# Patient Record
Sex: FEMALE | Race: WHITE | NOT HISPANIC OR LATINO | Employment: PART TIME | URBAN - METROPOLITAN AREA
[De-identification: names, ages, dates, MRNs, and addresses within clinical notes are randomized per-mention and may not be internally consistent; named-entity substitution may affect disease eponyms.]

---

## 2017-02-18 ENCOUNTER — HOSPITAL ENCOUNTER (EMERGENCY)
Facility: HOSPITAL | Age: 42
Discharge: HOME | End: 2017-02-18
Attending: EMERGENCY MEDICINE
Payer: MEDICAID

## 2017-02-18 VITALS
TEMPERATURE: 98 F | RESPIRATION RATE: 20 BRPM | HEIGHT: 60 IN | OXYGEN SATURATION: 98 % | SYSTOLIC BLOOD PRESSURE: 117 MMHG | WEIGHT: 110 LBS | HEART RATE: 102 BPM | DIASTOLIC BLOOD PRESSURE: 71 MMHG | BODY MASS INDEX: 21.6 KG/M2

## 2017-02-18 DIAGNOSIS — K40.90 UNILATERAL INGUINAL HERNIA WITHOUT OBSTRUCTION OR GANGRENE, RECURRENCE NOT SPECIFIED: ICD-10-CM

## 2017-02-18 DIAGNOSIS — R10.32 LEFT INGUINAL PAIN: ICD-10-CM

## 2017-02-18 DIAGNOSIS — N39.0 URINARY TRACT INFECTION WITHOUT HEMATURIA, SITE UNSPECIFIED: Primary | ICD-10-CM

## 2017-02-18 LAB
ALBUMIN SERPL BCP-MCNC: 3.8 G/DL
ALP SERPL-CCNC: 81 U/L
ALT SERPL W/O P-5'-P-CCNC: 18 U/L
ANION GAP SERPL CALC-SCNC: 13 MMOL/L
AST SERPL-CCNC: 12 U/L
BACTERIA #/AREA URNS HPF: ABNORMAL /HPF
BASOPHILS # BLD AUTO: 0.1 K/UL
BASOPHILS NFR BLD: 1.2 %
BILIRUB SERPL-MCNC: 0.3 MG/DL
BILIRUB UR QL STRIP: NEGATIVE
BUN SERPL-MCNC: 11 MG/DL
CALCIUM SERPL-MCNC: 9.4 MG/DL
CHLORIDE SERPL-SCNC: 101 MMOL/L
CLARITY UR: CLEAR
CO2 SERPL-SCNC: 23 MMOL/L
COLOR UR: YELLOW
CREAT SERPL-MCNC: 0.7 MG/DL
DIFFERENTIAL METHOD: ABNORMAL
EOSINOPHIL # BLD AUTO: 0.2 K/UL
EOSINOPHIL NFR BLD: 1.8 %
ERYTHROCYTE [DISTWIDTH] IN BLOOD BY AUTOMATED COUNT: 13.2 %
EST. GFR  (AFRICAN AMERICAN): >60 ML/MIN/1.73 M^2
EST. GFR  (NON AFRICAN AMERICAN): >60 ML/MIN/1.73 M^2
GLUCOSE SERPL-MCNC: 87 MG/DL
GLUCOSE UR QL STRIP: NEGATIVE
HCT VFR BLD AUTO: 43.6 %
HGB BLD-MCNC: 14.4 G/DL
HGB UR QL STRIP: NEGATIVE
KETONES UR QL STRIP: NEGATIVE
LEUKOCYTE ESTERASE UR QL STRIP: ABNORMAL
LYMPHOCYTES # BLD AUTO: 3.7 K/UL
LYMPHOCYTES NFR BLD: 29.5 %
MCH RBC QN AUTO: 32.3 PG
MCHC RBC AUTO-ENTMCNC: 33.1 %
MCV RBC AUTO: 98 FL
MICROSCOPIC COMMENT: ABNORMAL
MONOCYTES # BLD AUTO: 0.4 K/UL
MONOCYTES NFR BLD: 3 %
NEUTROPHILS # BLD AUTO: 8 K/UL
NEUTROPHILS NFR BLD: 64.5 %
NITRITE UR QL STRIP: NEGATIVE
PH UR STRIP: 6 [PH] (ref 5–8)
PLATELET # BLD AUTO: 356 K/UL
PMV BLD AUTO: 7.6 FL
POTASSIUM SERPL-SCNC: 3.3 MMOL/L
PROT SERPL-MCNC: 7.2 G/DL
PROT UR QL STRIP: NEGATIVE
RBC # BLD AUTO: 4.47 M/UL
RBC #/AREA URNS HPF: 2 /HPF (ref 0–4)
SODIUM SERPL-SCNC: 137 MMOL/L
SP GR UR STRIP: 1.01 (ref 1–1.03)
SQUAMOUS #/AREA URNS HPF: 24 /HPF
URN SPEC COLLECT METH UR: ABNORMAL
UROBILINOGEN UR STRIP-ACNC: NEGATIVE EU/DL
WBC # BLD AUTO: 12.4 K/UL
WBC #/AREA URNS HPF: 32 /HPF (ref 0–5)

## 2017-02-18 PROCEDURE — 80053 COMPREHEN METABOLIC PANEL: CPT

## 2017-02-18 PROCEDURE — 63600175 PHARM REV CODE 636 W HCPCS: Performed by: EMERGENCY MEDICINE

## 2017-02-18 PROCEDURE — 99284 EMERGENCY DEPT VISIT MOD MDM: CPT | Mod: 25

## 2017-02-18 PROCEDURE — 85025 COMPLETE CBC W/AUTO DIFF WBC: CPT

## 2017-02-18 PROCEDURE — 96374 THER/PROPH/DIAG INJ IV PUSH: CPT

## 2017-02-18 PROCEDURE — 25000003 PHARM REV CODE 250: Performed by: EMERGENCY MEDICINE

## 2017-02-18 PROCEDURE — 36415 COLL VENOUS BLD VENIPUNCTURE: CPT

## 2017-02-18 PROCEDURE — 96375 TX/PRO/DX INJ NEW DRUG ADDON: CPT

## 2017-02-18 PROCEDURE — 81000 URINALYSIS NONAUTO W/SCOPE: CPT

## 2017-02-18 RX ORDER — HALOPERIDOL 5 MG/ML
5 INJECTION INTRAMUSCULAR
Status: COMPLETED | OUTPATIENT
Start: 2017-02-18 | End: 2017-02-18

## 2017-02-18 RX ORDER — PROMETHAZINE HYDROCHLORIDE 25 MG/1
25 SUPPOSITORY RECTAL EVERY 6 HOURS PRN
Qty: 10 SUPPOSITORY | Refills: 0 | Status: SHIPPED | OUTPATIENT
Start: 2017-02-18 | End: 2017-08-10 | Stop reason: SDUPTHER

## 2017-02-18 RX ORDER — NITROFURANTOIN 25; 75 MG/1; MG/1
100 CAPSULE ORAL 2 TIMES DAILY
Qty: 10 CAPSULE | Refills: 0 | Status: SHIPPED | OUTPATIENT
Start: 2017-02-18 | End: 2017-02-23

## 2017-02-18 RX ORDER — NITROFURANTOIN 25; 75 MG/1; MG/1
100 CAPSULE ORAL
Status: COMPLETED | OUTPATIENT
Start: 2017-02-18 | End: 2017-02-18

## 2017-02-18 RX ORDER — OXYBUTYNIN CHLORIDE 5 MG/1
5 TABLET, EXTENDED RELEASE ORAL DAILY
Qty: 5 TABLET | Refills: 0 | Status: SHIPPED | OUTPATIENT
Start: 2017-02-18 | End: 2017-02-27

## 2017-02-18 RX ORDER — HYDROMORPHONE HYDROCHLORIDE 1 MG/ML
1 INJECTION, SOLUTION INTRAMUSCULAR; INTRAVENOUS; SUBCUTANEOUS
Status: COMPLETED | OUTPATIENT
Start: 2017-02-18 | End: 2017-02-18

## 2017-02-18 RX ORDER — DICLOFENAC SODIUM 50 MG/1
50 TABLET, DELAYED RELEASE ORAL 3 TIMES DAILY
Qty: 15 TABLET | Refills: 0 | Status: SHIPPED | OUTPATIENT
Start: 2017-02-18 | End: 2017-02-27

## 2017-02-18 RX ORDER — OXYBUTYNIN CHLORIDE 5 MG/1
5 TABLET, EXTENDED RELEASE ORAL
Status: COMPLETED | OUTPATIENT
Start: 2017-02-18 | End: 2017-02-18

## 2017-02-18 RX ADMIN — HALOPERIDOL LACTATE 5 MG: 5 INJECTION, SOLUTION INTRAMUSCULAR at 07:02

## 2017-02-18 RX ADMIN — NITROFURANTOIN (MONOHYDRATE/MACROCRYSTALS) 100 MG: 75; 25 CAPSULE ORAL at 09:02

## 2017-02-18 RX ADMIN — OXYBUTYNIN CHLORIDE 5 MG: 5 TABLET, FILM COATED, EXTENDED RELEASE ORAL at 09:02

## 2017-02-18 RX ADMIN — HYDROMORPHONE HYDROCHLORIDE 1 MG: 1 INJECTION, SOLUTION INTRAMUSCULAR; INTRAVENOUS; SUBCUTANEOUS at 07:02

## 2017-02-18 NOTE — ED AVS SNAPSHOT
[facility] [address]  [place] [address]               Natividad Acosta   2017  6:44 PM   ED    Description:  Female : 1975   Department:  [facility]           Your Care was Coordinated By:     Provider Role From To    Hira Molina III, MD Attending Provider [phone] --      Reason for Visit     Hip Pain           Diagnoses this Visit        Comments    Urinary tract infection without hematuria, site unspecified    -  Primary     Left inguinal pain         Unilateral inguinal hernia without obstruction or gangrene, recurrence not specified           ED Disposition     ED Disposition Condition Comment    Discharge             To Do List           Follow-up Information     Follow up with Galen Arboleda MD In 3 days.    Specialties:  Family Medicine, Internal Medicine    Contact information:    [address]  [place] [address] [phone]          Follow up with Blake Medley MD In 3 days.    Specialties:  General Surgery, Surgery    Contact information:    [address]  [facility] [id number] [phone]         These Medications        Disp Refills Start End    diclofenac (VOLTAREN) 50 MG EC tablet 15 tablet 0 2017    Take 1 tablet (50 mg total) by mouth 3 (three) times daily. - Oral    nitrofurantoin, macrocrystal-monohydrate, (MACROBID) 100 MG capsule 10 capsule 0 2017    Take 1 capsule (100 mg total) by mouth 2 (two) times daily. - Oral    promethazine (PHENERGAN) 25 MG suppository 10 suppository 0 2017     Place 1 suppository (25 mg total) rectally every 6 (six) hours as needed for Nausea. - Rectal      [facility]     [facility] -  Assistance  Registered nurses in the [facility] provide clinical advisement, health education, appointment booking, and other advisory services.  Call for this free service at [phone].              Medications           Message regarding Medications     Verify the changes and/or additions to your medication regime listed below are the same as discussed with your clinician today.  If any of these changes or additions are incorrect, please notify your healthcare provider.        START taking these NEW medications        Refills    diclofenac (VOLTAREN) 50 MG EC tablet 0    Sig: Take 1 tablet (50 mg total) by mouth 3 (three) times daily.    Class: Print    Route: Oral    nitrofurantoin, macrocrystal-monohydrate, (MACROBID) 100 MG capsule 0    Sig: Take 1 capsule (100 mg total) by mouth 2 (two) times daily.    Class: Print    Route: Oral    promethazine (PHENERGAN) 25 MG suppository 0    Sig: Place 1 suppository (25 mg total) rectally every 6 (six) hours as needed for Nausea.    Class: Print    Route: Rectal      These medications were administered today        Dose Freq    hydromorphone (PF) injection 1 mg 1 mg ED 1 Time    Sig: Inject 1 mL (1 mg total) into the vein ED 1 Time.    Class: Normal    Route: Intravenous    omnipaque 350 iohexol 350 mg iodine/mL      Notes to Pharmacy: Created by cabinet override    haloperidol lactate injection 5 mg 5 mg ED 1 Time    Sig: Inject 1 mL (5 mg total) into the vein ED 1 Time.    Class: Normal    Route: Intravenous    nitrofurantoin (macrocrystal-monohydrate) 100 MG capsule 100 mg 100 mg ED 1 Time    Sig: Take 1 capsule (100 mg total) by mouth ED 1 Time.    Class: Normal    Route: Oral      STOP taking these medications     duloxetine (CYMBALTA) 60 MG capsule Take 60 mg by mouth once daily.           Verify that the below list of medications is an accurate representation of the medications you are currently taking.  If none reported, the list may be blank. If incorrect, please contact your healthcare provider. Carry this list with you in case of emergency.           Current Medications     diclofenac (VOLTAREN) 50 MG EC tablet Take 1 tablet (50 mg total) by mouth 3  (three) times daily.    nitrofurantoin (macrocrystal-monohydrate) 100 MG capsule 100 mg Take 1 capsule (100 mg total) by mouth ED 1 Time.    nitrofurantoin, macrocrystal-monohydrate, (MACROBID) 100 MG capsule Take 1 capsule (100 mg total) by mouth 2 (two) times daily.    omnipaque 350 iohexol 350 mg iodine/mL     promethazine (PHENERGAN) 25 MG suppository Place 1 suppository (25 mg total) rectally every 6 (six) hours as needed for Nausea.           Clinical Reference Information           Your Vitals Were     BP Pulse Temp Resp Height Weight    166/85 (BP Location: Right arm, Patient Position: Sitting) 114 98.9 °F (37.2 °C) (Oral) 22 5' (1.524 m) 49.9 kg (110 lb)    SpO2 BMI             100% 21.48 kg/m2         Allergies as of 2/18/2017        Reactions    Stadol [Butorphanol Tartrate] Anaphylaxis    Demerol [Meperidine]     vomiting    Levaquin [Levofloxacin] Hives    Morphine     vomiting    Reglan [Metoclopramide] Hives    Zofran [Ondansetron Hcl (Pf)]     Iodine Rash      Immunizations Administered on Date of Encounter - 2/18/2017     None      ED Micro, Lab, POCT     Start Ordered       Status Ordering Provider    02/18/17 1951 02/18/17 1950  Urinalysis  STAT      Final result     02/18/17 1950 02/18/17 1950  Urinalysis Microscopic  Once      Final result     02/18/17 1911 02/18/17 1911  CBC auto differential  STAT      Final result     02/18/17 1911 02/18/17 1911  Comprehensive metabolic panel  STAT      Final result       ED Imaging Orders     Start Ordered       Status Ordering Provider    02/18/17 2008 02/18/17 1911  CT Abdomen Pelvis  Without Contrast  1 time imaging     Comments:  Oral contrast    In process     02/18/17 1911 02/18/17 1911    1 time imaging,   Status:  Canceled     Comments:  Oral contrast    Canceled       Discharge References/Attachments     BLADDER INFECTION, FEMALE (ADULT) (ENGLISH)    HERNIA (ADULT) (ENGLISH)      Smoking Cessation     If you would like to quit smoking:   You may be  eligible for free services if you are a [place] resident and started smoking cigarettes before September 1, 1988.  Call the Smoking Cessation [employer] (SCT) toll free at (538) 877-2423 or (496) 178-1460.   Call 6-800-QUIT-NOW if you do not meet the above criteria.             [facility] complies with applicable Federal civil rights laws and does not discriminate on the basis of race, color, national origin, age, disability, or sex.        Language Assistance Services     ATTENTION: Language assistance services are available, free of charge. Please call [phone].      ATENCIÓN: Si habla español, tiene a betancourt disposición servicios gratuitos de asistencia lingüística. Llame al [phone].     CHÚ Ý: N?u b?n nói Ti?ng Vi?t, có các d?ch v? h? tr? ngôn ng? mi?n phí dành cho b?n. G?i s? [phone].

## 2017-02-19 NOTE — ED PROVIDER NOTES
"Encounter Date: 2/18/2017    SCRIBE #1 NOTE: I, Roberto Churchill, am scribing for, and in the presence of, Dr. Molina.       History     Chief Complaint   Patient presents with    Hip Pain     Pt reports "knot" to the left hip that is tender to touch, reports it first started 3 days ago and has an appointment with PCP next week.     Review of patient's allergies indicates:   Allergen Reactions    Stadol [butorphanol tartrate] Anaphylaxis    Demerol [meperidine]      vomiting    Levaquin [levofloxacin] Hives    Morphine      vomiting      Reglan [metoclopramide] Hives    Zofran [ondansetron hcl (pf)]     Iodine Rash     HPI Comments: 02/18/2017  7:05 PM     Chief Complaint: Left Hip Pain      The patient is a 41 y.o. female with a PMHx of kidney stone and renal disorder who is presenting with an acute onset of gradually worsening left hip pain with a "knot" to left hip that started 3 days ago. Pt reported that her pain "feels like it is in the bone." Her pain is exacerbated with standing. She stated that lying down provides some relief. Pt reported helping to lift a boat prior to onset of symptoms. Associated symptom of fever. Pt called her PCP yesterday and scheduled an appointment in 3 days. Pt has a past surgical history that includes Hysterectomy; Appendectomy; Cholecystectomy; Tonsillectomy; kidney stent; and Ureter surgery.      The history is provided by the patient.     Past Medical History   Diagnosis Date    Kidney stone     Renal disorder kidney stones     No past medical history pertinent negatives.  Past Surgical History   Procedure Laterality Date    Hysterectomy      Appendectomy      Cholecystectomy      Tonsillectomy      Kidney stent      Ureter surgery       History reviewed. No pertinent family history.  Social History   Substance Use Topics    Smoking status: Current Every Day Smoker     Packs/day: 1.00    Smokeless tobacco: None    Alcohol use No     Review of Systems " "  Constitutional: Positive for fever.   HENT: Negative for sore throat.    Eyes: Negative for visual disturbance.   Respiratory: Negative for shortness of breath.    Cardiovascular: Negative for chest pain.   Gastrointestinal: Negative for nausea.   Genitourinary: Negative for dysuria.   Musculoskeletal: Positive for arthralgias (Left hip pain with a "knot" to left hip.).   Skin: Negative for rash.   Neurological: Negative for weakness.   Hematological: Does not bruise/bleed easily.   Psychiatric/Behavioral: Negative for confusion.       Physical Exam   Initial Vitals   BP Pulse Resp Temp SpO2   02/18/17 1844 02/18/17 1844 02/18/17 1844 02/18/17 1844 02/18/17 1844   166/85 114 22 98.9 °F (37.2 °C) 100 %     Physical Exam    Nursing note and vitals reviewed.  Constitutional: She appears well-developed and well-nourished.   HENT:   Head: Normocephalic and atraumatic.   Eyes: Conjunctivae and EOM are normal. Pupils are equal, round, and reactive to light.   Neck: Neck supple.   Cardiovascular: Normal rate, regular rhythm, normal heart sounds and intact distal pulses. Exam reveals no gallop and no friction rub.    No murmur heard.  Pulmonary/Chest: Breath sounds normal. She has no wheezes. She has no rhonchi. She has no rales.   Abdominal: A hernia is present. Hernia confirmed positive in the left inguinal area (Reducible left inguinal hernia.).   Abdomen otherwise soft and non-tender.   Musculoskeletal:   Left hip non-tender.   Neurological: She is alert and oriented to person, place, and time.   Skin: No rash noted. No erythema.   Psychiatric: She has a normal mood and affect.         ED Course   Procedures  Labs Reviewed - No data to display          Medical Decision Making:   ED Management:  Natividad Acosta is a 41 y.o. female who presents with  left inguinal pain.  Physical exam reveals left inguinal swelling which increases with Valsalva consistent with inguinal hernia albeit unlikely in a female patient.  CT of " the abdomen and pelvis is obtained due to the excessive pain out of proportion to physical findings and her history of kidney stones.  She does have mild hydronephrosis with no evidence of stones.  She is found have significant pyuria and bacteriuria although specimen is contaminated raising concerns about the validity of the study.  She has a history of frequent opiate usage complicating the diagnostic picture.  She is given dig pan for bladder spasm, placed on nitrofurantoin for urinary tract infection and given Voltaren for pain.  She is referred to general surgery for evaluation of possible inguinal hernia.            Scribe Attestation:   Scribe #1: I performed the above scribed service and the documentation accurately describes the services I performed. I attest to the accuracy of the note.    Attending Attestation:           Physician Attestation for Scribe:  Physician Attestation Statement for Scribe #1: I, Dr. Molina, reviewed documentation, as scribed by Roberto Churchill in my presence, and it is both accurate and complete.                 ED Course     Clinical Impression:   The primary encounter diagnosis was Urinary tract infection without hematuria, site unspecified. Diagnoses of Left inguinal pain and Unilateral inguinal hernia without obstruction or gangrene, recurrence not specified were also pertinent to this visit.          Hira Molina III, MD  02/18/17 [id number]

## 2017-02-21 ENCOUNTER — TELEPHONE (OUTPATIENT)
Dept: SURGERY | Facility: CLINIC | Age: 42
End: 2017-02-21

## 2017-02-21 NOTE — TELEPHONE ENCOUNTER
----- Message from Suzanne Looney sent at 2/21/2017  3:32 PM CST -----  Contact: homar Coles surgeon,   [facility] follow up   Call back    Medicaid pt

## 2017-02-21 NOTE — TELEPHONE ENCOUNTER
Patient was seen in the ER and diagnosed with an inguinal hernia.  Appointment booked with Dr Medley on Monday, Feb 27 at 320 PM.

## 2017-02-27 ENCOUNTER — OFFICE VISIT (OUTPATIENT)
Dept: SURGERY | Facility: CLINIC | Age: 42
End: 2017-02-27
Payer: MEDICAID

## 2017-02-27 VITALS
WEIGHT: 116.19 LBS | DIASTOLIC BLOOD PRESSURE: 74 MMHG | SYSTOLIC BLOOD PRESSURE: 136 MMHG | BODY MASS INDEX: 22.81 KG/M2 | HEART RATE: 100 BPM | HEIGHT: 60 IN

## 2017-02-27 DIAGNOSIS — K43.2 INCISIONAL HERNIA, WITHOUT OBSTRUCTION OR GANGRENE: Primary | ICD-10-CM

## 2017-02-27 PROCEDURE — 99204 OFFICE O/P NEW MOD 45 MIN: CPT | Mod: S$PBB,,, | Performed by: SURGERY

## 2017-02-27 PROCEDURE — 99213 OFFICE O/P EST LOW 20 MIN: CPT | Mod: PBBFAC,PO | Performed by: SURGERY

## 2017-02-27 PROCEDURE — 99999 PR PBB SHADOW E&M-EST. PATIENT-LVL III: CPT | Mod: PBBFAC,,, | Performed by: SURGERY

## 2017-02-27 RX ORDER — SODIUM CHLORIDE 9 MG/ML
INJECTION, SOLUTION INTRAVENOUS CONTINUOUS
Status: CANCELLED | OUTPATIENT
Start: 2017-03-07

## 2017-02-27 RX ORDER — OXYCODONE AND ACETAMINOPHEN 10; 325 MG/1; MG/1
TABLET ORAL
Refills: 0 | COMMUNITY
Start: 2016-12-29 | End: 2017-03-06

## 2017-02-27 RX ORDER — TAMSULOSIN HYDROCHLORIDE 0.4 MG/1
CAPSULE ORAL
Refills: 0 | Status: ON HOLD | COMMUNITY
Start: 2016-12-29 | End: 2017-03-07

## 2017-02-27 RX ORDER — HYDROCODONE BITARTRATE AND ACETAMINOPHEN 10; 325 MG/1; MG/1
1 TABLET ORAL EVERY 4 HOURS PRN
Qty: 20 TABLET | Refills: 0 | Status: SHIPPED | OUTPATIENT
Start: 2017-02-27 | End: 2017-03-09

## 2017-02-27 RX ORDER — MULTIVITAMIN
1 TABLET ORAL DAILY
COMMUNITY
End: 2018-08-29 | Stop reason: CLARIF

## 2017-02-27 RX ORDER — CIPROFLOXACIN 500 MG/1
TABLET ORAL
Refills: 0 | COMMUNITY
Start: 2016-12-29 | End: 2017-02-27 | Stop reason: ALTCHOICE

## 2017-02-27 NOTE — LETTER
February 27, 2017      Hira Molina III, MD  [address] [place] [address] [address] [place] [address]           [facility] General Surgery  [address]  [place] [address]  Phone: [phone]          Patient: Natividad Acosta   MR Number: [id number]   YOB: 1975   Date of Visit: 2/27/2017       Dear Dr. Hira Molina III:    Thank you for referring Natividad Acosta to me for evaluation. Attached you will find relevant portions of my assessment and plan of care.    If you have questions, please do not hesitate to call me. I look forward to following Natividad Acosta along with you.    Sincerely,    Blake Medley MD    Enclosure  CC:  No Recipients    If you would like to receive this communication electronically, please contact [email or web address] or [phone] to request more information on [employer] access.    For providers and/or their staff who would like to refer a patient to [facility], please contact us through our one-stop-shop provider referral line, [facility], at [phone].    If you feel you have received this communication in error or would no longer like to receive these types of communications, please e-mail [email or web address]

## 2017-02-27 NOTE — PROGRESS NOTES
"Subjective:       Patient ID: Natividad Acosta is a 41 y.o. female.    Chief Complaint: Consult (hernia left groin, pain in groin, back and down leg)    HPI Comments: Referred by ED for possible left inguinal hernia.     The patient is a 41 y.o. female with a PMHx of kidney stone and renal disorder who is presenting with an acute onset of gradually worsening left hip pain with a "knot" to left groin that started 12 days ago. Pt reported that her pain "feels like it is in the bone." Her pain is exacerbated with standing. She stated that lying down provides some relief. Pt reported helping to lift a boat prior to onset of symptoms. Associated symptom of fever. Pt called her PCP yesterday and scheduled an appointment in 3 days. Pt has a past surgical history that includes Hysterectomy; Appendectomy; Cholecystectomy; Tonsillectomy; kidney stent; and Ureter surgery.    She was seen in the ED where CT scan was performed which did not reveal a hernia, but when she stands up she feels a bulge.        Review of Systems   Constitutional: Negative for appetite change, chills, diaphoresis, fatigue, fever and unexpected weight change.   HENT: Negative for hearing loss, sore throat, trouble swallowing and voice change.    Eyes: Negative for visual disturbance.   Respiratory: Negative for cough, shortness of breath and wheezing.    Cardiovascular: Negative for chest pain, palpitations and leg swelling.   Gastrointestinal: Positive for abdominal pain. Negative for abdominal distention, anal bleeding, blood in stool, constipation, diarrhea, nausea, rectal pain and vomiting.   Genitourinary: Negative for difficulty urinating, dysuria, flank pain, frequency, hematuria, menstrual problem and urgency.   Musculoskeletal: Negative for arthralgias, back pain, joint swelling, myalgias and neck pain.   Skin: Negative for pallor and rash.   Neurological: Negative for dizziness, syncope, weakness and headaches.   Hematological: Negative for " adenopathy. Does not bruise/bleed easily.   Psychiatric/Behavioral: Negative for suicidal ideas. The patient is not nervous/anxious.        Objective:      Physical Exam   Constitutional: She is oriented to person, place, and time. She appears well-developed and well-nourished. No distress.   HENT:   Head: Normocephalic and atraumatic.   Right Ear: Hearing and external ear normal. No decreased hearing is noted.   Left Ear: Hearing and external ear normal. No decreased hearing is noted.   Nose: Nose normal.   Mouth/Throat: Mucous membranes are not dry. Normal dentition.   Eyes: Conjunctivae are normal. Pupils are equal, round, and reactive to light. Right eye exhibits no discharge. Left eye exhibits no discharge. Right conjunctiva is not injected. Right conjunctiva has no hemorrhage. Left conjunctiva is not injected. Left conjunctiva has no hemorrhage. Right eye exhibits normal extraocular motion and no nystagmus. Left eye exhibits normal extraocular motion and no nystagmus. Right pupil is round and reactive. Left pupil is round and reactive. Pupils are equal.   Neck: No JVD present. No tracheal deviation and normal range of motion present. No thyroid mass and no thyromegaly present.   Cardiovascular: Normal rate, regular rhythm, normal heart sounds and intact distal pulses.  Exam reveals no gallop and no friction rub.    No murmur heard.  Pulmonary/Chest: Effort normal and breath sounds normal. No respiratory distress. She has no wheezes. She has no rales.   Abdominal: Soft. Bowel sounds are normal. She exhibits no distension and no mass. There is no hepatosplenomegaly. There is no tenderness. There is no rebound, no guarding and no CVA tenderness. A hernia is present. Hernia confirmed positive in the ventral area and confirmed positive in the left inguinal area (possible).       Musculoskeletal: Normal range of motion. She exhibits no edema or tenderness.   Lymphadenopathy:     She has no cervical adenopathy.      She has no axillary adenopathy.   Neurological: She is alert and oriented to person, place, and time. She has normal strength. No cranial nerve deficit or sensory deficit.   Skin: Skin is warm and dry. No rash noted. She is not diaphoretic. No cyanosis. No pallor. Nails show no clubbing.   Psychiatric: She has a normal mood and affect. Her behavior is normal. Judgment and thought content normal. Her mood appears not anxious. She does not exhibit a depressed mood.       Assessment:       1. Incisional hernia, without obstruction or gangrene    2. Incisional hernia        Plan:       For surgical repair on 3/7/17.  All aspects of procedure including risks and possible complications were discussed in detail with the patient who agrees to proceed with procedure.  All questions were answered and consent was obtained. Preop orders were written.

## 2017-03-06 ENCOUNTER — ANESTHESIA EVENT (OUTPATIENT)
Dept: SURGERY | Facility: HOSPITAL | Age: 42
End: 2017-03-06
Payer: MEDICAID

## 2017-03-06 RX ORDER — PHENYLEPHRINE HCL 10 MG/1
10 TABLET, FILM COATED ORAL EVERY 4 HOURS PRN
COMMUNITY
End: 2017-08-14

## 2017-03-06 NOTE — PROGRESS NOTES
Asuncion at Dr Medley's office notified of pt's microscopic urine result.  States she will notify Dr Medley

## 2017-03-07 ENCOUNTER — NURSE TRIAGE (OUTPATIENT)
Dept: ADMINISTRATIVE | Facility: CLINIC | Age: 42
End: 2017-03-07

## 2017-03-07 ENCOUNTER — ANESTHESIA (OUTPATIENT)
Dept: SURGERY | Facility: HOSPITAL | Age: 42
End: 2017-03-07
Payer: MEDICAID

## 2017-03-07 ENCOUNTER — HOSPITAL ENCOUNTER (OUTPATIENT)
Facility: HOSPITAL | Age: 42
Discharge: HOME | End: 2017-03-07
Attending: SURGERY | Admitting: SURGERY
Payer: MEDICAID

## 2017-03-07 VITALS
BODY MASS INDEX: 21.6 KG/M2 | OXYGEN SATURATION: 96 % | WEIGHT: 110 LBS | HEIGHT: 60 IN | DIASTOLIC BLOOD PRESSURE: 70 MMHG | TEMPERATURE: 98 F | HEART RATE: 82 BPM | SYSTOLIC BLOOD PRESSURE: 120 MMHG | RESPIRATION RATE: 18 BRPM

## 2017-03-07 DIAGNOSIS — K43.2 INCISIONAL HERNIA: ICD-10-CM

## 2017-03-07 DIAGNOSIS — K40.90 INGUINAL HERNIA OF LEFT SIDE WITHOUT OBSTRUCTION OR GANGRENE: Primary | ICD-10-CM

## 2017-03-07 DIAGNOSIS — K43.2 INCISIONAL HERNIA, WITHOUT OBSTRUCTION OR GANGRENE: ICD-10-CM

## 2017-03-07 PROCEDURE — 25000003 PHARM REV CODE 250: Performed by: ANESTHESIOLOGY

## 2017-03-07 PROCEDURE — 71000015 HC POSTOP RECOV 1ST HR: Performed by: SURGERY

## 2017-03-07 PROCEDURE — D9220A PRA ANESTHESIA: Mod: CRNA,,, | Performed by: NURSE ANESTHETIST, CERTIFIED REGISTERED

## 2017-03-07 PROCEDURE — 25000003 PHARM REV CODE 250: Performed by: NURSE ANESTHETIST, CERTIFIED REGISTERED

## 2017-03-07 PROCEDURE — 99900103 DSU ONLY-NO CHARGE-INITIAL HR (STAT): Performed by: SURGERY

## 2017-03-07 PROCEDURE — 25000003 PHARM REV CODE 250: Performed by: SURGERY

## 2017-03-07 PROCEDURE — 71000039 HC RECOVERY, EACH ADD'L HOUR: Performed by: SURGERY

## 2017-03-07 PROCEDURE — 99900104 DSU ONLY-NO CHARGE-EA ADD'L HR (STAT): Performed by: SURGERY

## 2017-03-07 PROCEDURE — 36000706: Performed by: SURGERY

## 2017-03-07 PROCEDURE — 63600175 PHARM REV CODE 636 W HCPCS: Performed by: ANESTHESIOLOGY

## 2017-03-07 PROCEDURE — 63600175 PHARM REV CODE 636 W HCPCS: Performed by: NURSE ANESTHETIST, CERTIFIED REGISTERED

## 2017-03-07 PROCEDURE — 88342 IMHCHEM/IMCYTCHM 1ST ANTB: CPT | Mod: 26,,, | Performed by: PATHOLOGY

## 2017-03-07 PROCEDURE — 63600175 PHARM REV CODE 636 W HCPCS: Performed by: SURGERY

## 2017-03-07 PROCEDURE — D9220A PRA ANESTHESIA: Mod: ANES,,, | Performed by: ANESTHESIOLOGY

## 2017-03-07 PROCEDURE — 88305 TISSUE EXAM BY PATHOLOGIST: CPT | Performed by: PATHOLOGY

## 2017-03-07 PROCEDURE — 71000033 HC RECOVERY, INTIAL HOUR: Performed by: SURGERY

## 2017-03-07 PROCEDURE — 49505 PRP I/HERN INIT REDUC >5 YR: CPT | Mod: LT,,, | Performed by: SURGERY

## 2017-03-07 PROCEDURE — 37000009 HC ANESTHESIA EA ADD 15 MINS: Performed by: SURGERY

## 2017-03-07 PROCEDURE — 37000008 HC ANESTHESIA 1ST 15 MINUTES: Performed by: SURGERY

## 2017-03-07 PROCEDURE — 36000707: Performed by: SURGERY

## 2017-03-07 RX ORDER — SODIUM CHLORIDE, SODIUM LACTATE, POTASSIUM CHLORIDE, CALCIUM CHLORIDE 600; 310; 30; 20 MG/100ML; MG/100ML; MG/100ML; MG/100ML
INJECTION, SOLUTION INTRAVENOUS CONTINUOUS
Status: DISCONTINUED | OUTPATIENT
Start: 2017-03-07 | End: 2017-03-07 | Stop reason: HOSPADM

## 2017-03-07 RX ORDER — LIDOCAINE HYDROCHLORIDE 10 MG/ML
1 INJECTION, SOLUTION EPIDURAL; INFILTRATION; INTRACAUDAL; PERINEURAL ONCE
Status: DISCONTINUED | OUTPATIENT
Start: 2017-03-07 | End: 2017-03-07 | Stop reason: HOSPADM

## 2017-03-07 RX ORDER — SODIUM CHLORIDE 9 MG/ML
INJECTION, SOLUTION INTRAVENOUS CONTINUOUS
Status: DISCONTINUED | OUTPATIENT
Start: 2017-03-07 | End: 2017-03-07

## 2017-03-07 RX ORDER — MIDAZOLAM HYDROCHLORIDE 1 MG/ML
INJECTION, SOLUTION INTRAMUSCULAR; INTRAVENOUS
Status: DISCONTINUED | OUTPATIENT
Start: 2017-03-07 | End: 2017-03-07

## 2017-03-07 RX ORDER — CEFAZOLIN SODIUM 2 G/50ML
2 SOLUTION INTRAVENOUS
Status: COMPLETED | OUTPATIENT
Start: 2017-03-07 | End: 2017-03-07

## 2017-03-07 RX ORDER — FENTANYL CITRATE 50 UG/ML
INJECTION, SOLUTION INTRAMUSCULAR; INTRAVENOUS
Status: DISCONTINUED | OUTPATIENT
Start: 2017-03-07 | End: 2017-03-07

## 2017-03-07 RX ORDER — HYDROCODONE BITARTRATE AND ACETAMINOPHEN 10; 325 MG/1; MG/1
1 TABLET ORAL EVERY 4 HOURS PRN
Qty: 30 TABLET | Refills: 0 | Status: SHIPPED | OUTPATIENT
Start: 2017-03-07 | End: 2017-03-09 | Stop reason: SDUPTHER

## 2017-03-07 RX ORDER — LIDOCAINE HCL/PF 100 MG/5ML
SYRINGE (ML) INTRAVENOUS
Status: DISCONTINUED | OUTPATIENT
Start: 2017-03-07 | End: 2017-03-07

## 2017-03-07 RX ORDER — ONDANSETRON 2 MG/ML
4 INJECTION INTRAMUSCULAR; INTRAVENOUS ONCE
Status: DISCONTINUED | OUTPATIENT
Start: 2017-03-07 | End: 2017-03-07

## 2017-03-07 RX ORDER — GLYCOPYRROLATE 0.2 MG/ML
INJECTION INTRAMUSCULAR; INTRAVENOUS
Status: DISCONTINUED | OUTPATIENT
Start: 2017-03-07 | End: 2017-03-07

## 2017-03-07 RX ORDER — KETOROLAC TROMETHAMINE 30 MG/ML
15 INJECTION, SOLUTION INTRAMUSCULAR; INTRAVENOUS ONCE
Status: COMPLETED | OUTPATIENT
Start: 2017-03-07 | End: 2017-03-07

## 2017-03-07 RX ORDER — OXYCODONE HYDROCHLORIDE 5 MG/1
5 TABLET ORAL ONCE AS NEEDED
Status: COMPLETED | OUTPATIENT
Start: 2017-03-07 | End: 2017-03-07

## 2017-03-07 RX ORDER — ROCURONIUM BROMIDE 10 MG/ML
INJECTION, SOLUTION INTRAVENOUS
Status: DISCONTINUED | OUTPATIENT
Start: 2017-03-07 | End: 2017-03-07

## 2017-03-07 RX ORDER — PROMETHAZINE HYDROCHLORIDE 25 MG/ML
INJECTION, SOLUTION INTRAMUSCULAR; INTRAVENOUS
Status: DISCONTINUED | OUTPATIENT
Start: 2017-03-07 | End: 2017-03-07

## 2017-03-07 RX ORDER — DEXAMETHASONE SODIUM PHOSPHATE 4 MG/ML
INJECTION, SOLUTION INTRA-ARTICULAR; INTRALESIONAL; INTRAMUSCULAR; INTRAVENOUS; SOFT TISSUE
Status: DISCONTINUED | OUTPATIENT
Start: 2017-03-07 | End: 2017-03-07

## 2017-03-07 RX ORDER — SODIUM CHLORIDE, SODIUM LACTATE, POTASSIUM CHLORIDE, CALCIUM CHLORIDE 600; 310; 30; 20 MG/100ML; MG/100ML; MG/100ML; MG/100ML
INJECTION, SOLUTION INTRAVENOUS CONTINUOUS
Status: DISCONTINUED | OUTPATIENT
Start: 2017-03-07 | End: 2017-03-07

## 2017-03-07 RX ORDER — NEOSTIGMINE METHYLSULFATE 1 MG/ML
INJECTION, SOLUTION INTRAVENOUS
Status: DISCONTINUED | OUTPATIENT
Start: 2017-03-07 | End: 2017-03-07

## 2017-03-07 RX ORDER — OXYCODONE HYDROCHLORIDE 5 MG/1
5 TABLET ORAL EVERY 6 HOURS PRN
Status: DISCONTINUED | OUTPATIENT
Start: 2017-03-07 | End: 2017-03-07 | Stop reason: HOSPADM

## 2017-03-07 RX ORDER — MIDAZOLAM HYDROCHLORIDE 1 MG/ML
1 INJECTION, SOLUTION INTRAMUSCULAR; INTRAVENOUS EVERY 10 MIN PRN
Status: COMPLETED | OUTPATIENT
Start: 2017-03-07 | End: 2017-03-07

## 2017-03-07 RX ORDER — LORAZEPAM 2 MG/ML
0.5 INJECTION INTRAMUSCULAR ONCE
Status: COMPLETED | OUTPATIENT
Start: 2017-03-07 | End: 2017-03-07

## 2017-03-07 RX ORDER — PROPOFOL 10 MG/ML
VIAL (ML) INTRAVENOUS
Status: DISCONTINUED | OUTPATIENT
Start: 2017-03-07 | End: 2017-03-07

## 2017-03-07 RX ORDER — FENTANYL CITRATE 50 UG/ML
25 INJECTION, SOLUTION INTRAMUSCULAR; INTRAVENOUS EVERY 5 MIN PRN
Status: COMPLETED | OUTPATIENT
Start: 2017-03-07 | End: 2017-03-07

## 2017-03-07 RX ORDER — BUPIVACAINE HYDROCHLORIDE AND EPINEPHRINE 2.5; 5 MG/ML; UG/ML
INJECTION, SOLUTION EPIDURAL; INFILTRATION; INTRACAUDAL; PERINEURAL
Status: DISCONTINUED | OUTPATIENT
Start: 2017-03-07 | End: 2017-03-07 | Stop reason: HOSPADM

## 2017-03-07 RX ORDER — ACETAMINOPHEN 10 MG/ML
1000 INJECTION, SOLUTION INTRAVENOUS EVERY 8 HOURS
Status: DISCONTINUED | OUTPATIENT
Start: 2017-03-07 | End: 2017-03-07 | Stop reason: HOSPADM

## 2017-03-07 RX ORDER — SODIUM CHLORIDE 9 MG/ML
INJECTION, SOLUTION INTRAVENOUS CONTINUOUS
Status: CANCELLED | OUTPATIENT
Start: 2017-03-07

## 2017-03-07 RX ORDER — LORAZEPAM 2 MG/ML
0.5 INJECTION INTRAMUSCULAR EVERY 4 HOURS PRN
Status: DISCONTINUED | OUTPATIENT
Start: 2017-03-07 | End: 2017-03-07

## 2017-03-07 RX ORDER — HYDROMORPHONE HYDROCHLORIDE 2 MG/ML
1 INJECTION, SOLUTION INTRAMUSCULAR; INTRAVENOUS; SUBCUTANEOUS EVERY 4 HOURS PRN
Status: CANCELLED | OUTPATIENT
Start: 2017-03-07

## 2017-03-07 RX ORDER — HYDROMORPHONE HYDROCHLORIDE 2 MG/ML
0.2 INJECTION, SOLUTION INTRAMUSCULAR; INTRAVENOUS; SUBCUTANEOUS EVERY 5 MIN PRN
Status: DISCONTINUED | OUTPATIENT
Start: 2017-03-07 | End: 2017-03-07 | Stop reason: HOSPADM

## 2017-03-07 RX ADMIN — OXYCODONE HYDROCHLORIDE 5 MG: 5 TABLET ORAL at 12:03

## 2017-03-07 RX ADMIN — PROMETHAZINE HYDROCHLORIDE 6.25 MG: 25 INJECTION INTRAMUSCULAR; INTRAVENOUS at 12:03

## 2017-03-07 RX ADMIN — ROCURONIUM BROMIDE 30 MG: 10 INJECTION, SOLUTION INTRAVENOUS at 12:03

## 2017-03-07 RX ADMIN — KETOROLAC TROMETHAMINE 15 MG: 30 INJECTION, SOLUTION INTRAMUSCULAR at 01:03

## 2017-03-07 RX ADMIN — FENTANYL CITRATE 25 MCG: 50 INJECTION INTRAMUSCULAR; INTRAVENOUS at 01:03

## 2017-03-07 RX ADMIN — MIDAZOLAM HYDROCHLORIDE 1 MG: 1 INJECTION, SOLUTION INTRAMUSCULAR; INTRAVENOUS at 11:03

## 2017-03-07 RX ADMIN — PROPOFOL 150 MG: 10 INJECTION, EMULSION INTRAVENOUS at 12:03

## 2017-03-07 RX ADMIN — GLYCOPYRROLATE 0.4 MCG: 0.2 INJECTION, SOLUTION INTRAMUSCULAR; INTRAVENOUS at 12:03

## 2017-03-07 RX ADMIN — HYDROMORPHONE HYDROCHLORIDE 0.2 MG: 2 INJECTION INTRAMUSCULAR; INTRAVENOUS; SUBCUTANEOUS at 02:03

## 2017-03-07 RX ADMIN — DEXAMETHASONE SODIUM PHOSPHATE 4 MG: 4 INJECTION, SOLUTION INTRAMUSCULAR; INTRAVENOUS at 12:03

## 2017-03-07 RX ADMIN — ACETAMINOPHEN 1000 MG: 10 INJECTION, SOLUTION INTRAVENOUS at 01:03

## 2017-03-07 RX ADMIN — MIDAZOLAM 2 MG: 1 INJECTION INTRAMUSCULAR; INTRAVENOUS at 11:03

## 2017-03-07 RX ADMIN — OXYCODONE HYDROCHLORIDE 5 MG: 5 TABLET ORAL at 01:03

## 2017-03-07 RX ADMIN — SODIUM CHLORIDE, SODIUM LACTATE, POTASSIUM CHLORIDE, AND CALCIUM CHLORIDE: .6; .31; .03; .02 INJECTION, SOLUTION INTRAVENOUS at 12:03

## 2017-03-07 RX ADMIN — CEFAZOLIN SODIUM 2 G: 2 SOLUTION INTRAVENOUS at 12:03

## 2017-03-07 RX ADMIN — LORAZEPAM 0.5 MG: 2 INJECTION, SOLUTION INTRAMUSCULAR; INTRAVENOUS at 01:03

## 2017-03-07 RX ADMIN — SODIUM CHLORIDE, SODIUM LACTATE, POTASSIUM CHLORIDE, AND CALCIUM CHLORIDE: .6; .31; .03; .02 INJECTION, SOLUTION INTRAVENOUS at 10:03

## 2017-03-07 RX ADMIN — MIDAZOLAM HYDROCHLORIDE 1 MG: 1 INJECTION, SOLUTION INTRAMUSCULAR; INTRAVENOUS at 10:03

## 2017-03-07 RX ADMIN — FENTANYL CITRATE 100 MCG: 50 INJECTION INTRAMUSCULAR; INTRAVENOUS at 12:03

## 2017-03-07 RX ADMIN — FENTANYL CITRATE 25 MCG: 50 INJECTION INTRAMUSCULAR; INTRAVENOUS at 12:03

## 2017-03-07 RX ADMIN — LIDOCAINE HYDROCHLORIDE 75 MG: 20 INJECTION, SOLUTION INTRAVENOUS at 12:03

## 2017-03-07 RX ADMIN — NEOSTIGMINE METHYLSULFATE 2 MG: 1 INJECTION INTRAVENOUS at 12:03

## 2017-03-07 NOTE — DISCHARGE SUMMARY
Discharge Summary  General Surgery      Admit Date: 3/7/2017    Discharge Date :3/7/2017    Attending Physician: Blake Medley     Discharge Physician: Blake Medley    Discharged Condition: good    Discharge Diagnosis: Inguinal hernia, without obstruction or gangrene [K43.2]    Treatments/Procedures: Procedure(s) (LRB):  REPAIR-HERNIA-inguinal (Left)    Hospital Course: Uneventful; Discharged home from [facility]    Significant Diagnostic Studies: none    Disposition: Home or Self Care    Diet: Regular    Follow up: Office 10-14 days    Activity: No heavy lifting till seen in office.    Patient Instructions:   Current Discharge Medication List      START taking these medications    Details   !! hydrocodone-acetaminophen 10-325mg (NORCO)  mg Tab Take 1 tablet by mouth every 4 (four) hours as needed for Pain.  Qty: 30 tablet, Refills: 0       !! - Potential duplicate medications found. Please discuss with provider.      CONTINUE these medications which have NOT CHANGED    Details   !! hydrocodone-acetaminophen 10-325mg (NORCO)  mg Tab Take 1 tablet by mouth every 4 (four) hours as needed.  Qty: 20 tablet, Refills: 0      multivitamin (ONE DAILY MULTIVITAMIN) per tablet Take 1 tablet by mouth once daily.      phenylephrine (SUDAFED PE) 10 MG Tab Take 10 mg by mouth every 4 (four) hours as needed.      promethazine (PHENERGAN) 25 MG suppository Place 1 suppository (25 mg total) rectally every 6 (six) hours as needed for Nausea.  Qty: 10 suppository, Refills: 0       !! - Potential duplicate medications found. Please discuss with provider.      STOP taking these medications       tamsulosin (FLOMAX) 0.4 mg Cp24 Comments:   Reason for Stopping:                 Discharge Procedure Orders  Diet general     Activity as tolerated     Shower on day dressing removed (No bath)     Lifting restrictions     Call MD for:  temperature >100.4     Call MD for:  persistent nausea and vomiting     Call MD for:  severe  uncontrolled pain     Call MD for:  difficulty breathing, headache or visual disturbances     Call MD for:  redness, tenderness, or signs of infection (pain, swelling, redness, odor or green/yellow discharge around incision site)     Call MD for:  hives     Call MD for:  persistent dizziness or light-headedness     Call MD for:  extreme fatigue

## 2017-03-07 NOTE — ANESTHESIA PREPROCEDURE EVALUATION
03/07/2017  Natividad Acosta is a 41 y.o., female.    [facility] Anesthesia Evaluation    I have reviewed the Patient Summary Reports.    I have reviewed the Nursing Notes.      Review of Systems  Anesthesia Hx:  No problems with previous Anesthesia        Physical Exam  General:  Well nourished    Airway/Jaw/Neck:  Airway Findings: Mouth Opening: Normal Mallampati: I                 Anesthesia Plan  Type of Anesthesia, risks & benefits discussed:  Anesthesia Type:  general  Patient's Preference:   Intra-op Monitoring Plan:   Intra-op Monitoring Plan Comments:   Post Op Pain Control Plan:   Post Op Pain Control Plan Comments:   Induction:   IV  Beta Blocker:  Patient is not currently on a Beta-Blocker (No further documentation required).       Informed Consent: Patient understands risks and agrees with Anesthesia plan.  Questions answered. Anesthesia consent signed with patient.  ASA Score: 2     Day of Surgery Review of History & Physical:    H&P update referred to the surgeon.         Ready For Surgery From Anesthesia Perspective.

## 2017-03-07 NOTE — TRANSFER OF CARE
Anesthesia Transfer of Care Note    Patient: Natividad Acosta    Procedure(s) Performed: Procedure(s) (LRB):  REPAIR-HERNIA-inguinal (Left)    Patient location: PACU    Anesthesia Type: general    Transport from OR: Transported from OR on 2-3 L/min O2 by NC with adequate spontaneous ventilation    Post pain: adequate analgesia    Post assessment: no apparent anesthetic complications    Post vital signs: stable    Level of consciousness: awake    Nausea/Vomiting: no nausea/vomiting    Complications: none          Last vitals:   Visit Vitals    /63    Pulse 93    Temp 36.8 °C (98.2 °F) (Oral)    Resp 20    Ht 5' (1.524 m)    Wt 49.9 kg (110 lb)    SpO2 100%    Breastfeeding No    BMI 21.48 kg/m2

## 2017-03-07 NOTE — IP AVS SNAPSHOT
[address] [place] [address]  Phone: [phone]           Patient Discharge Instructions     Our goal is to set you up for success. This packet includes information on your condition, medications, and your home care. It will help you to care for yourself so you don't get sicker and need to go back to the hospital.     Please ask your nurse if you have any questions.        There are many details to remember when preparing to leave the hospital. Here is what you will need to do:    1. Take your medicine. If you are prescribed medications, review your Medication List in the following pages. You may have new medications to  at the pharmacy and others that you'll need to stop taking. Review the instructions for how and when to take your medications. Talk with your doctor or nurses if you are unsure of what to do.     2. Go to your follow-up appointments. Specific follow-up information is listed in the following pages. Your may be contacted by a transition nurse or clinical provider about future appointments. Be sure we have all of the phone numbers to reach you, if needed. Please contact your provider's office if you are unable to make an appointment.     3. Watch for warning signs. Your doctor or nurse will give you detailed warning signs to watch for and when to call for assistance. These instructions may also include educational information about your condition. If you experience any of warning signs to your health, call your doctor.               [facility]  Unless otherwise directed by your provider, please contact [facility], our nurse care line that is available for 24/7 assistance.     [phone] (toll-free)    Registered nurses in the [facility] provide clinical advisement, health education, appointment booking, and other advisory services.                    ** Verify the list of medication(s) below is accurate and up to date.  Carry this with you in case of emergency. If your medications have changed, please notify your healthcare provider.             Medication List      CHANGE how you take these medications        Additional Info                      * hydrocodone-acetaminophen 10-325mg  mg Tab   Commonly known as:  NORCO   Quantity:  20 tablet   Refills:  0   Dose:  1 tablet   What changed:  Another medication with the same name was added. Make sure you understand how and when to take each.    Instructions:  Take 1 tablet by mouth every 4 (four) hours as needed.     Begin Date    AM    Noon    PM    Bedtime       * hydrocodone-acetaminophen 10-325mg  mg Tab   Commonly known as:  NORCO   Quantity:  30 tablet   Refills:  0   Dose:  1 tablet   What changed:  You were already taking a medication with the same name, and this prescription was added. Make sure you understand how and when to take each.    Instructions:  Take 1 tablet by mouth every 4 (four) hours as needed for Pain.     Begin Date    AM    Noon    PM    Bedtime       * Notice:  This list has 2 medication(s) that are the same as other medications prescribed for you. Read the directions carefully, and ask your doctor or other care provider to review them with you.      CONTINUE taking these medications        Additional Info                      ONE DAILY MULTIVITAMIN per tablet   Refills:  0   Dose:  1 tablet   Generic drug:  multivitamin    Instructions:  Take 1 tablet by mouth once daily.     Begin Date    AM    Noon    PM    Bedtime       phenylephrine 10 MG Tab   Commonly known as:  SUDAFED PE   Refills:  0   Dose:  10 mg    Instructions:  Take 10 mg by mouth every 4 (four) hours as needed.     Begin Date    AM    Noon    PM    Bedtime       promethazine 25 MG suppository   Commonly known as:  PHENERGAN   Quantity:  10 suppository   Refills:  0   Dose:  25 mg    Instructions:  Place 1 suppository (25 mg total) rectally every 6 (six) hours as needed for Nausea.      Begin Date    AM    Noon    PM    Bedtime         ASK your doctor about these medications        Additional Info                      tamsulosin 0.4 mg Cp24   Commonly known as:  FLOMAX   Refills:  0    Instructions:  TK 1 C PO QD     Begin Date    AM    Noon    PM    Bedtime            Where to Get Your Medications      These medications were sent to [id number] - [place] - [address] AT [facility] [address]  [address], [place] [address]     Phone:  [phone]     hydrocodone-acetaminophen 10-325mg  mg Tab                  Please bring to all follow up appointments:    1. A copy of your discharge instructions.  2. All medicines you are currently taking in their original bottles.  3. Identification and insurance card.    Please arrive 15 minutes ahead of scheduled appointment time.    Please call 24 hours in advance if you must reschedule your appointment and/or time.        Your Scheduled Appointments     Mar 20, 2017  1:20 PM CDT   [facility]    [address]  [facility] [address]   [phone]              Follow-up Information     Follow up with Blake Medley MD. Schedule an appointment as soon as possible for a visit in 2 weeks.    Specialties:  General Surgery, Surgery    Why:  For wound re-check    Contact information:    [address] [facility]  [address]  [facility] [address]  [phone]          Discharge Instructions     Future Orders    Activity as tolerated     Call MD for:  difficulty breathing, headache or visual disturbances     Call MD for:  extreme fatigue     Call MD for:  hives     Call MD for:  persistent dizziness or light-headedness     Call MD for:  persistent nausea and vomiting     Call MD for:  redness, tenderness, or signs of infection (pain, swelling, redness, odor or green/yellow discharge around incision site)     Call MD for:  severe uncontrolled pain  "    Call MD for:  temperature >100.4     Diet general     Questions:    Total calories:      Fat restriction, if any:      Protein restriction, if any:      Na restriction, if any:      Fluid restriction:      Additional restrictions:      Lifting restrictions     Shower on day dressing removed (No bath)         Discharge Instructions       Discharge Instructions: After Your Surgery/Procedure  Youve just had surgery. During surgery you were given medicine called anesthesia to keep you relaxed and free of pain. After surgery you may have some pain or nausea. This is common. Here are some tips for feeling better and getting well after surgery.     Stay on schedule with your medication.   Going home  Your doctor or nurse will show you how to take care of yourself when you go home. He or she will also answer your questions. Have an adult family member or friend drive you home.      For your safety we recommend these precaution for the first 24 hours after your procedure:  · Do not drive or use heavy equipment.  · Do not make important decisions or sign legal papers.  · Do not drink alcohol.  · Have someone stay with you, if needed. He or she can watch for problems and help keep you safe.  · Your concentration, balance, coordination, and judgement may be impaired for many hours after anesthesia.  Use caution when ambulating or standing up.     · You may feel weak and "washed out" after anesthesia and surgery.      Subtle residual effects of general anesthesia or sedation with regional / local anesthesia can last more than 24 hours.  Rest for the remainder of the day or longer if your Doctor/Surgeon has advised you to do so.  Although you may feel normal within the first 24 hours, your reflexes and mental ability may be impaired without you realizing it.  You may feel dizzy, lightheaded or sleepy for 24 hours or longer.      Be sure to go to all follow-up visits with your doctor. And rest after your surgery for as long " as your doctor tells you to.  Coping with pain  If you have pain after surgery, pain medicine will help you feel better. Take it as told, before pain becomes severe. Also, ask your doctor or pharmacist about other ways to control pain. This might be with heat, ice, or relaxation. And follow any other instructions your surgeon or nurse gives you.  Tips for taking pain medicine  To get the best relief possible, remember these points:  · Pain medicines can upset your stomach. Taking them with a little food may help.  · Most pain relievers taken by mouth need at least 20 to 30 minutes to start to work.  · Taking medicine on a schedule can help you remember to take it. Try to time your medicine so that you can take it before starting an activity. This might be before you get dressed, go for a walk, or sit down for dinner.  · Constipation is a common side effect of pain medicines. Call your doctor before taking any medicines such as laxatives or stool softeners to help ease constipation. Also ask if you should skip any foods. Drinking lots of fluids and eating foods such as fruits and vegetables that are high in fiber can also help. Remember, do not take laxatives unless your surgeon has prescribed them.  · Drinking alcohol and taking pain medicine can cause dizziness and slow your breathing. It can even be deadly. Do not drink alcohol while taking pain medicine.  · Pain medicine can make you react more slowly to things. Do not drive or run machinery while taking pain medicine.  Your health care provider may tell you to take acetaminophen to help ease your pain. Ask him or her how much you are supposed to take each day. Acetaminophen or other pain relievers may interact with your prescription medicines or other over-the-counter (OTC) drugs. Some prescription medicines have acetaminophen and other ingredients. Using both prescription and OTC acetaminophen for pain can cause you to overdose. Read the labels on your OTC  medicines with care. This will help you to clearly know the list of ingredients, how much to take, and any warnings. It may also help you not take too much acetaminophen. If you have questions or do not understand the information, ask your pharmacist or health care provider to explain it to you before you take the OTC medicine.  Managing nausea  Some people have an upset stomach after surgery. This is often because of anesthesia, pain, or pain medicine, or the stress of surgery. These tips will help you handle nausea and eat healthy foods as you get better. If you were on a special food plan before surgery, ask your doctor if you should follow it while you get better. These tips may help:  · Do not push yourself to eat. Your body will tell you when to eat and how much.  · Start off with clear liquids and soup. They are easier to digest.  · Next try semi-solid foods, such as mashed potatoes, applesauce, and gelatin, as you feel ready.  · Slowly move to solid foods. Dont eat fatty, rich, or spicy foods at first.  · Do not force yourself to have 3 large meals a day. Instead eat smaller amounts more often.  · Take pain medicines with a small amount of solid food, such as crackers or toast, to avoid nausea.     Call your surgeon if  · You still have pain an hour after taking medicine. The medicine may not be strong enough.  · You feel too sleepy, dizzy, or groggy. The medicine may be too strong.  · You have side effects like nausea, vomiting, or skin changes, such as rash, itching, or hives.       If you have obstructive sleep apnea  You were given anesthesia medicine during surgery to keep you comfortable and free of pain. After surgery, you may have more apnea spells because of this medicine and other medicines you were given. The spells may last longer than usual.   At home:  · Keep using the continuous positive airway pressure (CPAP) device when you sleep. Unless your health care provider tells you not to, use it  when you sleep, day or night. CPAP is a common device used to treat obstructive sleep apnea.  · Talk with your provider before taking any pain medicine, muscle relaxants, or sedatives. Your provider will tell you about the possible dangers of taking these medicines.  © 5999-3578 [employer]. [address] [place] [address]. All rights reserved. This information is not intended as a substitute for professional medical care. Always follow your healthcare professional's instructions.    Discharge Instructions for Open Hernia Repair  You had a procedure called open hernia repair. Also called a rupture, a hernia is a tear or weakness in the wall of the belly. This weakness may be present at birth. Or it can be caused by the wear and tear of daily living. Hernias may get worse with time or with physical stress. But surgery can help repair the weakness and eliminate symptoms.  Activity after surgery  Recommendations include the following:  · After surgery, take it easy for the rest of the day. If you had general anesthesia, dont use machinery or power tools, drink alcohol, or make any major decisions for at least the first 24 hours.  · Dont drive while you are still taking opioid pain medicine, and dont drive until you are able to step firmly on the brake pedal without hesitation.  · Ask others to help with chores and errands while you recover.  · Dont lift anything heavier than 10 pounds until your healthcare provider says its OK.  · Dont mow the lawn, use a vacuum , or do other strenuous activities until your healthcare provider says its OK.  · Walk as often as you feel able.  · Continue the coughing and deep breathing exercises that you learned in the hospital.  · Ask your healthcare provider when you can expect to return to work.  · Avoid constipation:  ¨ Eat fruits, vegetables, and whole grains.  ¨ Drink 6 to 8 glasses of water a day, unless otherwise instructed.  ¨ Use a laxative  or a mild stool softener as instructed by your healthcare provider.  Bandage and incision care  Tips include the following:  · Do not get the bandage or wound wet for 48 hours.  · If strips of tape were used to close your incision, dont pull them off. Let them fall off on their own.  · Remove any gauze bandage in 48 hours.  · Wash your incision with mild soap and water. Pat it dry. Dont use oils, powders, or lotions on your incision. Do not soak your incision or take tub baths until cleared by your healthcare provider.  Follow-up care  Keep follow-up appointments during your recovery. These allow your healthcare provider to check your progress and make sure youre healing well. You may also need to have your stitches, staples, or bandage removed. During office visits, tell your healthcare provider if you have any new symptoms. And be sure to ask any questions you have.     When to call your healthcare provider  Call your healthcare provider immediately if you have any of the following:  · A large amount of swelling or bruising (some testicular swelling and bruising is common)  · Bleeding  · Increasing pain  · Increased redness or drainage of the incision  · Fever of 100.4°F (38.0°C) or higher, or as directed by your healthcare provider  · Trouble urinating  · Nausea or vomiting   Date Last Reviewed: 7/1/2016  © 4134-0281 [employer]. [address] [place] [address]. All rights reserved. This information is not intended as a substitute for professional medical care. Always follow your healthcare professional's instructions.            Primary Diagnosis     Your primary diagnosis was:  Inguinal Hernia Of Left Side Without Obstruction Or Gangrene      Admission Information     Date & Time Provider Department CSN    3/7/2017  9:56 AM Blake Medley MD [facility] [id number]      Care Providers     Provider Role Specialty Primary office phone    Blake Medley MD Attending  Provider General Surgery [phone]    Blake Medley MD Surgeon  General Surgery [phone]      Your Vitals Were     BP Pulse Temp Resp Height Weight    108/64 93 97.6 °F (36.4 °C) (Oral) 20 5' (1.524 m) 49.9 kg (110 lb)    SpO2 BMI             100% 21.48 kg/m2         Recent Lab Values     No lab values to display.      Pending Labs     Order Current Status    Specimen to Pathology - Surgery In process      Allergies as of 3/7/2017        Reactions    Stadol [Butorphanol Tartrate] Anaphylaxis    Demerol [Meperidine]     vomiting    Levaquin [Levofloxacin] Hives    Morphine     vomiting    Reglan [Metoclopramide] Hives    Toradol [Ketorolac] Other (See Comments)    Told not to take due to kidneys    Zofran [Ondansetron Hcl (Pf)]     Iodine Rash      Advance Directives     An advance directive is a document which, in the event you are no longer able to make decisions for yourself, tells your healthcare team what kind of treatment you do or do not want to receive, or who you would like to make those decisions for you.  If you do not currently have an advance directive, [facility] encourages you to create one.  For more information call:  [phone]), [phone] ([phone]),  or log on to [email or web address].        Smoking Cessation     If you would like to quit smoking:   You may be eligible for free services if you are a [place] resident and started smoking cigarettes before September 1, 1988.  Call the Smoking Cessation Trust (SCT) toll free at (905) 064-5216 or (118) 179-0920.   Call 1-800-QUIT-NOW if you do not meet the above criteria.            Language Assistance Services     ATTENTION: Language assistance services are available, free of charge. Please call [phone].      ATENCIÓN: Si habla español, tiene a betancourt disposición servicios gratuitos de asistencia lingüística. Llame al [phone].     CHÚ Ý: N?u b?n nói Ti?ng Vi?t, có các d?ch v? h? tr? ngôn ng? mi?n jamir clay  page holder?n. G?i s? [phone].         [facility] complies with applicable Federal civil rights laws and does not discriminate on the basis of race, color, national origin, age, disability, or sex.

## 2017-03-07 NOTE — ANESTHESIA POSTPROCEDURE EVALUATION
Anesthesia Post Evaluation    Patient: Natividad Acosta    Procedure(s) Performed: Procedure(s) (LRB):  REPAIR-HERNIA-inguinal (Left)    Final Anesthesia Type: general  Patient location during evaluation: PACU  Patient participation: Yes- Able to Participate  Level of consciousness: awake and alert  Post-procedure vital signs: reviewed and stable  Pain management: adequate  Airway patency: patent  PONV status at discharge: No PONV  Anesthetic complications: no      Cardiovascular status: blood pressure returned to baseline and hemodynamically stable  Respiratory status: unassisted  Hydration status: euvolemic  Follow-up not needed.        Visit Vitals    /63    Pulse 93    Temp 36.8 °C (98.3 °F) (Oral)    Resp 20    Ht 5' (1.524 m)    Wt 49.9 kg (110 lb)    SpO2 100%    Breastfeeding No    BMI 21.48 kg/m2       Pain/Magdalena Score: Pain Assessment Performed: Yes (3/7/2017 12:55 PM)  Presence of Pain: complains of pain/discomfort (3/7/2017 10:22 AM)  Pain Rating Prior to Med Admin: 9 (3/7/2017  1:00 PM)

## 2017-03-07 NOTE — OP NOTE
DATE OF PROCEDURE:  03/07/2017.    PREOPERATIVE DIAGNOSIS:  Incisional hernia.    POSTOPERATIVE DIAGNOSIS:  Left indirect inguinal hernia.    OPERATION:  Repair of left indirect inguinal hernia and excision left inguinal   lymph node.    SURGEON:  Blake Medley M.D.    ANESTHESIA:  General.    PROCEDURE AND FINDINGS:  With the patient in the supine position under   satisfactory general endotracheal anesthesia, the abdomen was prepped and draped   in the usual manner for surgery.  The skin and subcutaneous tissue was   infiltrated with local anesthetic solution in the previous Pfannenstiel skin   incision.  Skin incision was then performed overlying a small palpable deep   subcutaneous mass.  Dissection was carried down to the external oblique.    Hemostasis was maintained with electrocautery.  There was noted to be a black   stained inguinal lymph node which was less than 1 cm in size.  It was, however,   excised and sent for pathologic examination.  Hemostasis was maintained with   electrocautery.  There was noted to be a bulge in the external oblique.  This was infiltrated   with local anesthetic solution and the external oblique was opened.  There was   noted to be a small hernia coming through the internal ring.  It did not extend   down the canal.  The round ligament was identified, clamped, divided and   ligated.  The ilioinguinal nerve was dissected free and kept out of the field of   dissection.  The hernia was then repaired primarily with running 2-0 Novafil   suture.  Because of marked adhesions from the previous Pfannenstiel incision it   was elected not to do extensive dissection in order to place the mesh   reinforcement as the hernia was only approximately 1 cm in diameter.  The   ilioinguinal nerve was placed back into the canal.  The external oblique was   closed with running 2-0 Vicryl, Estrelal's with 3-0 Vicryl and the skin was closed   with 4-0 Monocryl subcuticular stitch.  The wound was washed and  dried.    Steri-Strips and sterile dressings were applied.  The patient tolerated the   procedure well and was sent to Recovery in stable condition.    ESTIMATED BLOOD LOSS:  Minimal.    COMPLICATIONS:  None.    DRAINS:  None.    SPECIMEN:  Consisting of inguinal lymph node was sent to Pathology.      APRYL  dd: 03/07/2017 12:48:41 (CST)  td: 03/07/2017 17:40:49 (CST)  Doc ID   #[id number]  Job ID #[id number]    CC:

## 2017-03-07 NOTE — HISTORY AND PHYSICAL ADDENDUM
Pt experiencing severe anxiety over procedure.  MD ordered versed to help pt relax.  After  1 mg, pt did not feel any relief. Gave second dose per order. Pt is slightly less anxious.  Family at bedside.

## 2017-03-07 NOTE — DISCHARGE INSTRUCTIONS
"Discharge Instructions: After Your Surgery/Procedure  Youve just had surgery. During surgery you were given medicine called anesthesia to keep you relaxed and free of pain. After surgery you may have some pain or nausea. This is common. Here are some tips for feeling better and getting well after surgery.     Stay on schedule with your medication.   Going home  Your doctor or nurse will show you how to take care of yourself when you go home. He or she will also answer your questions. Have an adult family member or friend drive you home.      For your safety we recommend these precaution for the first 24 hours after your procedure:  · Do not drive or use heavy equipment.  · Do not make important decisions or sign legal papers.  · Do not drink alcohol.  · Have someone stay with you, if needed. He or she can watch for problems and help keep you safe.  · Your concentration, balance, coordination, and judgement may be impaired for many hours after anesthesia.  Use caution when ambulating or standing up.     · You may feel weak and "washed out" after anesthesia and surgery.      Subtle residual effects of general anesthesia or sedation with regional / local anesthesia can last more than 24 hours.  Rest for the remainder of the day or longer if your Doctor/Surgeon has advised you to do so.  Although you may feel normal within the first 24 hours, your reflexes and mental ability may be impaired without you realizing it.  You may feel dizzy, lightheaded or sleepy for 24 hours or longer.      Be sure to go to all follow-up visits with your doctor. And rest after your surgery for as long as your doctor tells you to.  Coping with pain  If you have pain after surgery, pain medicine will help you feel better. Take it as told, before pain becomes severe. Also, ask your doctor or pharmacist about other ways to control pain. This might be with heat, ice, or relaxation. And follow any other instructions your surgeon or nurse gives " you.  Tips for taking pain medicine  To get the best relief possible, remember these points:  · Pain medicines can upset your stomach. Taking them with a little food may help.  · Most pain relievers taken by mouth need at least 20 to 30 minutes to start to work.  · Taking medicine on a schedule can help you remember to take it. Try to time your medicine so that you can take it before starting an activity. This might be before you get dressed, go for a walk, or sit down for dinner.  · Constipation is a common side effect of pain medicines. Call your doctor before taking any medicines such as laxatives or stool softeners to help ease constipation. Also ask if you should skip any foods. Drinking lots of fluids and eating foods such as fruits and vegetables that are high in fiber can also help. Remember, do not take laxatives unless your surgeon has prescribed them.  · Drinking alcohol and taking pain medicine can cause dizziness and slow your breathing. It can even be deadly. Do not drink alcohol while taking pain medicine.  · Pain medicine can make you react more slowly to things. Do not drive or run machinery while taking pain medicine.  Your health care provider may tell you to take acetaminophen to help ease your pain. Ask him or her how much you are supposed to take each day. Acetaminophen or other pain relievers may interact with your prescription medicines or other over-the-counter (OTC) drugs. Some prescription medicines have acetaminophen and other ingredients. Using both prescription and OTC acetaminophen for pain can cause you to overdose. Read the labels on your OTC medicines with care. This will help you to clearly know the list of ingredients, how much to take, and any warnings. It may also help you not take too much acetaminophen. If you have questions or do not understand the information, ask your pharmacist or health care provider to explain it to you before you take the OTC medicine.  Managing  nausea  Some people have an upset stomach after surgery. This is often because of anesthesia, pain, or pain medicine, or the stress of surgery. These tips will help you handle nausea and eat healthy foods as you get better. If you were on a special food plan before surgery, ask your doctor if you should follow it while you get better. These tips may help:  · Do not push yourself to eat. Your body will tell you when to eat and how much.  · Start off with clear liquids and soup. They are easier to digest.  · Next try semi-solid foods, such as mashed potatoes, applesauce, and gelatin, as you feel ready.  · Slowly move to solid foods. Dont eat fatty, rich, or spicy foods at first.  · Do not force yourself to have 3 large meals a day. Instead eat smaller amounts more often.  · Take pain medicines with a small amount of solid food, such as crackers or toast, to avoid nausea.     Call your surgeon if  · You still have pain an hour after taking medicine. The medicine may not be strong enough.  · You feel too sleepy, dizzy, or groggy. The medicine may be too strong.  · You have side effects like nausea, vomiting, or skin changes, such as rash, itching, or hives.       If you have obstructive sleep apnea  You were given anesthesia medicine during surgery to keep you comfortable and free of pain. After surgery, you may have more apnea spells because of this medicine and other medicines you were given. The spells may last longer than usual.   At home:  · Keep using the continuous positive airway pressure (CPAP) device when you sleep. Unless your health care provider tells you not to, use it when you sleep, day or night. CPAP is a common device used to treat obstructive sleep apnea.  · Talk with your provider before taking any pain medicine, muscle relaxants, or sedatives. Your provider will tell you about the possible dangers of taking these medicines.  © 1013-7163 [employer]. [address] [place] [address]  [place] [address]. All rights reserved. This information is not intended as a substitute for professional medical care. Always follow your healthcare professional's instructions.    Discharge Instructions for Open Hernia Repair  You had a procedure called open hernia repair. Also called a rupture, a hernia is a tear or weakness in the wall of the belly. This weakness may be present at birth. Or it can be caused by the wear and tear of daily living. Hernias may get worse with time or with physical stress. But surgery can help repair the weakness and eliminate symptoms.  Activity after surgery  Recommendations include the following:  · After surgery, take it easy for the rest of the day. If you had general anesthesia, dont use machinery or power tools, drink alcohol, or make any major decisions for at least the first 24 hours.  · Dont drive while you are still taking opioid pain medicine, and dont drive until you are able to step firmly on the brake pedal without hesitation.  · Ask others to help with chores and errands while you recover.  · Dont lift anything heavier than 10 pounds until your healthcare provider says its OK.  · Dont mow the lawn, use a vacuum , or do other strenuous activities until your healthcare provider says its OK.  · Walk as often as you feel able.  · Continue the coughing and deep breathing exercises that you learned in the hospital.  · Ask your healthcare provider when you can expect to return to work.  · Avoid constipation:  ¨ Eat fruits, vegetables, and whole grains.  ¨ Drink 6 to 8 glasses of water a day, unless otherwise instructed.  ¨ Use a laxative or a mild stool softener as instructed by your healthcare provider.  Bandage and incision care  Tips include the following:  · Do not get the bandage or wound wet for 48 hours.  · If strips of tape were used to close your incision, dont pull them off. Let them fall off on their own.  · Remove any gauze bandage in 48 hours.  · Wash your  incision with mild soap and water. Pat it dry. Dont use oils, powders, or lotions on your incision. Do not soak your incision or take tub baths until cleared by your healthcare provider.  Follow-up care  Keep follow-up appointments during your recovery. These allow your healthcare provider to check your progress and make sure youre healing well. You may also need to have your stitches, staples, or bandage removed. During office visits, tell your healthcare provider if you have any new symptoms. And be sure to ask any questions you have.     When to call your healthcare provider  Call your healthcare provider immediately if you have any of the following:  · A large amount of swelling or bruising (some testicular swelling and bruising is common)  · Bleeding  · Increasing pain  · Increased redness or drainage of the incision  · Fever of 100.4°F (38.0°C) or higher, or as directed by your healthcare provider  · Trouble urinating  · Nausea or vomiting   Date Last Reviewed: 7/1/2016  © 9819-1068 [employer]. [address] [place] [address], [place] [address]. All rights reserved. This information is not intended as a substitute for professional medical care. Always follow your healthcare professional's instructions.

## 2017-03-07 NOTE — BRIEF OP NOTE
[facility]  Brief Operative Note    SUMMARY     Surgery Date: 3/7/2017     Surgeon(s) and Role:     * Blake Medley MD - Primary    Assisting Surgeon: None    Pre-op Diagnosis:  Incisional hernia, without obstruction or gangrene [K43.2]    Post-op Diagnosis:  Post-Op Diagnosis Codes:     *Inguinal hernia, without obstruction or gangrene [K43.2]    Procedure(s) (LRB):  REPAIR-HERNIA-inguinal (Left), Excision left inguinal lymph node    Anesthesia: General    Description of the findings of the procedure: Small indirect inguinal hernia, Black inguinal lymph node.    Estimated Blood Loss: * 10 cc*         Specimens:   Specimen (12h ago through future)    Start     Ordered    03/07/17 1217  Specimen to Pathology - Surgery  Once     Comments:  Pre-op Diagnosis: Incisional hernia, without obstruction or gangrene [K43.2]    Post-op Diagnosis: same     Procedure(s):  REPAIR-HERNIA-INCISIONAL     Number of specimens: 1    Name of specimens: Left Inguinal Node    03/07/17 1217

## 2017-03-08 DIAGNOSIS — G89.18 POST-OP PAIN: Primary | ICD-10-CM

## 2017-03-08 NOTE — TELEPHONE ENCOUNTER
----- Message from Hodan Masters sent at 3/8/2017 11:40 AM CST -----  Contact: self [phone]  Patient is requesting a call back from the nurse stated she have hernia surgery yesterday, and having to double up on pain meds, in a lot of pain.  Requesting a call.    Please call the patient upon request at phone number [phone].

## 2017-03-08 NOTE — TELEPHONE ENCOUNTER
"    Reason for Disposition   Caller has URGENT question and triager unable to answer question     Numbness of her left thigh, mid thigh down to and all around the knee.  On call surgeon for Dr Medley called at patient's request.  Message to Dr Medley. Please contact caller directly with any additional care advice.    Answer Assessment - Initial Assessment Questions  1. SYMPTOM: "What's the main symptom you're concerned about?" (e.g., pain, fever, vomiting)      Numbness of my left thigh, middle of the top of my thigh, to my knee.    2. ONSET: "When did ________  start?"      I just noticed it an hour ago.    3. SURGERY: "What surgery was performed?"      Left inguinal hernia repair.    4. DATE of SURGERY: "When was surgery performed?"       This morning.    5. ANESTHESIA: " What type of anesthesia did you have?" (e.g., general, spinal, epidural, local)      General     6. PAIN: "Is there any pain?" If so, ask: "How bad is it?"  (Scale 1-10; or mild, moderate, severe)      Pain is controlled with medication.    7. FEVER: "Do you have a fever?" If so, ask: "What is your temperature, how was it measured, and when did it start?"      No fever.    8. VOMITING: "Is there any vomiting?" If yes, ask: "How many times?"      No vomiting.    9. BLEEDING: "Is there any bleeding?" If so, ask: "How much?" and "Where?"      No bleeding.    10. OTHER SYMPTOMS: "Do you have any other symptoms?" (e.g., drainage from wound, painful urination, constipation)        Numbness of the top of my left thigh.    Protocols used: ST POST-OP SYMPTOMS AND [id number]      "

## 2017-03-09 ENCOUNTER — TELEPHONE (OUTPATIENT)
Dept: SURGERY | Facility: CLINIC | Age: 42
End: 2017-03-09

## 2017-03-09 RX ORDER — HYDROCODONE BITARTRATE AND ACETAMINOPHEN 10; 325 MG/1; MG/1
1 TABLET ORAL EVERY 4 HOURS PRN
Qty: 30 TABLET | Refills: 0 | Status: SHIPPED | OUTPATIENT
Start: 2017-03-09 | End: 2017-03-13 | Stop reason: ALTCHOICE

## 2017-03-09 NOTE — TELEPHONE ENCOUNTER
Patient calling in with complaints of temp of 99 degrees.  She states she does have a head cold and is coughing.  Advised that a low grade temp is normal a few days after surgery and it could also be from her cold.  Advised to call if temp is over 100.4.

## 2017-03-09 NOTE — TELEPHONE ENCOUNTER
----- Message from Lucia Espinosa sent at 3/9/2017 10:12 AM CST -----  Contact: pt [phone]  Patient called and asked for a call back her incision has started bleeding from left side also she is needs some pain medication to be called in.

## 2017-03-09 NOTE — TELEPHONE ENCOUNTER
Patient is taking 2 Norco every 4 hours around the clock, she has a cough and congestion and had some bleeding from the incision site.  Advised to apply an ice pack 2-3 times a day, 20 minutes at a time and splint site with a pillow when coughing.  I will send a refill request to Dr Medely.  She states she has enough pills to last thru tonight  Bandage has been changed and the bleeding has stopped.  Will continue to monitor.

## 2017-03-13 ENCOUNTER — OFFICE VISIT (OUTPATIENT)
Dept: SURGERY | Facility: CLINIC | Age: 42
End: 2017-03-13
Payer: MEDICAID

## 2017-03-13 ENCOUNTER — TELEPHONE (OUTPATIENT)
Dept: SURGERY | Facility: CLINIC | Age: 42
End: 2017-03-13

## 2017-03-13 VITALS — TEMPERATURE: 99 F

## 2017-03-13 DIAGNOSIS — Z98.890 POST-OPERATIVE STATE: Primary | ICD-10-CM

## 2017-03-13 PROCEDURE — 99212 OFFICE O/P EST SF 10 MIN: CPT | Mod: PBBFAC,PO | Performed by: SURGERY

## 2017-03-13 PROCEDURE — 99024 POSTOP FOLLOW-UP VISIT: CPT | Mod: ,,, | Performed by: SURGERY

## 2017-03-13 PROCEDURE — 99999 PR PBB SHADOW E&M-EST. PATIENT-LVL II: CPT | Mod: PBBFAC,,, | Performed by: SURGERY

## 2017-03-13 RX ORDER — OXYCODONE AND ACETAMINOPHEN 10; 325 MG/1; MG/1
1 TABLET ORAL EVERY 4 HOURS PRN
Qty: 30 TABLET | Refills: 0 | Status: SHIPPED | OUTPATIENT
Start: 2017-03-13 | End: 2017-03-17 | Stop reason: SDUPTHER

## 2017-03-13 NOTE — TELEPHONE ENCOUNTER
She says that she has moved her bowels and she has an upset stomach since yesterday and she is just miserable.  She will come in this afternoon between 3 and 4 to be seen.

## 2017-03-13 NOTE — TELEPHONE ENCOUNTER
Patient called in this morning stating that she is still in a lot of pain, is taking 2 pain pills at a time but is hurting so bad she is nauseated and threw up.  She still has a lot of swelling that seems worse than on Friday.  Should she be seen today or do you want to send in a stronger pain medicine?

## 2017-03-13 NOTE — PROGRESS NOTES
S/P LIH repair.  States she is still having severe pain.  Taking hydrocodone with some relief.  Pain below incision and laterally (not in distribution of nerve).  Incision healing nicely - some ecchymoses.  Advised on warm compresses.  Unable to take NSAIDs.  Trial of Percocet.

## 2017-03-17 DIAGNOSIS — G89.18 POST-OP PAIN: Primary | ICD-10-CM

## 2017-03-17 RX ORDER — OXYCODONE AND ACETAMINOPHEN 10; 325 MG/1; MG/1
1 TABLET ORAL EVERY 4 HOURS PRN
Qty: 30 TABLET | Refills: 0 | Status: SHIPPED | OUTPATIENT
Start: 2017-03-17 | End: 2017-05-15

## 2017-03-17 NOTE — TELEPHONE ENCOUNTER
----- Message from Jessica Leos sent at 3/16/2017  2:51 PM CDT -----  Contact: self [phone]  She is calling to let you know that the medication is helping her.  She is going out of town tomorrow and will be doing a lot of walking.  She is requesting that you send in a refill of that medication.  Please let her know.  Thank you!

## 2017-05-15 ENCOUNTER — HOSPITAL ENCOUNTER (EMERGENCY)
Facility: HOSPITAL | Age: 42
Discharge: HOME | End: 2017-05-15
Attending: EMERGENCY MEDICINE
Payer: MEDICAID

## 2017-05-15 VITALS
RESPIRATION RATE: 20 BRPM | TEMPERATURE: 98 F | SYSTOLIC BLOOD PRESSURE: 128 MMHG | DIASTOLIC BLOOD PRESSURE: 72 MMHG | WEIGHT: 115 LBS | BODY MASS INDEX: 22.58 KG/M2 | HEIGHT: 60 IN | OXYGEN SATURATION: 100 % | HEART RATE: 72 BPM

## 2017-05-15 DIAGNOSIS — N12 PYELONEPHRITIS: Primary | ICD-10-CM

## 2017-05-15 DIAGNOSIS — R10.9 RIGHT FLANK PAIN: ICD-10-CM

## 2017-05-15 LAB
ANION GAP SERPL CALC-SCNC: 12 MMOL/L
BACTERIA #/AREA URNS HPF: ABNORMAL /HPF
BASOPHILS # BLD AUTO: 0 K/UL
BASOPHILS NFR BLD: 0.3 %
BILIRUB UR QL STRIP: ABNORMAL
BUN SERPL-MCNC: 8 MG/DL
CALCIUM SERPL-MCNC: 9.7 MG/DL
CHLORIDE SERPL-SCNC: 102 MMOL/L
CLARITY UR: CLEAR
CO2 SERPL-SCNC: 26 MMOL/L
COLOR UR: ABNORMAL
CREAT SERPL-MCNC: 0.9 MG/DL
DIFFERENTIAL METHOD: ABNORMAL
EOSINOPHIL # BLD AUTO: 0.1 K/UL
EOSINOPHIL NFR BLD: 0.8 %
ERYTHROCYTE [DISTWIDTH] IN BLOOD BY AUTOMATED COUNT: 12.8 %
EST. GFR  (AFRICAN AMERICAN): >60 ML/MIN/1.73 M^2
EST. GFR  (NON AFRICAN AMERICAN): >60 ML/MIN/1.73 M^2
GLUCOSE SERPL-MCNC: 91 MG/DL
GLUCOSE UR QL STRIP: ABNORMAL
HCT VFR BLD AUTO: 45.2 %
HGB BLD-MCNC: 15.5 G/DL
HGB UR QL STRIP: ABNORMAL
HYALINE CASTS #/AREA URNS LPF: 1 /LPF
KETONES UR QL STRIP: ABNORMAL
LEUKOCYTE ESTERASE UR QL STRIP: ABNORMAL
LYMPHOCYTES # BLD AUTO: 2.4 K/UL
LYMPHOCYTES NFR BLD: 18.6 %
MCH RBC QN AUTO: 34.1 PG
MCHC RBC AUTO-ENTMCNC: 34.3 %
MCV RBC AUTO: 100 FL
MICROSCOPIC COMMENT: ABNORMAL
MONOCYTES # BLD AUTO: 0.3 K/UL
MONOCYTES NFR BLD: 2.2 %
NEUTROPHILS # BLD AUTO: 10.1 K/UL
NEUTROPHILS NFR BLD: 78.1 %
NITRITE UR QL STRIP: ABNORMAL
PH UR STRIP: ABNORMAL [PH] (ref 5–8)
PLATELET # BLD AUTO: 335 K/UL
PMV BLD AUTO: 7.8 FL
POTASSIUM SERPL-SCNC: 4.2 MMOL/L
PROT UR QL STRIP: ABNORMAL
RBC # BLD AUTO: 4.54 M/UL
RBC #/AREA URNS HPF: 3 /HPF (ref 0–4)
SODIUM SERPL-SCNC: 140 MMOL/L
SP GR UR STRIP: ABNORMAL (ref 1–1.03)
SQUAMOUS #/AREA URNS HPF: 5 /HPF
URN SPEC COLLECT METH UR: ABNORMAL
UROBILINOGEN UR STRIP-ACNC: ABNORMAL EU/DL
WBC # BLD AUTO: 12.9 K/UL
WBC #/AREA URNS HPF: 30 /HPF (ref 0–5)

## 2017-05-15 PROCEDURE — 87186 SC STD MICRODIL/AGAR DIL: CPT

## 2017-05-15 PROCEDURE — 36415 COLL VENOUS BLD VENIPUNCTURE: CPT

## 2017-05-15 PROCEDURE — 81000 URINALYSIS NONAUTO W/SCOPE: CPT

## 2017-05-15 PROCEDURE — 80048 BASIC METABOLIC PNL TOTAL CA: CPT

## 2017-05-15 PROCEDURE — 87077 CULTURE AEROBIC IDENTIFY: CPT

## 2017-05-15 PROCEDURE — 96365 THER/PROPH/DIAG IV INF INIT: CPT

## 2017-05-15 PROCEDURE — 87086 URINE CULTURE/COLONY COUNT: CPT

## 2017-05-15 PROCEDURE — 96375 TX/PRO/DX INJ NEW DRUG ADDON: CPT

## 2017-05-15 PROCEDURE — 85025 COMPLETE CBC W/AUTO DIFF WBC: CPT

## 2017-05-15 PROCEDURE — 25000003 PHARM REV CODE 250: Performed by: EMERGENCY MEDICINE

## 2017-05-15 PROCEDURE — 99284 EMERGENCY DEPT VISIT MOD MDM: CPT | Mod: 25

## 2017-05-15 PROCEDURE — 63600175 PHARM REV CODE 636 W HCPCS: Performed by: EMERGENCY MEDICINE

## 2017-05-15 PROCEDURE — 96376 TX/PRO/DX INJ SAME DRUG ADON: CPT

## 2017-05-15 PROCEDURE — 87088 URINE BACTERIA CULTURE: CPT

## 2017-05-15 RX ORDER — TRAMADOL HYDROCHLORIDE 50 MG/1
50 TABLET ORAL EVERY 6 HOURS PRN
Qty: 20 TABLET | Refills: 0 | Status: SHIPPED | OUTPATIENT
Start: 2017-05-15 | End: 2017-05-25

## 2017-05-15 RX ORDER — SYRING-NEEDL,DISP,INSUL,0.3 ML 29 G X1/2"
296 SYRINGE, EMPTY DISPOSABLE MISCELLANEOUS ONCE
Status: COMPLETED | OUTPATIENT
Start: 2017-05-15 | End: 2017-05-15

## 2017-05-15 RX ORDER — HALOPERIDOL 5 MG/ML
5 INJECTION INTRAMUSCULAR
Status: COMPLETED | OUTPATIENT
Start: 2017-05-15 | End: 2017-05-15

## 2017-05-15 RX ORDER — SULFAMETHOXAZOLE AND TRIMETHOPRIM 800; 160 MG/1; MG/1
1 TABLET ORAL 2 TIMES DAILY
Qty: 14 TABLET | Refills: 0 | Status: SHIPPED | OUTPATIENT
Start: 2017-05-15 | End: 2017-05-22

## 2017-05-15 RX ORDER — HYDROMORPHONE HYDROCHLORIDE 1 MG/ML
0.5 INJECTION, SOLUTION INTRAMUSCULAR; INTRAVENOUS; SUBCUTANEOUS
Status: COMPLETED | OUTPATIENT
Start: 2017-05-15 | End: 2017-05-15

## 2017-05-15 RX ORDER — PROMETHAZINE HYDROCHLORIDE 25 MG/1
25 SUPPOSITORY RECTAL EVERY 6 HOURS PRN
Qty: 10 SUPPOSITORY | Refills: 0 | Status: SHIPPED | OUTPATIENT
Start: 2017-05-15 | End: 2017-08-10 | Stop reason: ALTCHOICE

## 2017-05-15 RX ORDER — CEFTRIAXONE 1 G/50ML
1 INJECTION, SOLUTION INTRAVENOUS
Status: COMPLETED | OUTPATIENT
Start: 2017-05-15 | End: 2017-05-15

## 2017-05-15 RX ORDER — HYDROMORPHONE HYDROCHLORIDE 2 MG/ML
1 INJECTION, SOLUTION INTRAMUSCULAR; INTRAVENOUS; SUBCUTANEOUS
Status: COMPLETED | OUTPATIENT
Start: 2017-05-15 | End: 2017-05-15

## 2017-05-15 RX ADMIN — HYDROMORPHONE HYDROCHLORIDE 1 MG: 2 INJECTION, SOLUTION INTRAMUSCULAR; INTRAVENOUS; SUBCUTANEOUS at 03:05

## 2017-05-15 RX ADMIN — MAGNESIUM CITRATE 296 ML: 1.75 LIQUID ORAL at 04:05

## 2017-05-15 RX ADMIN — HALOPERIDOL LACTATE 5 MG: 5 INJECTION, SOLUTION INTRAMUSCULAR at 03:05

## 2017-05-15 RX ADMIN — CEFTRIAXONE 1 G: 1 INJECTION, SOLUTION INTRAVENOUS at 04:05

## 2017-05-15 RX ADMIN — HYDROMORPHONE HYDROCHLORIDE 0.5 MG: 1 INJECTION, SOLUTION INTRAMUSCULAR; INTRAVENOUS; SUBCUTANEOUS at 04:05

## 2017-05-15 NOTE — ED NOTES
Presents to the ER with c/o right flank pain that started yesterday. Patient reports that pain was mild yesterday, she thought she was getting a UTI and started AZO. Patient arrived to room 5, guarding her right abdomen and crying, patient is unable to keep still in the bed. Patient reports that pain started to radiated to her right abd just prior to arrival. Mucous membranes are pink and moist. Skin is warm, dry and intact. Lungs are clear bilaterally, respirations are regular and unlabored. Denies cough, congestion, rhinorrhea or SOB. BS active x4, no tenderness with palpation, abd is soft and not distended. Denies any appetite or activity change. S1S2, capillary refill is < 2 seconds. Denies dysuria, difficulty urinating, frequency, numbness, tingling or weakness. KANG SALMON.

## 2017-05-15 NOTE — ED NOTES
Upon discharge, patient is AAOx4, no cardiac or respiratory complications. Follow up care and  Medications have been reviewed with patient and has been instructed to return to the ER if needed. Patient verbalized understanding and ambulated to the lobby without difficulty. LUIS ANTONIO KAPADIA.

## 2017-05-15 NOTE — ED PROVIDER NOTES
Encounter Date: 5/15/2017    SCRIBE #1 NOTE: I, Crystal Rubi, am scribing for, and in the presence of, Dr. Molina.       History     Chief Complaint   Patient presents with    Flank Pain     right side. hx of kidney stones.      Review of patient's allergies indicates:   Allergen Reactions    Stadol [butorphanol tartrate] Anaphylaxis    Demerol [meperidine]      vomiting    Levaquin [levofloxacin] Hives    Morphine      vomiting      Reglan [metoclopramide] Hives    Toradol [ketorolac] Other (See Comments)     Told not to take due to kidneys    Zofran [ondansetron hcl (pf)]     Iodine Rash     HPI Comments:   05/15/2017 2:57 PM     Chief Complaint: Flank pain      The patient is a 42 y.o. female who presents to the ED with a sudden onset of severe right flank pain with associated nausea and vomiting. She reports prior similar symptoms to right sided kidney stones. The patient endorsed left back pain yesterday that she thought was the onset of a UTI and started taking AZO pills. She denies fever or any other symptoms at this time. The patient has a PMHx of renal disorder and kidney stones. She has a SHx including right uretal surgery.     The history is provided by the patient.     Past Medical History:   Diagnosis Date    Kidney stone     Renal disorder kidney stones     Past Surgical History:   Procedure Laterality Date    APPENDECTOMY       SECTION      CHOLECYSTECTOMY      HERNIA REPAIR Left 2017    HYSTERECTOMY      kidney stent      SHOULDER ARTHROSCOPY Right     TONSILLECTOMY      URETER SURGERY       History reviewed. No pertinent family history.  Social History   Substance Use Topics    Smoking status: Current Every Day Smoker     Packs/day: 1.00    Smokeless tobacco: None    Alcohol use Yes      Comment: occ     Review of Systems   Constitutional: Negative for chills and fever.   HENT: Negative for nosebleeds.    Eyes: Negative for visual disturbance.   Respiratory:  Negative for cough and shortness of breath.    Cardiovascular: Negative for chest pain and palpitations.   Gastrointestinal: Positive for nausea and vomiting. Negative for abdominal pain and diarrhea.   Genitourinary: Positive for flank pain. Negative for dysuria and hematuria.   Musculoskeletal: Positive for back pain. Negative for neck pain.   Skin: Negative for rash.   Neurological: Negative for seizures, syncope and headaches.     Physical Exam   Initial Vitals   BP Pulse Resp Temp SpO2   -- -- 05/15/17 1443 05/15/17 1443 05/15/17 1443     20 97.8 °F (36.6 °C) 100 %     Physical Exam    Nursing note and vitals reviewed.  Constitutional: She appears well-developed and well-nourished.   HENT:   Head: Normocephalic and atraumatic.   Eyes: Conjunctivae are normal.   Neck: Normal range of motion. Neck supple.   Cardiovascular: Normal rate, regular rhythm and normal heart sounds. Exam reveals no gallop and no friction rub.    No murmur heard.  Pulmonary/Chest: Effort normal and breath sounds normal. No respiratory distress. She has no wheezes. She has no rhonchi.   Abdominal: Soft. She exhibits no distension. There is tenderness. There is CVA tenderness.   Right CVA tenderness. Mild right sided abdominal pain.    Musculoskeletal: Normal range of motion.   Neurological: She is alert and oriented to person, place, and time.   Skin: Skin is warm and dry. No erythema.   Psychiatric: She has a normal mood and affect.       ED Course   Procedures  Labs Reviewed   URINALYSIS - Abnormal; Notable for the following:        Result Value    Color, UA Orange (*)     All other components within normal limits   CBC W/ AUTO DIFFERENTIAL - Abnormal; Notable for the following:     WBC 12.90 (*)      (*)     MCH 34.1 (*)     MPV 7.8 (*)     Gran # 10.1 (*)     Gran% 78.1 (*)     Mono% 2.2 (*)     All other components within normal limits   URINALYSIS MICROSCOPIC - Abnormal; Notable for the following:     WBC, UA 30 (*)      Bacteria, UA Moderate (*)     All other components within normal limits   BASIC METABOLIC PANEL     Imaging Results         X-Ray Abdomen AP 1 View (Final result) Result time:  05/15/17 15:35:19    Final result by Denzel Lambert MD (05/15/17 15:35:19)    Impression:      1.  No acute radiographic findings to explain this patient's symptoms.       Electronically signed by: Denzel Lambert MD  Date:     05/15/17  Time:    15:35     Narrative:    Comparison: 2/18/17 and 11/25/16    Technique: 2, AP abdominal radiographs.    Findings: No dilated bowel loops are noted. No abdominal mass effect noted. There is a subtle increased density projecting over the right hemipelvis just medial to the sacrum which may be postsurgical and was present on prior radiographs. No radiographically apparent abnormal calcification or nephroureterolithiasis. This is thought to be more medial than the expected location of the ureter. Cholecystectomy clips are noted. No focal osseous abnormality is seen. No organomegaly is identified.                  Medical Decision Making:   History:   Old Medical Records: I decided to obtain old medical records.  Old Records Summarized: records from previous admission(s).  Independently Interpreted Test(s):   I have ordered and independently interpreted X-rays - see summary below.       <> Summary of X-Ray Reading(s): KUB independently interpreted by me demonstrates no radiopaque stones.  Clinical Tests:   Lab Tests: Reviewed and Ordered  Radiological Study: Reviewed and Ordered  ED Management:  Natividad Acosta is a 42 y.o. female who presents with  severe abrupt onset of right flank pain.  She is found to have significant pyuria and bacteriuria consistent with pyelonephritis.  KUB fails to demonstrate a radiopaque stone with no hydronephrosis making ureterolithiasis unlikely.  She will be treated with oral Cipro after receiving intravenous ceftriaxone in the emergency department.  Urine is  submitted for culture.  She is given Ultram for pain.            Scribe Attestation:   Scribe #1: I performed the above scribed service and the documentation accurately describes the services I performed. I attest to the accuracy of the note.    Attending Attestation:           Physician Attestation for Scribe:  Physician Attestation Statement for Scribe #1: I, Dr. Molina, reviewed documentation, as scribed by Crystal Rubi in my presence, and it is both accurate and complete.                 ED Course     Clinical Impression:     1. Right flank pain                Hira Molina III, MD  05/15/17 2810

## 2017-05-15 NOTE — ED AVS SNAPSHOT
[facility] [address]  [place] [address]               Natividad Acosta   5/15/2017  2:46 PM   ED    Description:  Female : 1975   Department:  [facility]           Your Care was Coordinated By:     Provider Role From To    Hira Molina III, MD Attending Provider 05/15/17 3323 --      Reason for Visit     Flank Pain           Diagnoses this Visit        Comments    Pyelonephritis    -  Primary     Right flank pain           ED Disposition     None           To Do List           Follow-up Information     Follow up with Sabine Cain MD In 2 days.    Specialty:  Urology    Contact information:    [address] [facility]  [address]  [place] [address]  [phone]         These Medications        Disp Refills Start End    tramadol (ULTRAM) 50 mg tablet 20 tablet 0 5/15/2017 2017    Take 1 tablet (50 mg total) by mouth every 6 (six) hours as needed for Pain. - Oral    sulfamethoxazole-trimethoprim 800-160mg (BACTRIM DS) 800-160 mg Tab 14 tablet 0 5/15/2017 2017    Take 1 tablet by mouth 2 (two) times daily. - Oral    promethazine (PHENERGAN) 25 MG suppository 10 suppository 0 5/15/2017     Place 1 suppository (25 mg total) rectally every 6 (six) hours as needed for Nausea. - Rectal      [facility]     [facility] Care [facility] - 24/7 Assistance  Unless otherwise directed by your provider, please contact [facility], our nurse care line that is available for 24/7 assistance.     Registered nurses in the [facility] provide: appointment scheduling, clinical advisement, health education, and other advisory services.  Call: [phone] (toll free)               Medications           Message regarding Medications     Verify the changes and/or additions to your medication regime listed below are the same as discussed with your clinician today.  If any of these changes or additions are incorrect,  please notify your healthcare provider.        START taking these NEW medications        Refills    tramadol (ULTRAM) 50 mg tablet 0    Sig: Take 1 tablet (50 mg total) by mouth every 6 (six) hours as needed for Pain.    Class: Print    Route: Oral    sulfamethoxazole-trimethoprim 800-160mg (BACTRIM DS) 800-160 mg Tab 0    Sig: Take 1 tablet by mouth 2 (two) times daily.    Class: Print    Route: Oral    promethazine (PHENERGAN) 25 MG suppository 0    Sig: Place 1 suppository (25 mg total) rectally every 6 (six) hours as needed for Nausea.    Class: Print    Route: Rectal      These medications were administered today        Dose Freq    HYDROmorphone injection 1 mg 1 mg ED 1 Time    Sig: Inject 0.5 mLs (1 mg total) into the vein ED 1 Time.    Class: Normal    Route: Intravenous    haloperidol lactate injection 5 mg 5 mg ED 1 Time    Sig: Inject 1 mL (5 mg total) into the vein ED 1 Time.    Class: Normal    Route: Intravenous    HYDROmorphone injection 0.5 mg 0.5 mg ED 1 Time    Sig: Inject 0.5 mLs (0.5 mg total) into the vein ED 1 Time.    Class: Normal    Route: Intravenous    magnesium citrate solution 296 mL 296 mL Once    Sig: Take 296 mLs by mouth once.    Class: Normal    Route: Oral    cefTRIAXone 1 gram/50 mL IVPB 1 g 1 g ED 1 Time    Sig: Inject 50 mLs (1 g total) into the vein ED 1 Time.    Class: Normal    Route: Intravenous      STOP taking these medications     oxycodone-acetaminophen (PERCOCET)  mg per tablet Take 1 tablet by mouth every 4 (four) hours as needed for Pain.           Verify that the below list of medications is an accurate representation of the medications you are currently taking.  If none reported, the list may be blank. If incorrect, please contact your healthcare provider. Carry this list with you in case of emergency.           Current Medications     multivitamin (ONE DAILY MULTIVITAMIN) per tablet Take 1 tablet by mouth once daily.    phenylephrine (SUDAFED PE) 10 MG Tab  Take 10 mg by mouth every 4 (four) hours as needed.    promethazine (PHENERGAN) 25 MG suppository Place 1 suppository (25 mg total) rectally every 6 (six) hours as needed for Nausea.    promethazine (PHENERGAN) 25 MG suppository Place 1 suppository (25 mg total) rectally every 6 (six) hours as needed for Nausea.    sulfamethoxazole-trimethoprim 800-160mg (BACTRIM DS) 800-160 mg Tab Take 1 tablet by mouth 2 (two) times daily.    tramadol (ULTRAM) 50 mg tablet Take 1 tablet (50 mg total) by mouth every 6 (six) hours as needed for Pain.           Clinical Reference Information           Your Vitals Were     BP Pulse Temp Resp Height Weight    152/69 109 97.8 °F (36.6 °C) (Oral) 20 5' (1.524 m) 52.2 kg (115 lb)    SpO2 BMI             95% 22.46 kg/m2         Allergies as of 5/15/2017        Reactions    Stadol [Butorphanol Tartrate] Anaphylaxis    Demerol [Meperidine]     vomiting    Levaquin [Levofloxacin] Hives    Morphine     vomiting    Reglan [Metoclopramide] Hives    Toradol [Ketorolac] Other (See Comments)    Told not to take due to kidneys    Zofran [Ondansetron Hcl (Pf)]     Iodine Rash      Immunizations Administered on Date of Encounter - 5/15/2017     None      ED Micro, Lab, POCT     Start Ordered       Status Ordering Provider    05/15/17 1728 05/15/17 1727  Urine culture  STAT      Acknowledged     05/15/17 1500 05/15/17 1500  Urinalysis  STAT      Final result     05/15/17 1500 05/15/17 1500  Basic metabolic panel  STAT      Final result     05/15/17 1500 05/15/17 1500  CBC auto differential  STAT      Final result     05/15/17 1500 05/15/17 1500    Once,   Status:  Canceled      Canceled     05/15/17 1500 05/15/17 1500  Urinalysis Microscopic  Once      Final result       ED Imaging Orders     Start Ordered       Status Ordering Provider    05/15/17 1647 05/15/17 1605  US Retroperitoneal Complete  1 time imaging      Final result     05/15/17 1606 05/15/17 1605    1 time imaging,   Status:  Canceled       Canceled     05/15/17 1500 05/15/17 1500  X-Ray Abdomen AP 1 View  1 time imaging      Final result         Discharge Instructions         Abdominal Pain    Abdominal pain is pain in the stomach or belly area. Everyone has this pain from time to time. In many cases it goes away on its own. But abdominal pain can sometimes be due to a serious problem, such as appendicitis. So its important to know when to seek help.  Causes of abdominal pain  There are many possible causes of abdominal pain. Common causes in adults include:  · Constipation, diarrhea, or gas  · Stomach acid flowing back up into the esophagus (acid reflux or heartburn)  · Severe acid reflux, called GERD (gastroesophageal reflux disease)  · A sore in the lining of the stomach or small intestine (peptic ulcer)  · Inflammation of the gallbladder, liver, or pancreas  · Gallstones or kidney stones  · Appendicitis   · Intestinal blockage   · An internal organ pushing through a muscle or other tissue (hernia)  · Urinary tract infections  · In women, menstrual cramps, fibroids, or endometriosis  · Inflammation or infection of the intestines  Diagnosing the cause of abdominal pain  Your healthcare provider will do a physical exam help find the cause of your pain. If needed, tests will be ordered. Belly pain has many possible causes. So it can be hard to find the reason for your pain. Giving details about your pain can help. Tell your provider where and when you feel the pain, and what makes it better or worse. Also let your provider know if you have other symptoms such as:  · Fever  · Tiredness  · Upset stomach (nausea)  · Vomiting  · Changes in bathroom habits  Treating abdominal pain  Some causes of pain need emergency medical treatment right away. These include appendicitis or a bowel blockage. Other problems can be treated with rest, fluids, or medicines. Your healthcare provider can give you specific instructions for treatment or self-care based on  what is causing your pain.  If you have vomiting or diarrhea, sip water or other clear fluids. When you are ready to eat solid foods again, start with small amounts of easy-to-digest, low-fat foods. These include apple sauce, toast, or crackers.   When to seek medical care  Call 911 or go to the hospital right away if you:  · Cant pass stool and are vomiting  · Are vomiting blood or have bloody diarrhea or black, tarry diarrhea  · Have chest, neck, or shoulder pain  · Feel like you might pass out  · Have pain in your shoulder blades with nausea  · Have sudden, severe belly pain  · Have new, severe pain unlike any you have felt before  · Have a belly that is rigid, hard, and tender to touch  Call your healthcare provider if you have:  · Pain for more than 5 days  · Bloating for more than 2 days  · Diarrhea for more than 5 days  · A fever of 100.4°F (38.0°C) or higher, or as directed by your provider  · Pain that gets worse  · Weight loss for no reason  · Continued lack of appetite  · Blood in your stool  How to prevent abdominal pain  Here are some tips to help prevent abdominal pain:  · Eat smaller amounts of food at one time.  · Avoid greasy, fried, or other high-fat foods.  · Avoid foods that give you gas.  · Exercise regularly.  · Drink plenty of fluids.  To help prevent GERD symptoms:  · Quit smoking.  · Reduce alcohol and certain foods that increase stomach acid.  · Avoid aspirin and over-the-counter pain and fever medicines (NSAIDS or nonsteroidal anti-inflammatory drugs), if possible  · Lose extra weight.  · Finish eating at least 2 hours before you go to bed or lie down.  · Raise the head of your bed.  Date Last Reviewed: 7/1/2016  © 1580-5097 [employer]. [address] [place] [address], [place] [address]. All rights reserved. This information is not intended as a substitute for professional medical care. Always follow your healthcare professional's instructions.          Discharge  References/Attachments     SHASHI, FEMALE (ADULT) (ENGLISH)      Smoking Cessation     If you would like to quit smoking:   You may be eligible for free services if you are a [place] resident and started smoking cigarettes before September 1, 1988.  Call the Smoking Cessation Trust (SCT) toll free at (962) 082-3296 or (678) 018-2609.   Call 1-800-QUIT-NOW if you do not meet the above criteria.   Contact us via email: [email or web address]   View our website for more information: [email or web address]         [facility] complies with applicable Federal civil rights laws and does not discriminate on the basis of race, color, national origin, age, disability, or sex.        Language Assistance Services     ATTENTION: Language assistance services are available, free of charge. Please call [phone].      ATENCIÓN: Si habla español, tiene a betancourt disposición servicios gratuitos de asistencia lingüística. Llame al [phone].     CHÚ Ý: N?u b?n nói Ti?ng Vi?t, có các d?ch v? h? tr? ngôn ng? mi?n phí dành cho b?n. G?i s? [phone].

## 2017-05-15 NOTE — DISCHARGE INSTRUCTIONS
Abdominal Pain    Abdominal pain is pain in the stomach or belly area. Everyone has this pain from time to time. In many cases it goes away on its own. But abdominal pain can sometimes be due to a serious problem, such as appendicitis. So its important to know when to seek help.  Causes of abdominal pain  There are many possible causes of abdominal pain. Common causes in adults include:  · Constipation, diarrhea, or gas  · Stomach acid flowing back up into the esophagus (acid reflux or heartburn)  · Severe acid reflux, called GERD (gastroesophageal reflux disease)  · A sore in the lining of the stomach or small intestine (peptic ulcer)  · Inflammation of the gallbladder, liver, or pancreas  · Gallstones or kidney stones  · Appendicitis   · Intestinal blockage   · An internal organ pushing through a muscle or other tissue (hernia)  · Urinary tract infections  · In women, menstrual cramps, fibroids, or endometriosis  · Inflammation or infection of the intestines  Diagnosing the cause of abdominal pain  Your healthcare provider will do a physical exam help find the cause of your pain. If needed, tests will be ordered. Belly pain has many possible causes. So it can be hard to find the reason for your pain. Giving details about your pain can help. Tell your provider where and when you feel the pain, and what makes it better or worse. Also let your provider know if you have other symptoms such as:  · Fever  · Tiredness  · Upset stomach (nausea)  · Vomiting  · Changes in bathroom habits  Treating abdominal pain  Some causes of pain need emergency medical treatment right away. These include appendicitis or a bowel blockage. Other problems can be treated with rest, fluids, or medicines. Your healthcare provider can give you specific instructions for treatment or self-care based on what is causing your pain.  If you have vomiting or diarrhea, sip water or other clear fluids. When you are ready to eat solid foods again,  start with small amounts of easy-to-digest, low-fat foods. These include apple sauce, toast, or crackers.   When to seek medical care  Call 911 or go to the hospital right away if you:  · Cant pass stool and are vomiting  · Are vomiting blood or have bloody diarrhea or black, tarry diarrhea  · Have chest, neck, or shoulder pain  · Feel like you might pass out  · Have pain in your shoulder blades with nausea  · Have sudden, severe belly pain  · Have new, severe pain unlike any you have felt before  · Have a belly that is rigid, hard, and tender to touch  Call your healthcare provider if you have:  · Pain for more than 5 days  · Bloating for more than 2 days  · Diarrhea for more than 5 days  · A fever of 100.4°F (38.0°C) or higher, or as directed by your provider  · Pain that gets worse  · Weight loss for no reason  · Continued lack of appetite  · Blood in your stool  How to prevent abdominal pain  Here are some tips to help prevent abdominal pain:  · Eat smaller amounts of food at one time.  · Avoid greasy, fried, or other high-fat foods.  · Avoid foods that give you gas.  · Exercise regularly.  · Drink plenty of fluids.  To help prevent GERD symptoms:  · Quit smoking.  · Reduce alcohol and certain foods that increase stomach acid.  · Avoid aspirin and over-the-counter pain and fever medicines (NSAIDS or nonsteroidal anti-inflammatory drugs), if possible  · Lose extra weight.  · Finish eating at least 2 hours before you go to bed or lie down.  · Raise the head of your bed.  Date Last Reviewed: 7/1/2016  © 1754-6999 [employer]. [address] [place] [address], [place] [address]. All rights reserved. This information is not intended as a substitute for professional medical care. Always follow your healthcare professional's instructions.

## 2017-05-18 LAB — BACTERIA UR CULT: NORMAL

## 2017-08-08 ENCOUNTER — TELEPHONE (OUTPATIENT)
Dept: UROLOGY | Facility: CLINIC | Age: 42
End: 2017-08-08

## 2017-08-08 ENCOUNTER — OFFICE VISIT (OUTPATIENT)
Dept: UROLOGY | Facility: CLINIC | Age: 42
End: 2017-08-08
Payer: MEDICAID

## 2017-08-08 VITALS
BODY MASS INDEX: 23.77 KG/M2 | WEIGHT: 125.88 LBS | DIASTOLIC BLOOD PRESSURE: 77 MMHG | HEIGHT: 61 IN | HEART RATE: 120 BPM | TEMPERATURE: 98 F | SYSTOLIC BLOOD PRESSURE: 125 MMHG

## 2017-08-08 DIAGNOSIS — N20.0 RECURRENT KIDNEY STONES: ICD-10-CM

## 2017-08-08 DIAGNOSIS — N13.5 URETERAL STRICTURE, RIGHT: ICD-10-CM

## 2017-08-08 DIAGNOSIS — N13.1 HYDRONEPHROSIS WITH URETERAL STRICTURE, NOT ELSEWHERE CLASSIFIED: ICD-10-CM

## 2017-08-08 DIAGNOSIS — R10.9 ACUTE RIGHT FLANK PAIN: Primary | ICD-10-CM

## 2017-08-08 LAB
BILIRUB SERPL-MCNC: NEGATIVE MG/DL
BLOOD URINE, POC: ABNORMAL
COLOR, POC UA: YELLOW
GLUCOSE UR QL STRIP: NORMAL
KETONES UR QL STRIP: NEGATIVE
LEUKOCYTE ESTERASE URINE, POC: NEGATIVE
NITRITE, POC UA: NEGATIVE
PH, POC UA: 6
PROTEIN, POC: NEGATIVE
SPECIFIC GRAVITY, POC UA: 1
UROBILINOGEN, POC UA: NORMAL

## 2017-08-08 PROCEDURE — 87086 URINE CULTURE/COLONY COUNT: CPT

## 2017-08-08 PROCEDURE — 99215 OFFICE O/P EST HI 40 MIN: CPT | Mod: PBBFAC,PO | Performed by: NURSE PRACTITIONER

## 2017-08-08 PROCEDURE — 99999 PR PBB SHADOW E&M-EST. PATIENT-LVL V: CPT | Mod: PBBFAC,,, | Performed by: NURSE PRACTITIONER

## 2017-08-08 PROCEDURE — 81001 URINALYSIS AUTO W/SCOPE: CPT | Mod: PBBFAC,PO | Performed by: NURSE PRACTITIONER

## 2017-08-08 PROCEDURE — 87088 URINE BACTERIA CULTURE: CPT

## 2017-08-08 PROCEDURE — 3008F BODY MASS INDEX DOCD: CPT | Mod: ,,, | Performed by: NURSE PRACTITIONER

## 2017-08-08 PROCEDURE — 99204 OFFICE O/P NEW MOD 45 MIN: CPT | Mod: S$PBB,,, | Performed by: NURSE PRACTITIONER

## 2017-08-08 RX ORDER — TAMSULOSIN HYDROCHLORIDE 0.4 MG/1
0.4 CAPSULE ORAL DAILY
Qty: 30 CAPSULE | Refills: 3 | Status: SHIPPED | OUTPATIENT
Start: 2017-08-08 | End: 2018-03-07

## 2017-08-08 RX ORDER — DIPHENHYDRAMINE HCL 25 MG
50 CAPSULE ORAL ONCE
Qty: 1 CAPSULE | Refills: 0 | Status: SHIPPED | OUTPATIENT
Start: 2017-08-08 | End: 2017-08-08

## 2017-08-08 RX ORDER — CIPROFLOXACIN 500 MG/1
500 TABLET ORAL 2 TIMES DAILY
Qty: 28 TABLET | Refills: 0 | Status: SHIPPED | OUTPATIENT
Start: 2017-08-08 | End: 2017-08-22

## 2017-08-08 RX ORDER — OXYCODONE AND ACETAMINOPHEN 5; 325 MG/1; MG/1
1 TABLET ORAL EVERY 4 HOURS PRN
Qty: 30 TABLET | Refills: 0 | Status: SHIPPED | OUTPATIENT
Start: 2017-08-08 | End: 2017-08-11 | Stop reason: ALTCHOICE

## 2017-08-08 RX ORDER — PREDNISONE 50 MG/1
50 TABLET ORAL DAILY
Qty: 3 TABLET | Refills: 0 | Status: SHIPPED | OUTPATIENT
Start: 2017-08-08 | End: 2017-08-11

## 2017-08-08 NOTE — PROGRESS NOTES
"[facility] Note  Staff: MAIA Saleem      PCP: None on File    Chief Complaint: Right flank pain, hx of kidney stones and right ureter reconstruction.    Subjective:        HPI: Natividad Acosta is a 42 y.o. female new patient to Urology clinic who presented on 08/04/2017 to [facility] ED with c/o intermittent right flank pain.  The patient has a history of chronic kidney stones requiring stenting in the past as well as reconstruction of the right ureter.    BUN/Cr done on 08/04/2017:  8/0.68    CT of abdomen/pelvis WITHOUT contrast done during ED visit also showed the following:  IMPRESSION:  1.  Mild asymptomatic appearance of the collecting structures and ureter on the right compared to the left with subtle suggestion of uroepithelial thickening along the right ureter without evidence for ureteral calculus which may represent sequelae of a recently passed calculus, however alternatively correlation for UTI/pyelonephritis is needed.    Pt was discharged from ED with the following RXs:  -Percocet 5-325 mg prn pain  -Promethazine 25 mg prn nausea/vomiting.    *Pt did see Dr. Morena Pereira in 2007 for Urology consult after she had an emergency Cystoscopy, ureteroscopy, laser lithotripsy, and stone removal by Dr. Rishabh Ascencio while out of town at [facility] in [place] 2 weeks prior to seeing Dr. Pereira on 07/09/2007.  This was due to a 6mm right ureteral stone with obstruction.    On 07/09/2007 Dr. Pereira did the Cystoscopy with stent removal.  Then a few weeks later pt was found to have ureteral edema and a stent was placed within right ureter.  On 07/27/2007 Dr. Pereira did another cystoscopy with stent removal.    After this the patient then went to see Dr. See at [facility] in [place] and had the right ureteral stricture incised and followed up with him.  From 2011 until present day pt has been "back and forth" seeing Dr. See and Dr. Manzanares in [place] for numerous " stent placements as well as having at least two nephrostomy tube placements since reconstruction surgery.    REVIEW OF SYSTEMS:  Review of Systems   Constitutional: Negative for chills, diaphoresis, fever and weight loss.   HENT: Negative for congestion, hearing loss, nosebleeds and sore throat.    Eyes: Negative for blurred vision and pain.   Respiratory: Negative for cough and wheezing.    Cardiovascular: Negative for chest pain, palpitations and leg swelling.   Gastrointestinal: Negative for abdominal pain, heartburn, nausea and vomiting.   Genitourinary: Positive for flank pain (Right sided flank pain). Negative for dysuria, frequency, hematuria and urgency.   Musculoskeletal: Negative for back pain, joint pain, myalgias and neck pain.   Skin: Negative for itching and rash.   Neurological: Negative for dizziness, tremors, sensory change, seizures, loss of consciousness, weakness and headaches.   Endo/Heme/Allergies: Does not bruise/bleed easily.   Psychiatric/Behavioral: Negative for depression and suicidal ideas. The patient is not nervous/anxious.      PMHx:  Past Medical History:   Diagnosis Date    Kidney stone     Renal disorder kidney stones     PSHx:  Past Surgical History:   Procedure Laterality Date    APPENDECTOMY       SECTION      CHOLECYSTECTOMY      HERNIA REPAIR Left 2017    HYSTERECTOMY      kidney stent      SHOULDER ARTHROSCOPY Right     TONSILLECTOMY      URETER SURGERY       Social History     Social History    Marital status: Single     Spouse name: N/A    Number of children: N/A    Years of education: N/A     Social History Main Topics    Smoking status: Current Every Day Smoker     Packs/day: 1.00    Smokeless tobacco: None    Alcohol use Yes      Comment: occ    Drug use: No    Sexual activity: Not Asked     Other Topics Concern    None     Social History Narrative    None     Allergies:  Stadol [butorphanol tartrate]; Demerol [meperidine]; Levaquin  [levofloxacin]; Morphine; Reglan [metoclopramide]; Toradol [ketorolac]; Zofran [ondansetron hcl (pf)]; and Iodine    Medications: reviewed   Anticoagulation: No    Objective:     Vitals:    08/08/17 1411   BP: 125/77   Pulse: (!) 120   Temp: 98.4 °F (36.9 °C)     Physical Exam   Constitutional: She is oriented to person, place, and time. She appears well-developed and well-nourished.   HENT:   Head: Normocephalic and atraumatic.   Eyes: Conjunctivae and EOM are normal. Pupils are equal, round, and reactive to light.   Neck: Normal range of motion. Neck supple.   Cardiovascular: Normal rate, regular rhythm, normal heart sounds and intact distal pulses.    Pulmonary/Chest: Effort normal and breath sounds normal.   Abdominal: Soft. Bowel sounds are normal.   Musculoskeletal: Normal range of motion.   Neurological: She is alert and oriented to person, place, and time. She has normal reflexes.   Skin: Skin is warm and dry.     Psychiatric: She has a normal mood and affect. Her behavior is normal. Judgment and thought content normal.     LABS REVIEW:  UA today: Color:Clear, Yellow  Spec. Grav.  1.005  PH  6.0  250 of Blood    Cr:   Lab Results   Component Value Date    CREATININE 0.9 05/15/2017     History of Radiology Reports from [facility]:  Renal US done 05/15/2017:  Negative bilateral renal us    XR Abdomen AP 1 View done on 05/15/17:  IMPRESSION:  No acute findings    CT RSS performed on 02/18/17:  IMPRESSION:  Continued mild right-sided hydronephrosis.  This was first seen in November 2016.  Consider urologic consultation.  Prior cholecystectomy.  Prior hysterectomy.  The rest of the CT the abdomen and pelvis is negative    Assessment:       1. Acute right flank pain    2. Recurrent kidney stones    3. Hydronephrosis with ureteral stricture, not elsewhere classified    4. Ureteral stricture, right          Plan:     1.  Obtain records from Dr. Charbel See-Urologist at [facility] who did the right ureter  reconstruction surgery somewhere around [phone].    2.  Obtain records from Dr. Patel's office in [place]-Urologist who pt also went to see for nephrostomy tube placement and stent placements.    3.  Cipro 500 mg 1 po BID x 14 days prescribed to the patient.       Tamsulosin 0.4 mg 1 po daily prescribed to the patient.       Percocet 5-325 mg refilled for the patient.    4.  CT Urogram to be performed for further evaluation of right ureteral stricture.  CT Allergy protocol medication prescribed to the pt due to her allergy to iodine.    5.  We will send urine for culture testing.    F/u with Urologist as soon as CT Urogram is performed and after we receive records from her previous Urologists.    [facility]: Alfonso Hatfield, PETERSONP-C

## 2017-08-08 NOTE — TELEPHONE ENCOUNTER
"Spoke with patient and advised that it is ok for patient to come in for appointment today at 2pm. Patient said "Thank you so much. That'll be perfect."  "

## 2017-08-08 NOTE — TELEPHONE ENCOUNTER
----- Message from Jayda Thompson sent at 8/8/2017 10:14 AM CDT -----  Contact: patient  Patient calling to reschedule appt today for a later time today due to work. No avail appt.  Please advise.  Call back   Thanks!

## 2017-08-08 NOTE — TELEPHONE ENCOUNTER
"Patient states that she was advised today that she may have to work until 1:30pm and is asking if her appointment can be pushed to 1:45-2pm today. If not, patient will try to manipulate work schedule to be here for 1pm. "I need to get in today because I'm really hurting."  "

## 2017-08-09 ENCOUNTER — HOSPITAL ENCOUNTER (OUTPATIENT)
Dept: RADIOLOGY | Facility: HOSPITAL | Age: 42
Discharge: HOME | End: 2017-08-09
Attending: NURSE PRACTITIONER
Payer: MEDICAID

## 2017-08-09 DIAGNOSIS — R10.9 ACUTE RIGHT FLANK PAIN: ICD-10-CM

## 2017-08-09 PROCEDURE — 25500020 PHARM REV CODE 255

## 2017-08-09 PROCEDURE — 74178 CT ABD&PLV WO CNTR FLWD CNTR: CPT | Mod: TC

## 2017-08-09 PROCEDURE — 74178 CT ABD&PLV WO CNTR FLWD CNTR: CPT | Mod: 26,,, | Performed by: RADIOLOGY

## 2017-08-09 RX ADMIN — IOHEXOL 75 ML: 350 INJECTION, SOLUTION INTRAVENOUS at 05:08

## 2017-08-10 ENCOUNTER — TELEPHONE (OUTPATIENT)
Dept: UROLOGY | Facility: CLINIC | Age: 42
End: 2017-08-10

## 2017-08-10 LAB — BACTERIA UR CULT: NORMAL

## 2017-08-10 RX ORDER — FLUCONAZOLE 150 MG/1
150 TABLET ORAL DAILY
Qty: 3 TABLET | Refills: 0 | Status: SHIPPED | OUTPATIENT
Start: 2017-08-10 | End: 2017-08-14

## 2017-08-10 RX ORDER — PROMETHAZINE HYDROCHLORIDE 25 MG/1
25 TABLET ORAL EVERY 4 HOURS
Qty: 10 TABLET | Refills: 0 | Status: SHIPPED | OUTPATIENT
Start: 2017-08-10 | End: 2018-03-07

## 2017-08-10 RX ORDER — PROMETHAZINE HYDROCHLORIDE 25 MG/1
25 SUPPOSITORY RECTAL EVERY 6 HOURS PRN
Qty: 10 SUPPOSITORY | Refills: 0 | Status: SHIPPED | OUTPATIENT
Start: 2017-08-10 | End: 2017-08-10 | Stop reason: ALTCHOICE

## 2017-08-10 NOTE — TELEPHONE ENCOUNTER
Spoke with Jenifer Jeffrey, she is going to send an rx from pain, phenergan and diflucan.     Per Jenifer Jeffrey, PA pt needs an appointment next week.     Will call pt with an appointment

## 2017-08-10 NOTE — TELEPHONE ENCOUNTER
Pt called for test results,     Pt is in a lot of pain and the pain medication is not helping, she is currently taking 2 percocet every 6 hours to manage pain, after tomorrow she will be out of medication. Pt wants to know if you can call in the  before the weekend    Pt also needs more phenergan tablets due to the amount of nausea with the pain.     Please advise

## 2017-08-10 NOTE — TELEPHONE ENCOUNTER
Spoke with pt, I am trying to find her a sooner appointment than September with urologist.     Can you please send in Diflucan for pt, since being on antibiotic she now has a yeast infection     Pt really needs pain medication for the weekend, explained to her that you sent in a rx two days ago and can not send in another rx. She stated that by Saturday she will be completely be out of pain meds and needs something for the amount of pain she is in.     Please advise

## 2017-08-10 NOTE — TELEPHONE ENCOUNTER
----- Message from Bri Garvin sent at 8/10/2017  4:47 PM CDT -----  Patient talked to nurse earlier today concerning her pain and nausea medication. She was not sure if office was going to send it in to the pharmacy or was patient supposed to come and pick this up. Please call or remit to:      [facility] [id number] - [place]ICK[place] - [address] AT [facility] [address]  [facility] [place] [address]  Phone: [phone] Fax: [phone]    You can reach patient at [phone].

## 2017-08-10 NOTE — TELEPHONE ENCOUNTER
Returned call to patient, informed her that some meds were sent to her pharmacy and she will need to come to the office to  the RX for the pain med on tomorrow, patient verbally understood.

## 2017-08-10 NOTE — TELEPHONE ENCOUNTER
Zainab Corral LPN is working on getting pt an appointment for next week, The schedule has to be adjusted due to pt's insurance.     Advised pt that we will call her as soon as we get an appointment for her. Pt verbalized understanding

## 2017-08-11 ENCOUNTER — TELEPHONE (OUTPATIENT)
Dept: UROLOGY | Facility: CLINIC | Age: 42
End: 2017-08-11

## 2017-08-11 RX ORDER — OXYCODONE AND ACETAMINOPHEN 10; 325 MG/1; MG/1
1 TABLET ORAL EVERY 6 HOURS PRN
Qty: 30 TABLET | Refills: 0 | Status: SHIPPED | OUTPATIENT
Start: 2017-08-11 | End: 2018-03-07

## 2017-08-11 NOTE — TELEPHONE ENCOUNTER
Called placed to patient to let her know that her  Pain RX is at the  and we have a follow up appt for her to see the MD on Monday, no answer, message left with call back number.

## 2017-08-11 NOTE — TELEPHONE ENCOUNTER
----- Message from JAYDE Sheppard sent at 8/11/2017  8:41 AM CDT -----  Pt is currently taking Cipro at this time-Infection most likely due to rt ureteral stricture.

## 2017-08-14 ENCOUNTER — OFFICE VISIT (OUTPATIENT)
Dept: UROLOGY | Facility: CLINIC | Age: 42
End: 2017-08-14
Payer: MEDICAID

## 2017-08-14 VITALS
HEART RATE: 95 BPM | HEIGHT: 61 IN | DIASTOLIC BLOOD PRESSURE: 77 MMHG | TEMPERATURE: 99 F | SYSTOLIC BLOOD PRESSURE: 122 MMHG | BODY MASS INDEX: 23.6 KG/M2 | WEIGHT: 125 LBS

## 2017-08-14 DIAGNOSIS — N13.5 URETERAL STRICTURE: Primary | ICD-10-CM

## 2017-08-14 PROCEDURE — 3008F BODY MASS INDEX DOCD: CPT | Mod: ,,, | Performed by: UROLOGY

## 2017-08-14 PROCEDURE — 99999 PR PBB SHADOW E&M-EST. PATIENT-LVL III: CPT | Mod: PBBFAC,,, | Performed by: UROLOGY

## 2017-08-14 PROCEDURE — 99213 OFFICE O/P EST LOW 20 MIN: CPT | Mod: PBBFAC,PO | Performed by: UROLOGY

## 2017-08-14 PROCEDURE — 99214 OFFICE O/P EST MOD 30 MIN: CPT | Mod: S$PBB,,, | Performed by: UROLOGY

## 2017-08-14 NOTE — PROGRESS NOTES
Subjective:       Patient ID: Natividad Acosta is a 42 y.o. female.    Chief Complaint:   OFFICE NOTE    CHIEF COMPLAINT:  Abdominal pain, history of right ureteral stricture.    Ms. Acosta is a 42-year-old female who went to the Emergency Room at [facility] on 05/04/2017 with an intermittent right flank pain.  The   patient in the past have kidney stones, on occasions has required the placement   of a stent and also she underwent the reconstruction of the right ureter by Dr. Charbel See at [facility].  Since then, the patient has pain that is   intractable.  She underwent a CT scan of the abdomen that shows a mild   asymptomatic appearance of the collecting structures and ureter with urothelial   thickening along the right ureter without evidence of stricture or significant   hydronephrosis.  At the present time, the patient is taking Percocet and   promethazine.    The past  history in 2007, Dr. Pereira did a cystoscopy, ureteroscopy, laser   lithotripsy and stone removal.  The stone was a 6 mm right ureteral stone.  The   stone was removed later on that year and since then, the patient is having   recurrent pain.  She went to see Dr. See at [facility] in [place] and did a   robotic excision of the stricture area and the patient was treated back and   forth by Dr. See and Dr. Ingram in [place] with numerous stent placement   and two nephrostomy tube placement since the reconstruction surgery.  It is to   be noted that the last CT scan has failed to show significant evidence of   obstruction.  I see no evidence of any triple renal scan to see how much   function is in the right kidney.    PAST MEDICAL AND SURGICAL HISTORY:  Well documented in the medical history and   all this was reviewed including medications and allergies by me during this   visit.    The urinalysis today is within normal limits with the presence of blood.  The   patient denies any fever or  dysuria.      EOR/PN  dd: 08/14/2017 17:24:49 (CDT)  td: 08/15/2017 15:56:57 (CDT)  Doc ID   #[id number]  Job ID #[id number]    CC:       HPI  Review of Systems   Constitutional: Negative.  Negative for activity change.   HENT: Negative.  Negative for facial swelling.    Eyes: Negative for discharge.   Respiratory: Negative for cough and shortness of breath.    Cardiovascular: Negative for chest pain and palpitations.   Gastrointestinal: Positive for abdominal pain. Negative for abdominal distention, blood in stool and constipation.   Genitourinary: Positive for flank pain and pelvic pain. Negative for dysuria, frequency, hematuria, urgency and vaginal pain.   Musculoskeletal: Negative for arthralgias.   Skin: Negative.    Neurological: Negative.  Negative for dizziness.   Hematological: Negative for adenopathy.   Psychiatric/Behavioral: The patient is not nervous/anxious.        Objective:      Physical Exam   Constitutional: She appears well-developed.   HENT:   Head: Normocephalic.   Eyes: Pupils are equal, round, and reactive to light.   Neck: Normal range of motion.   Cardiovascular: Normal rate.    Pulmonary/Chest: Effort normal.   Abdominal: Soft. She exhibits no distension and no mass. There is tenderness. There is no rebound and no guarding. Hernia confirmed negative in the right inguinal area and confirmed negative in the left inguinal area.   Difuse abdominal pain, no rebound . Abdomen is soft.   Genitourinary: Vagina normal. No erythema, tenderness or bleeding in the vagina.   Musculoskeletal: Normal range of motion.   Neurological: She is alert.   Skin: Skin is warm.     Psychiatric: She has a normal mood and affect.       Assessment:       1. Ureteral stricture        Plan:       Ureteral stricture  -     POCT URINE DIPSTICK WITHOUT MICROSCOPE  -     NM Kidney With Flow And Function; Future; Expected date: 08/14/2017    Triple renal scan, I will call with results and plan.

## 2017-08-15 ENCOUNTER — TELEPHONE (OUTPATIENT)
Dept: UROLOGY | Facility: CLINIC | Age: 42
End: 2017-08-15

## 2017-08-15 NOTE — TELEPHONE ENCOUNTER
Left message for patient to call back.     Patient is scheduled for NM Kidney flow/function 8/22 @10:00

## 2017-08-16 ENCOUNTER — TELEPHONE (OUTPATIENT)
Dept: UROLOGY | Facility: CLINIC | Age: 42
End: 2017-08-16

## 2017-08-16 NOTE — TELEPHONE ENCOUNTER
----- Message from Haydeeraissa Barlow sent at 8/16/2017  3:31 PM CDT -----  Returning your call and also need to speak to you about the severe back pain.  Call [phone].

## 2017-08-17 ENCOUNTER — TELEPHONE (OUTPATIENT)
Dept: UROLOGY | Facility: CLINIC | Age: 42
End: 2017-08-17

## 2017-08-18 NOTE — TELEPHONE ENCOUNTER
Patient advised again that MD will not prescribe narcotic pain medication.  He recommends tylenol or aleve.    Patient states she just had to leave work because she is in severe pain and urinating blood.    Advised patient to go to the ED for evaluation.

## 2017-08-18 NOTE — TELEPHONE ENCOUNTER
"Dr. Pillai said he will not prescribe any more narcotic pain medication.  He recommends that patient tale Aleve or Tylenol as needed for pain.      Patient advised.  She states she is in severe pain.  Patient reports that she takes the pain medication every 6 hours around the clock due to her pain.  She reports that her pain medication is "due today."  She says she has stomach ulcers and cannot take Ibuprofen and Tylenol does not help her at all.  She would like to get enough medication to last until she has her test or receives her results.    Please advise.   "

## 2017-09-19 ENCOUNTER — TELEPHONE (OUTPATIENT)
Dept: FAMILY MEDICINE | Facility: CLINIC | Age: 42
End: 2017-09-19

## 2017-09-19 ENCOUNTER — DOCUMENTATION ONLY (OUTPATIENT)
Dept: FAMILY MEDICINE | Facility: CLINIC | Age: 42
End: 2017-09-19

## 2017-09-19 NOTE — PROGRESS NOTES
Pre-Visit Chart Review  For Appointment Scheduled on (9/19    Health Maintenance Due   Topic Date Due    Lipid Panel  1975    TETANUS VACCINE  04/17/1994    Pneumococcal PPSV23 (Medium Risk) (1) 04/17/1994    Mammogram  04/17/2016    Influenza Vaccine  08/01/2017

## 2017-09-19 NOTE — TELEPHONE ENCOUNTER
Called patient to informed her that Dr. Swartz will not be in the office for her appointment on today , I called 5 times and was sent to voice mail. Waiting on patient to return call to reschedule.

## 2017-09-22 ENCOUNTER — OFFICE VISIT (OUTPATIENT)
Dept: FAMILY MEDICINE | Facility: CLINIC | Age: 42
End: 2017-09-22
Payer: MEDICAID

## 2017-09-22 ENCOUNTER — DOCUMENTATION ONLY (OUTPATIENT)
Dept: FAMILY MEDICINE | Facility: CLINIC | Age: 42
End: 2017-09-22

## 2017-09-22 VITALS
WEIGHT: 120.38 LBS | BODY MASS INDEX: 22.73 KG/M2 | SYSTOLIC BLOOD PRESSURE: 131 MMHG | HEIGHT: 61 IN | HEART RATE: 104 BPM | DIASTOLIC BLOOD PRESSURE: 86 MMHG

## 2017-09-22 DIAGNOSIS — M70.71 BURSITIS OF RIGHT HIP, UNSPECIFIED BURSA: Primary | ICD-10-CM

## 2017-09-22 DIAGNOSIS — F32.A DEPRESSION, UNSPECIFIED DEPRESSION TYPE: ICD-10-CM

## 2017-09-22 PROCEDURE — 99212 OFFICE O/P EST SF 10 MIN: CPT | Mod: PBBFAC,PO | Performed by: FAMILY MEDICINE

## 2017-09-22 PROCEDURE — 99999 PR PBB SHADOW E&M-EST. PATIENT-LVL II: CPT | Mod: PBBFAC,,, | Performed by: FAMILY MEDICINE

## 2017-09-22 PROCEDURE — 99204 OFFICE O/P NEW MOD 45 MIN: CPT | Mod: S$PBB,,, | Performed by: FAMILY MEDICINE

## 2017-09-22 PROCEDURE — 3008F BODY MASS INDEX DOCD: CPT | Mod: ,,, | Performed by: FAMILY MEDICINE

## 2017-09-22 RX ORDER — DEXTROMETHORPHAN HYDROBROMIDE, GUAIFENESIN 5; 100 MG/5ML; MG/5ML
650 LIQUID ORAL EVERY 8 HOURS
Qty: 60 TABLET | Refills: 3 | Status: SHIPPED | OUTPATIENT
Start: 2017-09-22 | End: 2017-09-26 | Stop reason: SDUPTHER

## 2017-09-22 RX ORDER — CYCLOBENZAPRINE HCL 10 MG
10 TABLET ORAL 3 TIMES DAILY PRN
Qty: 30 TABLET | Refills: 1 | Status: SHIPPED | OUTPATIENT
Start: 2017-09-22 | End: 2017-09-26 | Stop reason: SDUPTHER

## 2017-09-22 RX ORDER — BUPROPION HYDROCHLORIDE 150 MG/1
150 TABLET, EXTENDED RELEASE ORAL 2 TIMES DAILY
Qty: 60 TABLET | Refills: 11 | Status: SHIPPED | OUTPATIENT
Start: 2017-09-22 | End: 2018-06-29 | Stop reason: SDUPTHER

## 2017-09-22 NOTE — PROGRESS NOTES
Subjective:       Patient ID: Natividad Acosta is a 42 y.o. female.    Chief Complaint: Hip Pain    HPI     Right hip pain  Pt states that she has had pain for approx 2 days ago.   She has been diagnosed with bursitis in the past.   No history of trauma.   She has taken Norco and flexeril which helped in the past.   Pt has seen physician in the past for this and received injections.   She has not been able to find another provider that will take her insurance.   Denies F/C.     Review of Systems   Constitutional: Negative for chills and fever.   HENT: Negative for sneezing and sore throat.    Eyes: Negative for visual disturbance.   Respiratory: Negative for cough and shortness of breath.    Cardiovascular: Negative for chest pain and leg swelling.   Gastrointestinal: Negative for abdominal pain, blood in stool, constipation, diarrhea and vomiting.   Genitourinary: Negative for difficulty urinating, dysuria and hematuria.   Musculoskeletal: Positive for arthralgias. Negative for back pain.   Neurological: Negative for dizziness and weakness.       Objective:      Physical Exam   Constitutional: She appears well-developed and well-nourished. No distress.   HENT:   Head: Normocephalic and atraumatic.   Mouth/Throat: Oropharynx is clear and moist. No oropharyngeal exudate.   Eyes: EOM are normal. Pupils are equal, round, and reactive to light.   Neck: Normal range of motion. Neck supple. No thyromegaly present.   Cardiovascular: Normal rate, regular rhythm, normal heart sounds and intact distal pulses.    Pulmonary/Chest: Effort normal and breath sounds normal. No respiratory distress. She has no wheezes.   Abdominal: Soft. Bowel sounds are normal. She exhibits no distension and no mass. There is no tenderness.   Musculoskeletal: She exhibits no edema.   Lymphadenopathy:     She has no cervical adenopathy.   Neurological: She is alert.   Skin: Skin is warm. No rash noted. No erythema.   Psychiatric: She has a  normal mood and affect. Her behavior is normal.   Vitals reviewed.      Assessment:       1. Bursitis of right hip, unspecified bursa        Plan:       1. Bursitis of right hip, unspecified bursa  Pt will check to find a Ortho physician who accepts insurance.   - cyclobenzaprine (FLEXERIL) 10 MG tablet; Take 1 tablet (10 mg total) by mouth 3 (three) times daily as needed for Muscle spasms.  Dispense: 30 tablet; Refill: 1  - acetaminophen (TYLENOL) 650 MG TbSR; Take 1 tablet (650 mg total) by mouth every 8 (eight) hours.  Dispense: 60 tablet; Refill: 3    Portions of this note were created using Dragon voice recognition software. There may be voice recognition errors found in the text, and attempts were made to correct these errors prior to signature    Juan Swartz MD    Family Medicine  9/22/2017

## 2017-09-25 ENCOUNTER — TELEPHONE (OUTPATIENT)
Dept: FAMILY MEDICINE | Facility: CLINIC | Age: 42
End: 2017-09-25

## 2017-09-25 NOTE — TELEPHONE ENCOUNTER
----- Message from Carlene Parr sent at 9/25/2017  9:32 AM CDT -----  Contact: self  Patient called asking for advice about a referral to pain management for right hip and shoulder.  Please call patient at [phone]. Thanks!

## 2017-09-25 NOTE — TELEPHONE ENCOUNTER
PAtient is calling to get a referral to pain management. Advised to call her insurance to find out who is covered.  Once info is received a referral will be requested. Patient verbalized understanding.

## 2017-09-26 ENCOUNTER — TELEPHONE (OUTPATIENT)
Dept: FAMILY MEDICINE | Facility: CLINIC | Age: 42
End: 2017-09-26

## 2017-09-26 DIAGNOSIS — M70.71 BURSITIS OF RIGHT HIP, UNSPECIFIED BURSA: ICD-10-CM

## 2017-09-26 RX ORDER — DEXTROMETHORPHAN HYDROBROMIDE, GUAIFENESIN 5; 100 MG/5ML; MG/5ML
650 LIQUID ORAL EVERY 8 HOURS
Qty: 60 TABLET | Refills: 3 | Status: SHIPPED | OUTPATIENT
Start: 2017-09-26 | End: 2018-08-29 | Stop reason: CLARIF

## 2017-09-26 RX ORDER — CYCLOBENZAPRINE HCL 10 MG
10 TABLET ORAL 3 TIMES DAILY PRN
Qty: 30 TABLET | Refills: 1 | Status: SHIPPED | OUTPATIENT
Start: 2017-09-26 | End: 2017-10-06

## 2017-09-26 NOTE — TELEPHONE ENCOUNTER
Please inform patient that I have refilled Flexeril and Tylenol.  I will not prescribe Percocet.  The patient will need to call the Medicaid escalation line at [phone].  She can inquire about what providers excepts Medicaid in our area.

## 2017-09-26 NOTE — TELEPHONE ENCOUNTER
----- Message from Lucia Espinosa sent at 9/26/2017 10:58 AM CDT -----  Contact: pt [phone]  Patient called and asked for  A call back she is not able to find a pain management doctor that will take her medicaid she is asking if you will call some medication in for pain.

## 2017-09-26 NOTE — TELEPHONE ENCOUNTER
Patient has been unable to locate a pain management doctor who is taking new medicaid patient.  She is looking for one and is calling medicaid for direction.   Patient states that the pain in her hip is excruciating and is requesting medication for pain while she locates a doctor. Please advise.

## 2018-01-23 DIAGNOSIS — Z12.39 SCREENING FOR BREAST CANCER: Primary | ICD-10-CM

## 2018-01-26 ENCOUNTER — DOCUMENTATION ONLY (OUTPATIENT)
Dept: FAMILY MEDICINE | Facility: CLINIC | Age: 43
End: 2018-01-26

## 2018-03-07 ENCOUNTER — HOSPITAL ENCOUNTER (EMERGENCY)
Facility: HOSPITAL | Age: 43
Discharge: HOME | End: 2018-03-07
Attending: EMERGENCY MEDICINE
Payer: MEDICAID

## 2018-03-07 VITALS
DIASTOLIC BLOOD PRESSURE: 72 MMHG | SYSTOLIC BLOOD PRESSURE: 136 MMHG | HEART RATE: 89 BPM | BODY MASS INDEX: 25.48 KG/M2 | WEIGHT: 134.94 LBS | RESPIRATION RATE: 16 BRPM | OXYGEN SATURATION: 99 % | HEIGHT: 61 IN | TEMPERATURE: 98 F

## 2018-03-07 DIAGNOSIS — N20.0 NEPHROLITHIASIS: ICD-10-CM

## 2018-03-07 DIAGNOSIS — R10.9 RIGHT FLANK PAIN: Primary | ICD-10-CM

## 2018-03-07 DIAGNOSIS — R31.9 HEMATURIA, UNSPECIFIED TYPE: ICD-10-CM

## 2018-03-07 LAB
ALBUMIN SERPL BCP-MCNC: 3.7 G/DL
ALP SERPL-CCNC: 81 U/L
ALT SERPL W/O P-5'-P-CCNC: 13 U/L
ANION GAP SERPL CALC-SCNC: 9 MMOL/L
AST SERPL-CCNC: 15 U/L
BASOPHILS # BLD AUTO: 0.07 K/UL
BASOPHILS NFR BLD: 0.8 %
BILIRUB SERPL-MCNC: 0.1 MG/DL
BILIRUB UR QL STRIP: NEGATIVE
BUN SERPL-MCNC: 10 MG/DL
CALCIUM SERPL-MCNC: 9.5 MG/DL
CHLORIDE SERPL-SCNC: 101 MMOL/L
CLARITY UR: CLEAR
CO2 SERPL-SCNC: 29 MMOL/L
COLOR UR: YELLOW
CREAT SERPL-MCNC: 0.9 MG/DL
DIFFERENTIAL METHOD: ABNORMAL
EOSINOPHIL # BLD AUTO: 0.3 K/UL
EOSINOPHIL NFR BLD: 3.4 %
ERYTHROCYTE [DISTWIDTH] IN BLOOD BY AUTOMATED COUNT: 11.7 %
EST. GFR  (AFRICAN AMERICAN): >60 ML/MIN/1.73 M^2
EST. GFR  (NON AFRICAN AMERICAN): >60 ML/MIN/1.73 M^2
GLUCOSE SERPL-MCNC: 83 MG/DL
GLUCOSE UR QL STRIP: NEGATIVE
HCT VFR BLD AUTO: 42.4 %
HGB BLD-MCNC: 14.7 G/DL
HGB UR QL STRIP: ABNORMAL
KETONES UR QL STRIP: NEGATIVE
LEUKOCYTE ESTERASE UR QL STRIP: ABNORMAL
LYMPHOCYTES # BLD AUTO: 3.2 K/UL
LYMPHOCYTES NFR BLD: 35.5 %
MCH RBC QN AUTO: 34.1 PG
MCHC RBC AUTO-ENTMCNC: 34.7 G/DL
MCV RBC AUTO: 98 FL
MICROSCOPIC COMMENT: ABNORMAL
MONOCYTES # BLD AUTO: 0.5 K/UL
MONOCYTES NFR BLD: 5.9 %
NEUTROPHILS # BLD AUTO: 4.9 K/UL
NEUTROPHILS NFR BLD: 54.4 %
NITRITE UR QL STRIP: NEGATIVE
PH UR STRIP: 7 [PH] (ref 5–8)
PLATELET # BLD AUTO: 357 K/UL
PMV BLD AUTO: 10 FL
POTASSIUM SERPL-SCNC: 4.2 MMOL/L
PROT SERPL-MCNC: 7.1 G/DL
PROT UR QL STRIP: NEGATIVE
RBC # BLD AUTO: 4.31 M/UL
RBC #/AREA URNS HPF: >100 /HPF (ref 0–4)
SODIUM SERPL-SCNC: 139 MMOL/L
SP GR UR STRIP: 1.01 (ref 1–1.03)
URN SPEC COLLECT METH UR: ABNORMAL
UROBILINOGEN UR STRIP-ACNC: NEGATIVE EU/DL
WBC # BLD AUTO: 9.02 K/UL
WBC #/AREA URNS HPF: 3 /HPF (ref 0–5)
WBC CLUMPS URNS QL MICRO: ABNORMAL

## 2018-03-07 PROCEDURE — 96361 HYDRATE IV INFUSION ADD-ON: CPT

## 2018-03-07 PROCEDURE — 85025 COMPLETE CBC W/AUTO DIFF WBC: CPT

## 2018-03-07 PROCEDURE — 96375 TX/PRO/DX INJ NEW DRUG ADDON: CPT

## 2018-03-07 PROCEDURE — 63600175 PHARM REV CODE 636 W HCPCS: Performed by: EMERGENCY MEDICINE

## 2018-03-07 PROCEDURE — 96365 THER/PROPH/DIAG IV INF INIT: CPT

## 2018-03-07 PROCEDURE — 81000 URINALYSIS NONAUTO W/SCOPE: CPT

## 2018-03-07 PROCEDURE — 99284 EMERGENCY DEPT VISIT MOD MDM: CPT | Mod: 25

## 2018-03-07 PROCEDURE — 25000003 PHARM REV CODE 250: Performed by: EMERGENCY MEDICINE

## 2018-03-07 PROCEDURE — 80053 COMPREHEN METABOLIC PANEL: CPT

## 2018-03-07 RX ORDER — PROMETHAZINE HYDROCHLORIDE 25 MG/1
25 TABLET ORAL EVERY 6 HOURS PRN
Qty: 15 TABLET | Refills: 0 | Status: SHIPPED | OUTPATIENT
Start: 2018-03-07 | End: 2018-08-29 | Stop reason: CLARIF

## 2018-03-07 RX ORDER — OXYCODONE AND ACETAMINOPHEN 5; 325 MG/1; MG/1
1 TABLET ORAL EVERY 4 HOURS PRN
Qty: 6 TABLET | Refills: 0 | Status: SHIPPED | OUTPATIENT
Start: 2018-03-07 | End: 2018-08-29 | Stop reason: CLARIF

## 2018-03-07 RX ORDER — MEPERIDINE HYDROCHLORIDE 50 MG/ML
25 INJECTION INTRAMUSCULAR; INTRAVENOUS; SUBCUTANEOUS
Status: COMPLETED | OUTPATIENT
Start: 2018-03-07 | End: 2018-03-07

## 2018-03-07 RX ADMIN — PROMETHAZINE HYDROCHLORIDE 12.5 MG: 25 INJECTION INTRAMUSCULAR; INTRAVENOUS at 04:03

## 2018-03-07 RX ADMIN — SODIUM CHLORIDE 1000 ML: 0.9 INJECTION, SOLUTION INTRAVENOUS at 04:03

## 2018-03-07 RX ADMIN — MEPERIDINE HYDROCHLORIDE 25 MG: 50 INJECTION INTRAMUSCULAR; INTRAVENOUS; SUBCUTANEOUS at 04:03

## 2018-03-07 NOTE — ED PROVIDER NOTES
SCRIBE #1 NOTE: I, Esa Neville, am scribing for, and in the presence of, Natalia Valera MD. I have scribed the entire note.      History      Chief Complaint   Patient presents with    Flank Pain     pt c/o donnell flank pain that is worse on the Rt side, reports some dysuria, hx of kidney stones       Review of patient's allergies indicates:   Allergen Reactions    Stadol [butorphanol tartrate] Anaphylaxis    Demerol [meperidine]      vomiting    Levaquin [levofloxacin] Hives    Morphine      vomiting      Reglan [metoclopramide] Hives    Toradol [ketorolac] Other (See Comments)     Told not to take due to kidneys    Zofran [ondansetron hcl (pf)]     Iodine Rash        HPI   HPI    3/7/2018, 3:29 PM   History obtained from the patient      History of Present Illness: Natividad Acosta is a 42 y.o. female patient who presents to the Emergency Department for right sided flank pain which onset gradually this Am, worsening 1 hour ago. Pt reports a Hx of nephrolithiasis, and states that her current pain is similar. Symptoms are constant and moderate in severity.  No mitigating or exacerbating factors reported. Associated sxs include hematuria and nausea. Pt reports that she is not allergic to demerol. Patient denies any fever, chills, v/d, abd pain, changes in urinary frequency/ urgency, and all other sxs at this time. No further complaints or concerns at this time.         Arrival mode: Personal vehicle     PCP: Juan Swartz MD       Past Medical History:  Past Medical History:   Diagnosis Date    Kidney stone     Renal disorder kidney stones       Past Surgical History:  Past Surgical History:   Procedure Laterality Date    APPENDECTOMY       SECTION      CHOLECYSTECTOMY      HERNIA REPAIR Left 2017    HYSTERECTOMY      kidney stent      SHOULDER ARTHROSCOPY Right     TONSILLECTOMY      URETER SURGERY           Family History:  History reviewed. No pertinent family  history.    Social History:  Social History     Social History Main Topics    Smoking status: Current Every Day Smoker     Packs/day: 1.00    Smokeless tobacco: Never Used    Alcohol use Yes      Comment: occ    Drug use: No    Sexual activity: Unknown       ROS   Review of Systems   Constitutional: Negative for chills and fever.   Respiratory: Negative for shortness of breath.    Cardiovascular: Negative for chest pain.   Gastrointestinal: Positive for nausea. Negative for abdominal pain, diarrhea and vomiting.   Genitourinary: Positive for flank pain and hematuria. Negative for difficulty urinating, dysuria, frequency and urgency.   Neurological: Negative for dizziness, syncope, weakness, light-headedness, numbness and headaches.   All other systems reviewed and are negative.      Physical Exam      Initial Vitals [03/07/18 1520]   BP Pulse Resp Temp SpO2   (!) 146/69 108 20 97.6 °F (36.4 °C) 99 %      MAP       94.67          Physical Exam  Nursing Notes and Vital Signs Reviewed.  Constitutional: Patient is in moderate distress, writhing around, and unable to sit still. Well-developed and well-nourished.  Head: Atraumatic. Normocephalic.  Eyes: PERRL. EOM intact. Conjunctivae are not pale. No scleral icterus.  ENT: Mucous membranes are moist. Oropharynx is clear and symmetric.    Neck: Supple. Full ROM. No lymphadenopathy.  Cardiovascular: Regular rate. Regular rhythm. No murmurs, rubs, or gallops. Distal pulses are 2+ and symmetric.  Pulmonary/Chest: No respiratory distress. Clear to auscultation bilaterally. No wheezing or rales.  Abdominal: Soft and non-distended.  There is no tenderness.  No rebound, guarding, or rigidity. Good bowel sounds.  Musculoskeletal: Moves all extremities. No obvious deformities. No edema. No calf tenderness.  Genitourinary: Right sided CVA tenderness.   Skin: Warm and dry.  Neurological:  Alert, awake, and appropriate.  Normal speech.  No acute focal neurological deficits are  "appreciated.  Psychiatric: Normal affect. Good eye contact. Appropriate in content.    ED Course    Procedures  ED Vital Signs:  Vitals:    03/07/18 1520 03/07/18 1726   BP: (!) 146/69 136/72   Pulse: 108 89   Resp: 20 16   Temp: 97.6 °F (36.4 °C)    TempSrc: Oral    SpO2: 99% 99%   Weight: 61.2 kg (134 lb 14.7 oz)    Height: 5' 1" (1.549 m)        Abnormal Lab Results:  Labs Reviewed   URINALYSIS - Abnormal; Notable for the following:        Result Value    Occult Blood UA 3+ (*)     Leukocytes, UA Trace (*)     All other components within normal limits   URINALYSIS MICROSCOPIC - Abnormal; Notable for the following:     RBC, UA >100 (*)     All other components within normal limits   CBC W/ AUTO DIFFERENTIAL - Abnormal; Notable for the following:     MCH 34.1 (*)     Platelets 357 (*)     All other components within normal limits   COMPREHENSIVE METABOLIC PANEL        All Lab Results:  Results for orders placed or performed during the hospital encounter of 03/07/18   Urinalysis Clean Catch   Result Value Ref Range    Specimen UA Urine, Clean Catch     Color, UA Yellow Yellow, Straw, Robyn    Appearance, UA Clear Clear    pH, UA 7.0 5.0 - 8.0    Specific Gravity, UA 1.010 1.005 - 1.030    Protein, UA Negative Negative    Glucose, UA Negative Negative    Ketones, UA Negative Negative    Bilirubin (UA) Negative Negative    Occult Blood UA 3+ (A) Negative    Nitrite, UA Negative Negative    Urobilinogen, UA Negative <2.0 EU/dL    Leukocytes, UA Trace (A) Negative   Urinalysis Microscopic   Result Value Ref Range    RBC, UA >100 (H) 0 - 4 /hpf    WBC, UA 3 0 - 5 /hpf    WBC Clumps, UA Rare None-Rare    Microscopic Comment SEE COMMENT    CBC auto differential   Result Value Ref Range    WBC 9.02 3.90 - 12.70 K/uL    RBC 4.31 4.00 - 5.40 M/uL    Hemoglobin 14.7 12.0 - 16.0 g/dL    Hematocrit 42.4 37.0 - 48.5 %    MCV 98 82 - 98 fL    MCH 34.1 (H) 27.0 - 31.0 pg    MCHC 34.7 32.0 - 36.0 g/dL    RDW 11.7 11.5 - 14.5 %    " Platelets 357 (H) 150 - 350 K/uL    MPV 10.0 9.2 - 12.9 fL    Gran # (ANC) 4.9 1.8 - 7.7 K/uL    Lymph # 3.2 1.0 - 4.8 K/uL    Mono # 0.5 0.3 - 1.0 K/uL    Eos # 0.3 0.0 - 0.5 K/uL    Baso # 0.07 0.00 - 0.20 K/uL    Gran% 54.4 38.0 - 73.0 %    Lymph% 35.5 18.0 - 48.0 %    Mono% 5.9 4.0 - 15.0 %    Eosinophil% 3.4 0.0 - 8.0 %    Basophil% 0.8 0.0 - 1.9 %    Differential Method Automated    Comprehensive metabolic panel   Result Value Ref Range    Sodium 139 136 - 145 mmol/L    Potassium 4.2 3.5 - 5.1 mmol/L    Chloride 101 95 - 110 mmol/L    CO2 29 23 - 29 mmol/L    Glucose 83 70 - 110 mg/dL    BUN, Bld 10 6 - 20 mg/dL    Creatinine 0.9 0.5 - 1.4 mg/dL    Calcium 9.5 8.7 - 10.5 mg/dL    Total Protein 7.1 6.0 - 8.4 g/dL    Albumin 3.7 3.5 - 5.2 g/dL    Total Bilirubin 0.1 0.1 - 1.0 mg/dL    Alkaline Phosphatase 81 55 - 135 U/L    AST 15 10 - 40 U/L    ALT 13 10 - 44 U/L    Anion Gap 9 8 - 16 mmol/L    eGFR if African American >60 >60 mL/min/1.73 m^2    eGFR if non African American >60 >60 mL/min/1.73 m^2         Imaging Results:  Imaging Results          CT Renal Stone Study ABD Pelvis WO (Final result)  Result time 03/07/18 16:45:49    Final result by Chloe Hu MD (03/07/18 16:45:49)                 Impression:     Nonobstructing left nephrolithiasis.  No acute hydronephrosis.    Status post cholecystectomy.    Normal appendix.      All CT scans at this facility use automated dose modulation, iterative reconstruction and/or weight based dosing techniques when appropriate to reduce radiation dose to as low as reasonably achievable.       Electronically signed by: CHLOE HU MD  Date:     03/07/18  Time:    16:45              Narrative:    CT RENAL STONE STUDY ABD PELVIS WO,     Date:  03/07/18 16:15:59    Technique: Limited noncontrast CT scan of the abdomen and pelvis.    Comparison: 08/09/2017.    History:  Right flank pain.  Nephrolithiasis.,     Findings:  The lung bases are clear.     The liver,  spleen, and pancreas appear grossly normal.  The gallbladder is absent with clips in the gallbladder fossa.    The left kidney demonstrates a nonobstructing 2 mm calcification in the midpole, best seen on axial image #42.    The right kidney is grossly normal.  No definite hydronephrosis or nephrolithiasis.    The bowel loops appear normal.  The appendix in the right lower quadrant is normal.                                      The Emergency Provider reviewed the vital signs and test results, which are outlined above.    ED Discussion     5:20 PM: Reassessed pt at this time, sleeping had to be woken up. Discussed with pt all pertinent ED information and results. Discussed pt dx and plan of tx. Gave pt all f/u and return to the ED instructions. All questions and concerns were addressed at this time. Pt expresses understanding of information and instructions, and is comfortable with plan to discharge. Pt is stable for discharge.    I discussed with patient and/or family/caretaker that evaluation in the ED does not suggest any emergent or life threatening medical conditions requiring immediate intervention beyond what was provided in the ED, and I believe patient is safe for discharge.  Regardless, an unremarkable evaluation in the ED does not preclude the development or presence of a serious of life threatening condition. As such, patient was instructed to return immediately for any worsening or change in current symptoms.      ED Medication(s):  Medications   meperidine injection 25 mg (25 mg Intravenous Given 3/7/18 1606)   promethazine (PHENERGAN) 12.5 mg in dextrose 5 % 50 mL IVPB (0 mg Intravenous Stopped 3/7/18 1625)   sodium chloride 0.9% bolus 1,000 mL (0 mLs Intravenous Stopped 3/7/18 1720)       Discharge Medication List as of 3/7/2018  5:18 PM      START taking these medications    Details   oxyCODONE-acetaminophen (PERCOCET) 5-325 mg per tablet Take 1 tablet by mouth every 4 (four) hours as needed for  Pain., Starting Wed 3/7/2018, Print      promethazine (PHENERGAN) 25 MG tablet Take 1 tablet (25 mg total) by mouth every 6 (six) hours as needed for Nausea., Starting Wed 3/7/2018, Print             Follow-up Information     [facility]. Schedule an appointment as soon as possible for a visit in 2 days.    Contact information:  [phone]           [facility]. Schedule an appointment as soon as possible for a visit in 2 days.    Specialty:  Urology  Contact information:  [address] [facility] [address]  [phone]                   Medical Decision Making    Medical Decision Making:   Clinical Tests:   Lab Tests: Reviewed and Ordered  Radiological Study: Ordered and Reviewed           Scribe Attestation:   Scribe #1: I performed the above scribed service and the documentation accurately describes the services I performed. I attest to the accuracy of the note.    Attending:   Physician Attestation Statement for Scribe #1: I, Natalia Valera MD, personally performed the services described in this documentation, as scribed by Esa Neville, in my presence, and it is both accurate and complete.          Clinical Impression       ICD-10-CM ICD-9-CM   1. Right flank pain R10.9 789.09   2. Hematuria, unspecified type R31.9 599.70   3. Nephrolithiasis N20.0 592.0       Disposition:   Disposition: Discharged  Condition: Stable         Natalia Valera MD  03/07/18 [id number]

## 2018-05-18 ENCOUNTER — TELEPHONE (OUTPATIENT)
Dept: FAMILY MEDICINE | Facility: CLINIC | Age: 43
End: 2018-05-18

## 2018-05-18 NOTE — TELEPHONE ENCOUNTER
----- Message from Nia Valadez sent at 5/18/2018 10:26 AM CDT -----  Prabhjot Oquendo / odettefrienclark [phone] ... She is suffering with a lot of anxiety ... Asking to be seen today (no appt's  In [place] or [place])

## 2018-07-02 RX ORDER — BUPROPION HYDROCHLORIDE 150 MG/1
TABLET, EXTENDED RELEASE ORAL
Qty: 60 TABLET | Refills: 0 | Status: SHIPPED | OUTPATIENT
Start: 2018-07-02 | End: 2019-04-11 | Stop reason: SDUPTHER

## 2018-08-29 ENCOUNTER — HOSPITAL ENCOUNTER (INPATIENT)
Facility: HOSPITAL | Age: 43
LOS: 2 days | Discharge: HOME | DRG: 872 | End: 2018-08-31
Attending: EMERGENCY MEDICINE | Admitting: HOSPITALIST

## 2018-08-29 DIAGNOSIS — N12 PYELONEPHRITIS: Primary | ICD-10-CM

## 2018-08-29 DIAGNOSIS — A41.9 SEPSIS, DUE TO UNSPECIFIED ORGANISM: ICD-10-CM

## 2018-08-29 PROBLEM — N39.0 URINARY TRACT INFECTION WITH HEMATURIA: Status: ACTIVE | Noted: 2018-08-29

## 2018-08-29 PROBLEM — R31.9 URINARY TRACT INFECTION WITH HEMATURIA: Status: ACTIVE | Noted: 2018-08-29

## 2018-08-29 PROBLEM — Z72.0 TOBACCO ABUSE: Status: ACTIVE | Noted: 2018-08-29

## 2018-08-29 LAB
ALBUMIN SERPL BCP-MCNC: 4.3 G/DL
ALP SERPL-CCNC: 96 U/L
ALT SERPL W/O P-5'-P-CCNC: 12 U/L
ANION GAP SERPL CALC-SCNC: 13 MMOL/L
APTT BLDCRRT: 26.3 SEC
AST SERPL-CCNC: 14 U/L
B-HCG UR QL: NEGATIVE
BACTERIA #/AREA URNS HPF: ABNORMAL /HPF
BASOPHILS # BLD AUTO: 0.1 K/UL
BASOPHILS NFR BLD: 0.5 %
BILIRUB SERPL-MCNC: 0.5 MG/DL
BILIRUB UR QL STRIP: NEGATIVE
BUN SERPL-MCNC: 12 MG/DL
CALCIUM SERPL-MCNC: 9.6 MG/DL
CHLORIDE SERPL-SCNC: 98 MMOL/L
CLARITY UR: CLEAR
CO2 SERPL-SCNC: 25 MMOL/L
COLOR UR: YELLOW
CREAT SERPL-MCNC: 0.8 MG/DL
CTP QC/QA: YES
DIFFERENTIAL METHOD: ABNORMAL
EOSINOPHIL # BLD AUTO: 0.3 K/UL
EOSINOPHIL NFR BLD: 1.4 %
ERYTHROCYTE [DISTWIDTH] IN BLOOD BY AUTOMATED COUNT: 11.9 %
EST. GFR  (AFRICAN AMERICAN): >60 ML/MIN/1.73 M^2
EST. GFR  (NON AFRICAN AMERICAN): >60 ML/MIN/1.73 M^2
GLUCOSE SERPL-MCNC: 99 MG/DL
GLUCOSE UR QL STRIP: NEGATIVE
HCT VFR BLD AUTO: 45.2 %
HGB BLD-MCNC: 15.1 G/DL
HGB UR QL STRIP: ABNORMAL
INR PPP: 1
KETONES UR QL STRIP: NEGATIVE
LACTATE SERPL-SCNC: 0.7 MMOL/L
LACTATE SERPL-SCNC: 1 MMOL/L
LEUKOCYTE ESTERASE UR QL STRIP: ABNORMAL
LYMPHOCYTES # BLD AUTO: 3.1 K/UL
LYMPHOCYTES NFR BLD: 15.7 %
MCH RBC QN AUTO: 32.1 PG
MCHC RBC AUTO-ENTMCNC: 33.5 G/DL
MCV RBC AUTO: 96 FL
MICROSCOPIC COMMENT: ABNORMAL
MONOCYTES # BLD AUTO: 1 K/UL
MONOCYTES NFR BLD: 5.2 %
NEUTROPHILS # BLD AUTO: 15.1 K/UL
NEUTROPHILS NFR BLD: 77.2 %
NITRITE UR QL STRIP: POSITIVE
PH UR STRIP: 6 [PH] (ref 5–8)
PLATELET # BLD AUTO: 434 K/UL
PMV BLD AUTO: 7.6 FL
POTASSIUM SERPL-SCNC: 3.6 MMOL/L
PROT SERPL-MCNC: 8.3 G/DL
PROT UR QL STRIP: NEGATIVE
PROTHROMBIN TIME: 10 SEC
RBC # BLD AUTO: 4.72 M/UL
RBC #/AREA URNS HPF: 40 /HPF (ref 0–4)
SODIUM SERPL-SCNC: 136 MMOL/L
SP GR UR STRIP: 1.01 (ref 1–1.03)
SQUAMOUS #/AREA URNS HPF: 5 /HPF
URN SPEC COLLECT METH UR: ABNORMAL
UROBILINOGEN UR STRIP-ACNC: NEGATIVE EU/DL
WBC # BLD AUTO: 19.5 K/UL
WBC #/AREA URNS HPF: 12 /HPF (ref 0–5)

## 2018-08-29 PROCEDURE — 87040 BLOOD CULTURE FOR BACTERIA: CPT

## 2018-08-29 PROCEDURE — 87088 URINE BACTERIA CULTURE: CPT

## 2018-08-29 PROCEDURE — 36415 COLL VENOUS BLD VENIPUNCTURE: CPT

## 2018-08-29 PROCEDURE — 63600175 PHARM REV CODE 636 W HCPCS: Performed by: NURSE PRACTITIONER

## 2018-08-29 PROCEDURE — 96374 THER/PROPH/DIAG INJ IV PUSH: CPT

## 2018-08-29 PROCEDURE — 85610 PROTHROMBIN TIME: CPT

## 2018-08-29 PROCEDURE — 99285 EMERGENCY DEPT VISIT HI MDM: CPT | Mod: 25

## 2018-08-29 PROCEDURE — 81000 URINALYSIS NONAUTO W/SCOPE: CPT

## 2018-08-29 PROCEDURE — 81025 URINE PREGNANCY TEST: CPT | Performed by: EMERGENCY MEDICINE

## 2018-08-29 PROCEDURE — 87086 URINE CULTURE/COLONY COUNT: CPT

## 2018-08-29 PROCEDURE — 85730 THROMBOPLASTIN TIME PARTIAL: CPT

## 2018-08-29 PROCEDURE — 12000002 HC ACUTE/MED SURGE SEMI-PRIVATE ROOM

## 2018-08-29 PROCEDURE — 87077 CULTURE AEROBIC IDENTIFY: CPT

## 2018-08-29 PROCEDURE — 96361 HYDRATE IV INFUSION ADD-ON: CPT

## 2018-08-29 PROCEDURE — 96375 TX/PRO/DX INJ NEW DRUG ADDON: CPT

## 2018-08-29 PROCEDURE — 87186 SC STD MICRODIL/AGAR DIL: CPT

## 2018-08-29 PROCEDURE — 80053 COMPREHEN METABOLIC PANEL: CPT

## 2018-08-29 PROCEDURE — 63600175 PHARM REV CODE 636 W HCPCS: Performed by: EMERGENCY MEDICINE

## 2018-08-29 PROCEDURE — 96376 TX/PRO/DX INJ SAME DRUG ADON: CPT

## 2018-08-29 PROCEDURE — 85025 COMPLETE CBC W/AUTO DIFF WBC: CPT

## 2018-08-29 PROCEDURE — 25000003 PHARM REV CODE 250: Performed by: NURSE PRACTITIONER

## 2018-08-29 PROCEDURE — 25000003 PHARM REV CODE 250: Performed by: EMERGENCY MEDICINE

## 2018-08-29 PROCEDURE — 83605 ASSAY OF LACTIC ACID: CPT

## 2018-08-29 RX ORDER — ACETAMINOPHEN 10 MG/ML
1000 INJECTION, SOLUTION INTRAVENOUS EVERY 8 HOURS
Status: COMPLETED | OUTPATIENT
Start: 2018-08-29 | End: 2018-08-30

## 2018-08-29 RX ORDER — RAMELTEON 8 MG/1
8 TABLET ORAL NIGHTLY PRN
Status: DISCONTINUED | OUTPATIENT
Start: 2018-08-29 | End: 2018-08-31 | Stop reason: HOSPADM

## 2018-08-29 RX ORDER — CEFTRIAXONE 1 G/50ML
1 INJECTION, SOLUTION INTRAVENOUS
Status: DISCONTINUED | OUTPATIENT
Start: 2018-08-30 | End: 2018-08-31 | Stop reason: HOSPADM

## 2018-08-29 RX ORDER — IBUPROFEN 200 MG
1 TABLET ORAL DAILY
Status: DISCONTINUED | OUTPATIENT
Start: 2018-08-30 | End: 2018-08-31 | Stop reason: HOSPADM

## 2018-08-29 RX ORDER — ACETAMINOPHEN 325 MG/1
650 TABLET ORAL EVERY 4 HOURS PRN
Status: DISCONTINUED | OUTPATIENT
Start: 2018-08-29 | End: 2018-08-31 | Stop reason: HOSPADM

## 2018-08-29 RX ORDER — SODIUM CHLORIDE 9 MG/ML
INJECTION, SOLUTION INTRAVENOUS CONTINUOUS
Status: DISCONTINUED | OUTPATIENT
Start: 2018-08-29 | End: 2018-08-31 | Stop reason: HOSPADM

## 2018-08-29 RX ADMIN — Medication 1 MG: at 07:08

## 2018-08-29 RX ADMIN — SODIUM CHLORIDE 1000 ML: 0.9 INJECTION, SOLUTION INTRAVENOUS at 07:08

## 2018-08-29 RX ADMIN — PROMETHAZINE HYDROCHLORIDE 12.5 MG: 25 INJECTION INTRAMUSCULAR; INTRAVENOUS at 07:08

## 2018-08-29 RX ADMIN — SODIUM CHLORIDE: 0.9 INJECTION, SOLUTION INTRAVENOUS at 10:08

## 2018-08-29 RX ADMIN — ACETAMINOPHEN 1000 MG: 10 INJECTION, SOLUTION INTRAVENOUS at 11:08

## 2018-08-29 RX ADMIN — Medication 1 MG: at 08:08

## 2018-08-29 RX ADMIN — PROMETHAZINE HYDROCHLORIDE 12.5 MG: 25 INJECTION INTRAMUSCULAR; INTRAVENOUS at 09:08

## 2018-08-29 RX ADMIN — CEFTRIAXONE SODIUM 2 G: 2 INJECTION, POWDER, FOR SOLUTION INTRAMUSCULAR; INTRAVENOUS at 09:08

## 2018-08-29 NOTE — LETTER
August 31, 2018         [address] [place] [address]  [place] [address]  Phone: [phone]       Patient: Natividad Acosta   YOB: 1975  Date of Visit: 08/31/2018    To Whom It May Concern:    Angelo Acosta  was at [facility] on 8/29- 08/31/2018. She may [facility]. If you have any questions or concerns, or if I can be of further assistance, please do not hesitate to contact me.    Sincerely,    Inga Buchanan, RN  [phone]

## 2018-08-30 PROBLEM — N20.0 NEPHROLITHIASIS: Status: ACTIVE | Noted: 2018-08-30

## 2018-08-30 LAB
ALBUMIN SERPL BCP-MCNC: 3 G/DL
ALP SERPL-CCNC: 67 U/L
ALT SERPL W/O P-5'-P-CCNC: 9 U/L
ANION GAP SERPL CALC-SCNC: 7 MMOL/L
AST SERPL-CCNC: 11 U/L
BASOPHILS # BLD AUTO: 0.1 K/UL
BASOPHILS NFR BLD: 0.5 %
BILIRUB SERPL-MCNC: 0.4 MG/DL
BUN SERPL-MCNC: 11 MG/DL
CALCIUM SERPL-MCNC: 8 MG/DL
CHLORIDE SERPL-SCNC: 104 MMOL/L
CO2 SERPL-SCNC: 24 MMOL/L
CREAT SERPL-MCNC: 0.7 MG/DL
DIFFERENTIAL METHOD: ABNORMAL
EOSINOPHIL # BLD AUTO: 0.2 K/UL
EOSINOPHIL NFR BLD: 2.1 %
ERYTHROCYTE [DISTWIDTH] IN BLOOD BY AUTOMATED COUNT: 11.9 %
EST. GFR  (AFRICAN AMERICAN): >60 ML/MIN/1.73 M^2
EST. GFR  (NON AFRICAN AMERICAN): >60 ML/MIN/1.73 M^2
GLUCOSE SERPL-MCNC: 99 MG/DL
HCT VFR BLD AUTO: 36.6 %
HGB BLD-MCNC: 12.4 G/DL
LACTATE SERPL-SCNC: 1 MMOL/L
LACTATE SERPL-SCNC: 1.6 MMOL/L
LYMPHOCYTES # BLD AUTO: 2 K/UL
LYMPHOCYTES NFR BLD: 19.9 %
MAGNESIUM SERPL-MCNC: 1.7 MG/DL
MCH RBC QN AUTO: 32.5 PG
MCHC RBC AUTO-ENTMCNC: 34 G/DL
MCV RBC AUTO: 96 FL
MONOCYTES # BLD AUTO: 0.2 K/UL
MONOCYTES NFR BLD: 2.4 %
NEUTROPHILS # BLD AUTO: 7.6 K/UL
NEUTROPHILS NFR BLD: 75.1 %
PHOSPHATE SERPL-MCNC: 3.1 MG/DL
PLATELET # BLD AUTO: 303 K/UL
PMV BLD AUTO: 7.8 FL
POTASSIUM SERPL-SCNC: 3.9 MMOL/L
PROT SERPL-MCNC: 5.8 G/DL
RBC # BLD AUTO: 3.83 M/UL
SODIUM SERPL-SCNC: 135 MMOL/L
WBC # BLD AUTO: 10.2 K/UL

## 2018-08-30 PROCEDURE — 80053 COMPREHEN METABOLIC PANEL: CPT

## 2018-08-30 PROCEDURE — 83735 ASSAY OF MAGNESIUM: CPT

## 2018-08-30 PROCEDURE — 83605 ASSAY OF LACTIC ACID: CPT

## 2018-08-30 PROCEDURE — 36415 COLL VENOUS BLD VENIPUNCTURE: CPT

## 2018-08-30 PROCEDURE — 63600175 PHARM REV CODE 636 W HCPCS: Performed by: NURSE PRACTITIONER

## 2018-08-30 PROCEDURE — 85025 COMPLETE CBC W/AUTO DIFF WBC: CPT

## 2018-08-30 PROCEDURE — 94760 N-INVAS EAR/PLS OXIMETRY 1: CPT

## 2018-08-30 PROCEDURE — 94761 N-INVAS EAR/PLS OXIMETRY MLT: CPT

## 2018-08-30 PROCEDURE — 83605 ASSAY OF LACTIC ACID: CPT | Mod: 91

## 2018-08-30 PROCEDURE — 25000003 PHARM REV CODE 250: Performed by: NURSE PRACTITIONER

## 2018-08-30 PROCEDURE — 12000002 HC ACUTE/MED SURGE SEMI-PRIVATE ROOM

## 2018-08-30 PROCEDURE — 84100 ASSAY OF PHOSPHORUS: CPT

## 2018-08-30 RX ORDER — BUPROPION HYDROCHLORIDE 150 MG/1
150 TABLET, EXTENDED RELEASE ORAL 2 TIMES DAILY
Status: DISCONTINUED | OUTPATIENT
Start: 2018-08-30 | End: 2018-08-31 | Stop reason: HOSPADM

## 2018-08-30 RX ADMIN — RAMELTEON 8 MG: 8 TABLET, FILM COATED ORAL at 10:08

## 2018-08-30 RX ADMIN — PROMETHAZINE HYDROCHLORIDE 12.5 MG: 25 INJECTION INTRAMUSCULAR; INTRAVENOUS at 05:08

## 2018-08-30 RX ADMIN — PROMETHAZINE HYDROCHLORIDE 12.5 MG: 25 INJECTION INTRAMUSCULAR; INTRAVENOUS at 09:08

## 2018-08-30 RX ADMIN — RAMELTEON 8 MG: 8 TABLET, FILM COATED ORAL at 01:08

## 2018-08-30 RX ADMIN — CEFTRIAXONE 1 G: 1 INJECTION, SOLUTION INTRAVENOUS at 08:08

## 2018-08-30 RX ADMIN — BUPROPION HYDROCHLORIDE 150 MG: 150 TABLET, FILM COATED, EXTENDED RELEASE ORAL at 10:08

## 2018-08-30 RX ADMIN — Medication 0.5 MG: at 05:08

## 2018-08-30 RX ADMIN — PROMETHAZINE HYDROCHLORIDE 12.5 MG: 25 INJECTION INTRAMUSCULAR; INTRAVENOUS at 11:08

## 2018-08-30 RX ADMIN — Medication 0.5 MG: at 01:08

## 2018-08-30 RX ADMIN — ACETAMINOPHEN 1000 MG: 10 INJECTION, SOLUTION INTRAVENOUS at 05:08

## 2018-08-30 RX ADMIN — PROMETHAZINE HYDROCHLORIDE 12.5 MG: 25 INJECTION INTRAMUSCULAR; INTRAVENOUS at 03:08

## 2018-08-30 RX ADMIN — Medication 0.5 MG: at 10:08

## 2018-08-30 RX ADMIN — ACETAMINOPHEN 1000 MG: 10 INJECTION, SOLUTION INTRAVENOUS at 01:08

## 2018-08-30 RX ADMIN — Medication 0.5 MG: at 09:08

## 2018-08-30 NOTE — PLAN OF CARE
Problem: Patient Care Overview  Goal: Individualization & Mutuality  AAOx4.  IVF infusing per order. IV antibiotics infused per order. VSS. Remains afebrile throughout my shift. Patient remains fall free throughout my shift. Comfort level established. Good pain control with prn medications. Bed low, brakes locked, SR up x2, call light within reach. Will continue to monitor.

## 2018-08-30 NOTE — ASSESSMENT & PLAN NOTE
Due to UTI  IV Rocephin  Blood and urine cultures collected  Trend lactate  IVF Hydration  Monitor on telemetry  Monitor labs and VS

## 2018-08-30 NOTE — ED NOTES
Assumed care :  Patient awake, alert and oriented x 3, skin warm and dry, in NAD with family at bedside.    Patient identifiers for Natividad Acosta checked and correct.  LOC:  Patient is awake, alert, and aware of environment with an appropriate affect. Patient is oriented x 3 and speaking appropriately.  APPEARANCE:  Patient resting comfortably and in no acute distress. Patient is clean and well groomed, patient's clothing is properly fastened.  SKIN:  The skin is warm and dry. Patient has normal skin turgor and moist mucus membranes. Skin is intact; no bruising or breakdown noted.  MUSCULOSKELETAL:  Patient is moving all extremities well, no obvious deformities noted. Pulses intact.   RESPIRATORY:  Airway is open and patent. Respirations are spontaneous and non-labored with normal effort and rate.  CARDIAC:  Patient has a normal rate and rhythm. No peripheral edema noted. Capillary refill < 3 seconds.  ABDOMEN:  No distention noted.  Soft and non-tender upon palpation.  Right flank pain with N&V  NEUROLOGICAL:  PERRL. Facial expression is symmetrical. Hand grasps are equal bilaterally. Normal sensation in all extremities when touched with finger.  Allergies reported:    Review of patient's allergies indicates:   Allergen Reactions    Stadol [butorphanol tartrate] Anaphylaxis    Demerol [meperidine]      vomiting    Levaquin [levofloxacin] Hives    Morphine      vomiting      Reglan [metoclopramide] Hives    Toradol [ketorolac] Other (See Comments)     Told not to take due to kidneys    Zofran [ondansetron hcl (pf)]     Iodine Rash     OTHER NOTES:  Patient CO right flank pain with nausea and vomiting.

## 2018-08-30 NOTE — ED PROVIDER NOTES
Dictation #1  MRN:[id number]  CSN:[id number]  Encounter Date: 2018    SCRIBE #1 NOTE: I, Cecelia Coats , am scribing for, and in the presence of, .       History     Chief Complaint   Patient presents with    Flank Pain     bilateral flank pain starting today worse on the right hx of kidney stones        Time seen by provider: 7:13 PM on 2018    Natividad Acosta is a 43 y.o. female who presents to the ED with flank pain onset 4 hours. Patient states that she is having flank pain on both sides that is greater on the right. She states that pain occasionally radiates toward her abdomen but also down her back. Patient complains of associated nausea and vomiting onset 1 hour as well as cloudy and dark urine. She endorses a history of kidney stones with need for stent placement, and explains she has had reconstruction of her right ureter that has caused subsequent narrowing. She is followed by Dr.Raju See and  for urology. Shx also includes hysterectomy, appendectomy, cholecystectomy, , and hernia repair. Patient denies any blood in her urine, painful urination, neck pain, chest pain, shortness of breath, vaginal complaints, diarrhea, or fever.       The history is provided by the patient. No  was used.     Review of patient's allergies indicates:   Allergen Reactions    Stadol [butorphanol tartrate] Anaphylaxis    Demerol [meperidine]      vomiting    Levaquin [levofloxacin] Hives    Morphine      vomiting      Reglan [metoclopramide] Hives    Toradol [ketorolac] Other (See Comments)     Told not to take due to kidneys    Zofran [ondansetron hcl (pf)]     Iodine Rash     Past Medical History:   Diagnosis Date    Kidney stone     Kidney stone     Renal disorder kidney stones     Past Surgical History:   Procedure Laterality Date    APPENDECTOMY       SECTION      CHOLECYSTECTOMY      HERNIA REPAIR Left 2017    HYSTERECTOMY       kidney stent      SHOULDER ARTHROSCOPY Right     TONSILLECTOMY      URETER SURGERY       No family history on file.  Social History     Tobacco Use    Smoking status: Current Every Day Smoker     Packs/day: 1.00    Smokeless tobacco: Never Used   Substance Use Topics    Alcohol use: Yes     Comment: occ    Drug use: No     Review of Systems   Constitutional: Negative for fever.   HENT: Negative for drooling and sore throat.    Eyes: Negative for photophobia and visual disturbance.   Respiratory: Negative for shortness of breath.    Cardiovascular: Negative for chest pain.   Gastrointestinal: Positive for abdominal pain, nausea and vomiting. Negative for blood in stool and diarrhea.   Genitourinary: Positive for flank pain. Negative for dysuria and hematuria.        +cloudy, dark urine.    Musculoskeletal: Positive for back pain.   Skin: Negative for rash.   Neurological: Negative for weakness and numbness.   Hematological: Does not bruise/bleed easily.       Physical Exam     Initial Vitals [08/29/18 1907]   BP Pulse Resp Temp SpO2   (!) 170/98 (!) 124 18 98.7 °F (37.1 °C) 98 %      MAP       --         Physical Exam    Nursing note and vitals reviewed.  Constitutional: She appears well-developed and well-nourished.  Non-toxic appearance. No distress.   In obvious pain.   HENT:   Head: Normocephalic and atraumatic.   Eyes: EOM are normal. Pupils are equal, round, and reactive to light.   Neck: Normal range of motion. Neck supple. No neck rigidity. No JVD present.   Cardiovascular: Regular rhythm, normal heart sounds and intact distal pulses. Exam reveals no gallop and no friction rub.    No murmur heard.  Tachycardic.   Pulmonary/Chest: Breath sounds normal. She has no wheezes. She has no rhonchi. She has no rales.   Abdominal: Soft. Bowel sounds are normal. She exhibits no distension. There is no tenderness. There is no rigidity, no rebound and no guarding.   Musculoskeletal: Normal range of motion. She  exhibits no tenderness.   Neurological: She is alert and oriented to person, place, and time. She has normal strength and normal reflexes. No cranial nerve deficit or sensory deficit. She exhibits normal muscle tone. Coordination normal. GCS eye subscore is 4. GCS verbal subscore is 5. GCS motor subscore is 6.   Skin: Skin is warm and dry.   Psychiatric: She has a normal mood and affect. Her speech is normal and behavior is normal. She is not actively hallucinating.         ED Course   Procedures  Labs Reviewed   CBC W/ AUTO DIFFERENTIAL - Abnormal; Notable for the following components:       Result Value    WBC 19.50 (*)     MCH 32.1 (*)     Platelets 434 (*)     MPV 7.6 (*)     Gran # (ANC) 15.1 (*)     Gran% 77.2 (*)     Lymph% 15.7 (*)     All other components within normal limits   URINALYSIS, REFLEX TO URINE CULTURE - Abnormal; Notable for the following components:    Occult Blood UA 3+ (*)     Nitrite, UA Positive (*)     Leukocytes, UA 1+ (*)     All other components within normal limits    Narrative:     Preferred Collection Type->Urine, Clean Catch   URINALYSIS MICROSCOPIC - Abnormal; Notable for the following components:    RBC, UA 40 (*)     WBC, UA 12 (*)     Bacteria, UA Moderate (*)     All other components within normal limits    Narrative:     Preferred Collection Type->Urine, Clean Catch   CULTURE, URINE   CULTURE, BLOOD   CULTURE, BLOOD   COMPREHENSIVE METABOLIC PANEL   LACTIC ACID, PLASMA   POCT URINE PREGNANCY          Imaging Results          CT Renal Stone Study ABD Pelvis WO (In process)                  Medical Decision Making:   History:   Old Medical Records: I decided to obtain old medical records.  Clinical Tests:   Lab Tests: Ordered and Reviewed  Radiological Study: Ordered and Reviewed    Natividad Acosta is a 43 y.o. female who presents to the Emergency Department       This patient does have evidence of infective focus- pyelonephritis  My overall impression is sepsis.  Patient  with nitrite positive urine with bacteria, 1 white blood cells and blood.  CT of the abdomen pelvis was obtained shows no evidence of ureteral stone or obstructive uropathy.  She does have a small right renal stone.  Patient is writhing in pain as if she was passing a kidney stone.  I do not detect etiology to her pain unless this is being caused by her urinary tract infection/pyelonephritis.  She will be admitted for pain control, IV antibiotics and she meets criteria for sepsis.  Discussed with the hospitalist who agrees to admit the patient.  The  Antibiotics given-   Antibiotics (From admission, onward)    Start     Stop Route Frequency Ordered    08/29/18 2145  cefTRIAXone (ROCEPHIN) 2 g in dextrose 5 % 50 mL IVPB  (Beta-Lactam WITHOUT resistance or severe beta-lactam allergy)      09/05 2144 IV Every 24 hours (non-standard times) 08/29/18 2040          Sev    Temp Readings from Last 1 Encounters:   08/29/18 98.7 °F (37.1 °C)     BP Readings from Last 1 Encounters:   08/29/18 111/65     Pulse Readings from Last 1 Encounters:   08/29/18 104                         Scribe Attestation:   Scribe #1: I performed the above scribed service and the documentation accurately describes the services I performed. I attest to the accuracy of the note.    I, Victorino Adhikari, personally performed the services described in this documentation. All medical record entries made by the scribe were at my direction and in my presence.  I have reviewed the chart and agree that the record reflects my personal performance and is accurate and complete. Chirag Gleason MD.  9:17 PM 08/29/2018             Clinical Impression:   The primary encounter diagnosis was Sepsis, due to unspecified organism. A diagnosis of Pyelonephritis was also pertinent to this visit.                             Chirag Gleason MD  08/29/18 2125       Chirag Gleason MD  08/29/18 2125

## 2018-08-30 NOTE — SUBJECTIVE & OBJECTIVE
Interval History: The pt had severe pain overnight and vomited several times. She is better this AM and tolerated food.    Review of Systems   Respiratory: Negative.    Cardiovascular: Negative.    Gastrointestinal: Negative.    Genitourinary: Positive for flank pain.   Skin: Negative.    Neurological: Negative.    Psychiatric/Behavioral: Negative.      Objective:     Vital Signs (Most Recent):  Temp: 98.3 °F (36.8 °C) (08/30/18 0758)  Pulse: 91 (08/30/18 0800)  Resp: 18 (08/30/18 0800)  BP: (!) 95/56 (08/30/18 0758)  SpO2: 99 % (08/30/18 0800) Vital Signs (24h Range):  Temp:  [98 °F (36.7 °C)-98.8 °F (37.1 °C)] 98.3 °F (36.8 °C)  Pulse:  [] 91  Resp:  [16-20] 18  SpO2:  [94 %-100 %] 99 %  BP: ()/() 95/56     Weight: 61.2 kg (135 lb)  Body mass index is 25.51 kg/m².    Intake/Output Summary (Last 24 hours) at 8/30/2018 1017  Last data filed at 8/30/2018 0600  Gross per 24 hour   Intake 350 ml   Output 275 ml   Net 75 ml      Physical Exam   Constitutional: She is oriented to person, place, and time. She appears well-developed and well-nourished.   HENT:   Head: Normocephalic and atraumatic.   Neck: Neck supple.   Cardiovascular: Normal rate, regular rhythm, normal heart sounds and intact distal pulses.   Pulmonary/Chest: Effort normal and breath sounds normal.   Abdominal: Soft. Bowel sounds are normal.   Right flank tenderness   Musculoskeletal: Normal range of motion.   Neurological: She is alert and oriented to person, place, and time.   Skin: Skin is warm and dry.   Psychiatric: She has a normal mood and affect.   Vitals reviewed.      Significant Labs:   CBC:   Recent Labs   Lab  08/29/18 1928 08/30/18   0547   WBC  19.50*  10.20   HGB  15.1  12.4   HCT  45.2  36.6*   PLT  434*  303     CMP:   Recent Labs   Lab  08/29/18 1928 08/30/18   0547   NA  136  135*   K  3.6  3.9   CL  98  104   CO2  25  24   GLU  99  99   BUN  12  11   CREATININE  0.8  0.7   CALCIUM  9.6  8.0*   PROT  8.3  5.8*    ALBUMIN  4.3  3.0*   BILITOT  0.5  0.4   ALKPHOS  96  67   AST  14  11   ALT  12  9*   ANIONGAP  13  7*   EGFRNONAA  >60  >60       Significant Imaging: I have reviewed all pertinent imaging results/findings within the past 24 hours.

## 2018-08-30 NOTE — SUBJECTIVE & OBJECTIVE
Past Medical History:   Diagnosis Date    Kidney stone     Kidney stone     Renal disorder kidney stones       Past Surgical History:   Procedure Laterality Date    APPENDECTOMY       SECTION      CHOLECYSTECTOMY      HERNIA REPAIR Left 2017    HYSTERECTOMY      kidney stent      SHOULDER ARTHROSCOPY Right     TONSILLECTOMY      URETER SURGERY         Review of patient's allergies indicates:   Allergen Reactions    Stadol [butorphanol tartrate] Anaphylaxis    Demerol [meperidine]      vomiting    Levaquin [levofloxacin] Hives    Morphine      vomiting      Reglan [metoclopramide] Hives    Toradol [ketorolac] Other (See Comments)     Told not to take due to kidneys    Zofran [ondansetron hcl (pf)]     Iodine Rash       No current facility-administered medications on file prior to encounter.      Current Outpatient Medications on File Prior to Encounter   Medication Sig    buPROPion (WELLBUTRIN SR) 150 MG TBSR 12 hr tablet TAKE 1 TABLET(150 MG) BY MOUTH TWICE DAILY    ESTRADIOL CYPIONATE IM Inject into the muscle every 30 days.    [DISCONTINUED] acetaminophen (TYLENOL) 650 MG TbSR Take 1 tablet (650 mg total) by mouth every 8 (eight) hours.    [DISCONTINUED] multivitamin (ONE DAILY MULTIVITAMIN) per tablet Take 1 tablet by mouth once daily.    [DISCONTINUED] oxyCODONE-acetaminophen (PERCOCET) 5-325 mg per tablet Take 1 tablet by mouth every 4 (four) hours as needed for Pain.    [DISCONTINUED] promethazine (PHENERGAN) 25 MG tablet Take 1 tablet (25 mg total) by mouth every 6 (six) hours as needed for Nausea.     Family History     Problem Relation (Age of Onset)    Diabetes Father    No Known Problems Mother        Tobacco Use    Smoking status: Current Every Day Smoker     Packs/day: 1.00    Smokeless tobacco: Never Used   Substance and Sexual Activity    Alcohol use: Yes     Comment: occ    Drug use: No    Sexual activity: Not on file     Review of Systems    Constitutional: Negative for chills and fever.   HENT: Negative for congestion.    Eyes: Negative for visual disturbance.   Respiratory: Negative for shortness of breath.    Cardiovascular: Negative for chest pain and palpitations.   Gastrointestinal: Positive for abdominal pain and nausea. Negative for diarrhea and vomiting.   Genitourinary: Positive for flank pain. Negative for dysuria and hematuria.        Urine cloudy and dark   Musculoskeletal: Positive for arthralgias.   Skin: Negative for wound.   Neurological: Negative for dizziness, syncope and headaches.   Psychiatric/Behavioral: Negative for confusion and hallucinations.     Objective:     Vital Signs (Most Recent):  Temp: 98.8 °F (37.1 °C) (08/29/18 2204)  Pulse: 104 (08/29/18 2204)  Resp: 20 (08/29/18 2204)  BP: 109/60 (08/29/18 2204)  SpO2: (!) 94 % (08/29/18 2204) Vital Signs (24h Range):  Temp:  [98.7 °F (37.1 °C)-98.8 °F (37.1 °C)] 98.8 °F (37.1 °C)  Pulse:  [104-139] 104  Resp:  [18-20] 20  SpO2:  [94 %-100 %] 94 %  BP: (105-170)/() 109/60     Weight: 61.2 kg (135 lb)  Body mass index is 25.51 kg/m².    Physical Exam   Constitutional: She is oriented to person, place, and time. She appears distressed.   Appears uncomfortable and mildly distressed. Squirming in bed due to pain.   HENT:   Head: Normocephalic.   Eyes: Pupils are equal, round, and reactive to light.   Neck: Normal range of motion. Neck supple. No JVD present. No tracheal deviation present.   Cardiovascular: Regular rhythm, normal heart sounds and intact distal pulses. Tachycardia present.   No murmur heard.  Pulmonary/Chest: Effort normal and breath sounds normal. No respiratory distress.   Abdominal: Soft. Bowel sounds are normal. She exhibits no distension. There is no tenderness. There is no guarding.   Musculoskeletal: Normal range of motion. She exhibits no edema.   Neurological: She is alert and oriented to person, place, and time. No cranial nerve deficit.   Skin: Skin  is warm, dry and intact. Capillary refill takes less than 2 seconds.   Psychiatric: She has a normal mood and affect. Her behavior is normal. Judgment and thought content normal.         CRANIAL NERVES     CN III, IV, VI   Pupils are equal, round, and reactive to light.       Significant Labs:   CBC:   Recent Labs   Lab  08/29/18 1928   WBC  19.50*   HGB  15.1   HCT  45.2   PLT  434*     CMP:   Recent Labs   Lab  08/29/18 1928   NA  136   K  3.6   CL  98   CO2  25   GLU  99   BUN  12   CREATININE  0.8   CALCIUM  9.6   PROT  8.3   ALBUMIN  4.3   BILITOT  0.5   ALKPHOS  96   AST  14   ALT  12   ANIONGAP  13   EGFRNONAA  >60     Lactic Acid:   Recent Labs   Lab  08/29/18 2053   LACTATE  1.0     Urine Studies:   Recent Labs   Lab  08/29/18 2006   COLORU  Yellow   APPEARANCEUA  Clear   PHUR  6.0   SPECGRAV  1.015   PROTEINUA  Negative   GLUCUA  Negative   KETONESU  Negative   BILIRUBINUA  Negative   OCCULTUA  3+*   NITRITE  Positive*   UROBILINOGEN  Negative   LEUKOCYTESUR  1+*   RBCUA  40*   WBCUA  12*   BACTERIA  Moderate*   SQUAMEPITHEL  5     Serum PG: negative    Microbiology Results (last 7 days)     Procedure Component Value Units Date/Time    Urine culture [[id number]]     Order Status:  No result Specimen:  Urine, Clean Catch     Blood culture [[id number]] Collected:  08/29/18 2053    Order Status:  Sent Specimen:  Blood Updated:  08/29/18 2053    Blood culture [[id number]] Collected:  08/29/18 2053    Order Status:  Sent Specimen:  Blood Updated:  08/29/18 2053    Urine culture [[id number]] Collected:  08/29/18 2006    Order Status:  No result Specimen:  Urine Updated:  08/29/18 2028            Significant Imaging:     CT Scan Abd/Pelvis Renal Stone Protocol: Radiologist's report not downloaded.

## 2018-08-30 NOTE — PROGRESS NOTES
Pt arrived to floor via W/C. No distress noted. Transferred to bed independently. Will continue to monitor.

## 2018-08-30 NOTE — PROGRESS NOTES
[facility] Hospital Medicine  Progress Note    Patient Name: Natividad Acosta  MRN: [id number]  Patient Class: IP- Inpatient   Admission Date: 8/29/2018  Length of Stay: 1 days  Attending Physician: Zina Wing MD  Primary Care Provider: Juan Swartz MD (Inactive)        Subjective:     Principal Problem:Sepsis    HPI:  Ms. Acosta is a 44yo with a PMH of ureteral stricture, ureteral stones, and current Smoker. She has an extensive  history including nephrostomy tubes at one time, ureteral stents, and right ureteral reconstruction. She presents to the ED today with c/o bilateral flank pain that started today. It feels the same as when she had a kidney stone. The right flank pain is worst than the left and it radiates down the back of her right thigh and to her right abdomen. While in the ED, and abdominal CT was done and showed no evidence of ureteral stone or obstructive uropathy. Her UA was (+) UTI and she met sepsis criteria. IV Rocephin was initiated and blood cultures were collected. She currently c/o nausea and flank pain.        Hospital Course:  No notes on file    Interval History: The pt had severe pain overnight and vomited several times. She is better this AM and tolerated food.    Review of Systems   Respiratory: Negative.    Cardiovascular: Negative.    Gastrointestinal: Negative.    Genitourinary: Positive for flank pain.   Skin: Negative.    Neurological: Negative.    Psychiatric/Behavioral: Negative.      Objective:     Vital Signs (Most Recent):  Temp: 98.3 °F (36.8 °C) (08/30/18 0758)  Pulse: 91 (08/30/18 0800)  Resp: 18 (08/30/18 0800)  BP: (!) 95/56 (08/30/18 0758)  SpO2: 99 % (08/30/18 0800) Vital Signs (24h Range):  Temp:  [98 °F (36.7 °C)-98.8 °F (37.1 °C)] 98.3 °F (36.8 °C)  Pulse:  [] 91  Resp:  [16-20] 18  SpO2:  [94 %-100 %] 99 %  BP: ()/() 95/56     Weight: 61.2 kg (135 lb)  Body mass index is 25.51 kg/m².    Intake/Output Summary  (Last 24 hours) at 8/30/2018 1017  Last data filed at 8/30/2018 0600  Gross per 24 hour   Intake 350 ml   Output 275 ml   Net 75 ml      Physical Exam   Constitutional: She is oriented to person, place, and time. She appears well-developed and well-nourished.   HENT:   Head: Normocephalic and atraumatic.   Neck: Neck supple.   Cardiovascular: Normal rate, regular rhythm, normal heart sounds and intact distal pulses.   Pulmonary/Chest: Effort normal and breath sounds normal.   Abdominal: Soft. Bowel sounds are normal.   Right flank tenderness   Musculoskeletal: Normal range of motion.   Neurological: She is alert and oriented to person, place, and time.   Skin: Skin is warm and dry.   Psychiatric: She has a normal mood and affect.   Vitals reviewed.      Significant Labs:   CBC:   Recent Labs   Lab  08/29/18 1928  08/30/18   0547   WBC  19.50*  10.20   HGB  15.1  12.4   HCT  45.2  36.6*   PLT  434*  303     CMP:   Recent Labs   Lab  08/29/18 1928  08/30/18   0547   NA  136  135*   K  3.6  3.9   CL  98  104   CO2  25  24   GLU  99  99   BUN  12  11   CREATININE  0.8  0.7   CALCIUM  9.6  8.0*   PROT  8.3  5.8*   ALBUMIN  4.3  3.0*   BILITOT  0.5  0.4   ALKPHOS  96  67   AST  14  11   ALT  12  9*   ANIONGAP  13  7*   EGFRNONAA  >60  >60       Significant Imaging: I have reviewed all pertinent imaging results/findings within the past 24 hours.    Assessment/Plan:      * Sepsis    -resolved after tx with IV Abx and IV fluids  -CT shows a nonobstructing stone          Urinary tract infection with hematuria    Continue IV fluids and Abx  Possible d/c tomorrow          Nephrolithiasis    The pt has had multiple procedures in the past          Tobacco abuse    Smoking cessation encouraged  Nicotine patch            VTE Risk Mitigation (From admission, onward)        Ordered     IP VTE LOW RISK PATIENT  Once      08/29/18 3306              Zina Wing MD  Department of Hospital Medicine   [facility]

## 2018-08-30 NOTE — H&P
[facility] Medicine  History & Physical    Patient Name: Natividad Acosta  MRN: [id number]  Admission Date: 2018  Attending Physician: Abhi Bermudez MD   Primary Care Provider: Juan Swartz MD (Inactive)         Patient information was obtained from patient and ER records.     Subjective:     Principal Problem:Sepsis    Chief Complaint:   Chief Complaint   Patient presents with    Flank Pain     bilateral flank pain starting today worse on the right hx of kidney stones         HPI: Ms. Acosta is a 42yo with a PMH of ureteral stricture, ureteral stones, and current Smoker. She has an extensive  history including nephrostomy tubes at one time, ureteral stents, and right ureteral reconstruction. She presents to the ED today with c/o bilateral flank pain that started today. It feels the same as when she had a kidney stone. The right flank pain is worst than the left and it radiates down the back of her right thigh and to her right abdomen. While in the ED, and abdominal CT was done and showed no evidence of ureteral stone or obstructive uropathy. Her UA was (+) UTI and she met sepsis criteria. IV Rocephin was initiated and blood cultures were collected. She currently c/o nausea and flank pain.        Past Medical History:   Diagnosis Date    Kidney stone     Kidney stone     Renal disorder kidney stones       Past Surgical History:   Procedure Laterality Date    APPENDECTOMY       SECTION      CHOLECYSTECTOMY      HERNIA REPAIR Left 2017    HYSTERECTOMY      kidney stent      SHOULDER ARTHROSCOPY Right     TONSILLECTOMY      URETER SURGERY         Review of patient's allergies indicates:   Allergen Reactions    Stadol [butorphanol tartrate] Anaphylaxis    Demerol [meperidine]      vomiting    Levaquin [levofloxacin] Hives    Morphine      vomiting      Reglan [metoclopramide] Hives    Toradol [ketorolac] Other (See Comments)     Told not to  take due to kidneys    Zofran [ondansetron hcl (pf)]     Iodine Rash       No current facility-administered medications on file prior to encounter.      Current Outpatient Medications on File Prior to Encounter   Medication Sig    buPROPion (WELLBUTRIN SR) 150 MG TBSR 12 hr tablet TAKE 1 TABLET(150 MG) BY MOUTH TWICE DAILY    ESTRADIOL CYPIONATE IM Inject into the muscle every 30 days.    [DISCONTINUED] acetaminophen (TYLENOL) 650 MG TbSR Take 1 tablet (650 mg total) by mouth every 8 (eight) hours.    [DISCONTINUED] multivitamin (ONE DAILY MULTIVITAMIN) per tablet Take 1 tablet by mouth once daily.    [DISCONTINUED] oxyCODONE-acetaminophen (PERCOCET) 5-325 mg per tablet Take 1 tablet by mouth every 4 (four) hours as needed for Pain.    [DISCONTINUED] promethazine (PHENERGAN) 25 MG tablet Take 1 tablet (25 mg total) by mouth every 6 (six) hours as needed for Nausea.     Family History     Problem Relation (Age of Onset)    Mother: Alive  Father: Allive     Diabetes Father    No Known Problems Mother        Tobacco Use    Smoking status: Current Every Day Smoker     Packs/day: 1.00    Smokeless tobacco: Never Used   Substance and Sexual Activity    Alcohol use: Yes     Comment: occ    Drug use: No    Sexual activity: Not on file     Review of Systems   Constitutional: Negative for chills and fever.   HENT: Negative for congestion.    Eyes: Negative for visual disturbance.   Respiratory: Negative for shortness of breath.    Cardiovascular: Negative for chest pain and palpitations.   Gastrointestinal: Positive for abdominal pain and nausea. Negative for diarrhea and vomiting.   Genitourinary: Positive for flank pain. Negative for dysuria and hematuria.        Urine cloudy and dark   Musculoskeletal: Positive for arthralgias.   Skin: Negative for wound.   Neurological: Negative for dizziness, syncope and headaches.   Psychiatric/Behavioral: Negative for confusion and hallucinations.     Objective:     Vital  Signs (Most Recent):  Temp: 98.8 °F (37.1 °C) (08/29/18 2204)  Pulse: 104 (08/29/18 2204)  Resp: 20 (08/29/18 2204)  BP: 109/60 (08/29/18 2204)  SpO2: (!) 94 % (08/29/18 2204) Vital Signs (24h Range):  Temp:  [98.7 °F (37.1 °C)-98.8 °F (37.1 °C)] 98.8 °F (37.1 °C)  Pulse:  [104-139] 104  Resp:  [18-20] 20  SpO2:  [94 %-100 %] 94 %  BP: (105-170)/() 109/60     Weight: 61.2 kg (135 lb)  Body mass index is 25.51 kg/m².    Physical Exam   Constitutional: She is oriented to person, place, and time. She appears distressed.   Appears uncomfortable and mildly distressed. Squirming in bed due to pain.   HENT:   Head: Normocephalic.   Eyes: Pupils are equal, round, and reactive to light.   Neck: Normal range of motion. Neck supple. No JVD present. No tracheal deviation present.   Cardiovascular: Regular rhythm, normal heart sounds and intact distal pulses. Tachycardia present.   No murmur heard.  Pulmonary/Chest: Effort normal and breath sounds normal. No respiratory distress.   Abdominal: Soft. Bowel sounds are normal. She exhibits no distension. There is no tenderness. There is no guarding.   Musculoskeletal: Normal range of motion. She exhibits no edema.   Neurological: She is alert and oriented to person, place, and time. No cranial nerve deficit.   Skin: Skin is warm, dry and intact. Capillary refill takes less than 2 seconds.   Psychiatric: She has a normal mood and affect. Her behavior is normal. Judgment and thought content normal.         CRANIAL NERVES     CN III, IV, VI   Pupils are equal, round, and reactive to light.       Significant Labs:   CBC:   Recent Labs   Lab  08/29/18 1928   WBC  19.50*   HGB  15.1   HCT  45.2   PLT  434*     CMP:   Recent Labs   Lab  08/29/18 1928   NA  136   K  3.6   CL  98   CO2  25   GLU  99   BUN  12   CREATININE  0.8   CALCIUM  9.6   PROT  8.3   ALBUMIN  4.3   BILITOT  0.5   ALKPHOS  96   AST  14   ALT  12   ANIONGAP  13   EGFRNONAA  >60     Lactic Acid:   Recent Labs    Lab  08/29/18 2053   LACTATE  1.0     Urine Studies:   Recent Labs   Lab  08/29/18 2006   COLORU  Yellow   APPEARANCEUA  Clear   PHUR  6.0   SPECGRAV  1.015   PROTEINUA  Negative   GLUCUA  Negative   KETONESU  Negative   BILIRUBINUA  Negative   OCCULTUA  3+*   NITRITE  Positive*   UROBILINOGEN  Negative   LEUKOCYTESUR  1+*   RBCUA  40*   WBCUA  12*   BACTERIA  Moderate*   SQUAMEPITHEL  5     Serum PG: negative    Microbiology Results (last 7 days)     Procedure Component Value Units Date/Time    Urine culture [[id number]]     Order Status:  No result Specimen:  Urine, Clean Catch     Blood culture [[id number]] Collected:  08/29/18 2053    Order Status:  Sent Specimen:  Blood Updated:  08/29/18 2053    Blood culture [[id number]] Collected:  08/29/18 2053    Order Status:  Sent Specimen:  Blood Updated:  08/29/18 2053    Urine culture [[id number]] Collected:  08/29/18 2006    Order Status:  No result Specimen:  Urine Updated:  08/29/18 2028            Significant Imaging:     CT Scan Abd/Pelvis Renal Stone Protocol: Radiologist's report not downloaded.    Assessment/Plan:     * Sepsis    Due to UTI  IV Rocephin  Blood and urine cultures collected  Trend lactate  IVF Hydration  Monitor on telemetry  Monitor labs and VS          Urinary tract infection with hematuria    IV Rocephin  Urine culture collected  IV pain management and Anti-emetics          Tobacco abuse    Smoking cessation encouraged  Nicotine patch            VTE Risk Mitigation (From admission, onward)        Ordered     IP VTE LOW RISK PATIENT  Once      08/29/18 2209             Ivory Melissa NP  Department of Hospital Medicine   [facility]

## 2018-08-30 NOTE — PLAN OF CARE
08/30/18 0800   PRE-TX-O2-ETCO2   O2 Device (Oxygen Therapy) room air   SpO2 99 %   Pulse Oximetry Type Intermittent   $ Pulse Oximetry - Multiple Charge Pulse Oximetry - Multiple   Pulse 91   Resp 18

## 2018-08-30 NOTE — HPI
Ms. Acosta is a 44yo with a PMH of ureteral stricture, ureteral stones, and current Smoker. She has an extensive  history including nephrostomy tubes at one time, ureteral stents, and right ureteral reconstruction. She presents to the ED today with c/o bilateral flank pain that started today. It feels the same as when she had a kidney stone. The right flank pain is worst than the left and it radiates down the back of her right thigh and to her right abdomen. While in the ED, and abdominal CT was done and showed no evidence of ureteral stone or obstructive uropathy. Her UA was (+) UTI and she met sepsis criteria. IV Rocephin was initiated and blood cultures were collected. She currently c/o nausea and flank pain.

## 2018-08-30 NOTE — PLAN OF CARE
Met with patient at bedside.  Resides at home with daughter (17 years old).  Pharmacy is [facility]  Reports had medicaid in past, and is pending renewal.  Contact information posted on white board. DEBBY Mike RN      08/30/18 0326   Discharge Assessment   Assessment Type Discharge Planning Assessment   Confirmed/corrected address and phone number on facesheet? Yes   Assessment information obtained from? Patient   Expected Length of Stay (days) 2   Communicated expected length of stay with patient/caregiver yes   Prior to hospitilization cognitive status: Alert/Oriented   Prior to hospitalization functional status: Independent   Current cognitive status: Alert/Oriented   Current Functional Status: Independent   Lives With child(sudhakar), dependent   Able to Return to Prior Arrangements yes   Is patient able to care for self after discharge? Yes   Patient's perception of discharge disposition home or selfcare   Readmission Within The Last 30 Days no previous admission in last 30 days   Patient currently being followed by outpatient case management? No   Patient currently receives any other outside agency services? No   Does the patient receive services at the [facility]? No   Discharge Plan A Home   Discharge Plan B Home Health

## 2018-08-31 VITALS
WEIGHT: 135 LBS | OXYGEN SATURATION: 100 % | HEART RATE: 81 BPM | BODY MASS INDEX: 25.49 KG/M2 | SYSTOLIC BLOOD PRESSURE: 115 MMHG | HEIGHT: 61 IN | RESPIRATION RATE: 18 BRPM | DIASTOLIC BLOOD PRESSURE: 56 MMHG | TEMPERATURE: 98 F

## 2018-08-31 PROCEDURE — 25000003 PHARM REV CODE 250: Performed by: NURSE PRACTITIONER

## 2018-08-31 RX ORDER — CEPHALEXIN 500 MG/1
500 CAPSULE ORAL EVERY 6 HOURS
Qty: 28 CAPSULE | Refills: 0 | Status: SHIPPED | OUTPATIENT
Start: 2018-08-31 | End: 2018-09-07

## 2018-08-31 RX ORDER — OXYCODONE AND ACETAMINOPHEN 7.5; 325 MG/1; MG/1
1 TABLET ORAL EVERY 6 HOURS PRN
Status: DISCONTINUED | OUTPATIENT
Start: 2018-08-31 | End: 2018-08-31 | Stop reason: HOSPADM

## 2018-08-31 RX ORDER — OXYCODONE AND ACETAMINOPHEN 7.5; 325 MG/1; MG/1
1 TABLET ORAL EVERY 6 HOURS PRN
Qty: 20 TABLET | Refills: 0 | Status: SHIPPED | OUTPATIENT
Start: 2018-08-31 | End: 2018-09-05

## 2018-08-31 RX ORDER — PROMETHAZINE HYDROCHLORIDE 25 MG/1
25 TABLET ORAL EVERY 6 HOURS PRN
Qty: 14 TABLET | Refills: 0 | Status: SHIPPED | OUTPATIENT
Start: 2018-08-31 | End: 2022-07-25 | Stop reason: SDUPTHER

## 2018-08-31 RX ADMIN — OXYCODONE HYDROCHLORIDE AND ACETAMINOPHEN 1 TABLET: 7.5; 325 TABLET ORAL at 11:08

## 2018-08-31 RX ADMIN — Medication 0.5 MG: at 08:08

## 2018-08-31 RX ADMIN — Medication 0.5 MG: at 04:08

## 2018-08-31 RX ADMIN — BUPROPION HYDROCHLORIDE 150 MG: 150 TABLET, FILM COATED, EXTENDED RELEASE ORAL at 08:08

## 2018-08-31 RX ADMIN — OXYCODONE HYDROCHLORIDE AND ACETAMINOPHEN 1 TABLET: 7.5; 325 TABLET ORAL at 02:08

## 2018-08-31 NOTE — PLAN OF CARE
Problem: Patient Care Overview  Goal: Plan of Care Review  Outcome: Ongoing (interventions implemented as appropriate)  AAOx4. Plan of care reviewed with patient. Safety maintained throughout shift. Bed in low and locked position, side rails up x2, call light within reach. Repositions independently. Up to restroom without difficulty. Non-skid socks when out of bed. IV antibiotics infused per orders. PRN medication given for c/o pain and nausea. Hourly/q2 rounding utilized for safety. Will continue to monitor.

## 2018-08-31 NOTE — HOSPITAL COURSE
42 y/o admitted with pyelonephritis. She was tx with IV Abx and fluids. She feels much better and will be discharged home.  U Cx is growing gram negative bacteria. ID and susceptibilities are pending.    CT of abd revealed a non obstructing renal stone.

## 2018-08-31 NOTE — DISCHARGE SUMMARY
[facility] Medicine  Discharge Summary      Patient Name: Natividad Acosta  MRN: [id number]  Admission Date: 8/29/2018  Hospital Length of Stay: 2 days  Discharge Date and Time:  08/31/2018 11:21 AM  Attending Physician: Zina Wing MD   Discharging Provider: Zina Wing MD  Primary Care Provider: Juan Swartz MD (Inactive)      HPI:   Ms. Acosta is a 44yo with a PMH of ureteral stricture, ureteral stones, and current Smoker. She has an extensive  history including nephrostomy tubes at one time, ureteral stents, and right ureteral reconstruction. She presents to the ED today with c/o bilateral flank pain that started today. It feels the same as when she had a kidney stone. The right flank pain is worst than the left and it radiates down the back of her right thigh and to her right abdomen. While in the ED, and abdominal CT was done and showed no evidence of ureteral stone or obstructive uropathy. Her UA was (+) UTI and she met sepsis criteria. IV Rocephin was initiated and blood cultures were collected. She currently c/o nausea and flank pain.        * No surgery found *      Hospital Course:   44 y/o admitted with pyelonephritis. She was tx with IV Abx and fluids. She feels much better and will be discharged home.  U Cx is growing gram negative bacteria. ID and susceptibilities are pending.    CT of abd revealed a non obstructing renal stone.     Consults:   Consults (From admission, onward)        Status Ordering Provider     IP consult to case management  Once     Provider:  (Not yet assigned)    Acknowledged JOVANNA KUHN          No new Assessment & Plan notes have been filed under this hospital service since the last note was generated.  Service: Hospital Medicine    Final Active Diagnoses:    Diagnosis Date Noted POA    PRINCIPAL PROBLEM:  Sepsis [A41.9] 08/29/2018 Yes    Urinary tract infection with hematuria [N39.0, R31.9] 08/29/2018 Yes     Nephrolithiasis [N20.0] 08/30/2018 Yes    Tobacco abuse [Z72.0] 08/29/2018 Yes      Problems Resolved During this Admission:       Discharged Condition: stable    Disposition: Home or Self Care    Follow Up:  Follow-up Information     Juan Swartz MD In 1 week.    Specialty:  Family Medicine  Contact information:  [address] [facility]  [id number]  [place] [id number]  [phone]                 Patient Instructions:      Diet Adult Regular     Activity as tolerated       Significant Diagnostic Studies: Labs:   BMP:   Recent Labs   Lab  08/29/18 1928 08/30/18   0547   GLU  99  99   NA  136  135*   K  3.6  3.9   CL  98  104   CO2  25  24   BUN  12  11   CREATININE  0.8  0.7   CALCIUM  9.6  8.0*   MG   --   1.7    and CBC   Recent Labs   Lab  08/29/18 1928 08/30/18   0547   WBC  19.50*  10.20   HGB  15.1  12.4   HCT  45.2  36.6*   PLT  434*  303       Pending Diagnostic Studies:     None         Medications:  Reconciled Home Medications:      Medication List      START taking these medications    cephALEXin 500 MG capsule  Commonly known as:  KEFLEX  Take 1 capsule (500 mg total) by mouth every 6 (six) hours. for 7 days     oxyCODONE-acetaminophen 7.5-325 mg per tablet  Commonly known as:  PERCOCET  Take 1 tablet by mouth every 6 (six) hours as needed for Pain.     promethazine 25 MG tablet  Commonly known as:  PHENERGAN  Take 1 tablet (25 mg total) by mouth every 6 (six) hours as needed for Nausea.        CONTINUE taking these medications    buPROPion 150 MG TBSR 12 hr tablet  Commonly known as:  WELLBUTRIN SR  TAKE 1 TABLET(150 MG) BY MOUTH TWICE DAILY     ESTRADIOL CYPIONATE IM  Inject into the muscle every 30 days.            Indwelling Lines/Drains at time of discharge:   Lines/Drains/Airways          None          Time spent on the discharge of patient:30 minutes  Patient was seen and examined on the date of discharge and determined to be suitable for discharge.         Zina Wing MD  Department of  Hospital Medicine [facility]

## 2018-08-31 NOTE — NURSING
DC instructions given. New scripts given. Pt verbalized understanding. PIV removed. Catheter intact. Pt awaiting ride. Will monitor.

## 2018-08-31 NOTE — PHYSICIAN QUERY
PT Name: Natividad Acosta  MR #: [id number]    Physician Query Form - Emergency Medicine Diagnosis Clarification     CDS/: Aury Pandey RN                Contact information:[email or web address]  This form is a permanent document in the medical record.     Query Date: August 31, 2018    By submitting this query, we are merely seeking further clarification of documentation.  Please utilize your independent clinical judgment when addressing the question(s) below.      The Medical record contains the following:     Diagnosis Supporting Clinical Information Location in Medical Record   Pyelonephritis The primary encounter diagnosis was Sepsis, due to unspecified organism. A diagnosis of Pyelonephritis was also pertinent to this visit.     ED provider note 8/29     Do you agree with the Emergency Medicine diagnosis of __pyelonephritis__?    [  ] Yes  [  ] Yes, but it resolved prior to my assessment of the patient  [  ] No  [  ] Clinically Insignificant  [  ] Other/Clarification of Findings:_________________________________  [  ] Clinically Undetermined    Please document in your progress notes daily for the duration of treatment until resolved and include in your discharge summary.    Query withdrew, DC summary now available and documentation states 42 y/o admitted with pyelonephritis

## 2018-09-01 LAB — BACTERIA UR CULT: NORMAL

## 2018-09-04 LAB
BACTERIA BLD CULT: NORMAL
BACTERIA BLD CULT: NORMAL

## 2018-09-04 NOTE — PHYSICIAN QUERY
PT Name: Natividad Acosta  MR #: [id number]    Physician Query Form - Cause and Effect Relationship Clarification      CDS/: Aury Pandey RN                 Contact information:[email or web address]    This form is a permanent document in the medical record.     Query Date: September 4, 2018    By submitting this query, we are merely seeking further clarification of documentation. Please utilize your independent clinical judgment when addressing the question(s) below.    The Medical record contains the following:  Supporting Clinical Findings   Location in record   Sepsis                                                       Due to UTI  IV Rocephin  Blood and urine cultures collected  Trend lactate  IVF Hydration  Monitor on telemetry  Monitor labs and VS                                                                                             H&P 8/29   Urine Culture                                           KLEBSIELLA PNEUMONIAE   >100,000 cfu/ml                                                                                                      Microbiology 8/29         Provider, please clarify if there is any correlation between __sepsis__ and __klebsiella pneumoniae__.           Are the conditions:     [ x ] Due to or associated with each other     [  ] Unrelated to each other     [  ] Other (Please Specify): _________________________     [  ] Clinically Undetermined

## 2019-03-13 ENCOUNTER — CLINICAL SUPPORT (OUTPATIENT)
Dept: URGENT CARE | Facility: CLINIC | Age: 44
End: 2019-03-13

## 2019-03-13 ENCOUNTER — HOSPITAL ENCOUNTER (EMERGENCY)
Facility: HOSPITAL | Age: 44
Discharge: HOME | End: 2019-03-13
Attending: EMERGENCY MEDICINE

## 2019-03-13 VITALS
SYSTOLIC BLOOD PRESSURE: 138 MMHG | DIASTOLIC BLOOD PRESSURE: 65 MMHG | TEMPERATURE: 97 F | RESPIRATION RATE: 16 BRPM | HEART RATE: 91 BPM | BODY MASS INDEX: 24.37 KG/M2 | WEIGHT: 129 LBS | OXYGEN SATURATION: 95 %

## 2019-03-13 VITALS
TEMPERATURE: 97 F | SYSTOLIC BLOOD PRESSURE: 123 MMHG | BODY MASS INDEX: 25.89 KG/M2 | HEART RATE: 102 BPM | WEIGHT: 137 LBS | RESPIRATION RATE: 14 BRPM | DIASTOLIC BLOOD PRESSURE: 87 MMHG | OXYGEN SATURATION: 97 %

## 2019-03-13 DIAGNOSIS — R30.0 DYSURIA: ICD-10-CM

## 2019-03-13 DIAGNOSIS — M54.9 ACUTE RIGHT-SIDED BACK PAIN, UNSPECIFIED BACK LOCATION: ICD-10-CM

## 2019-03-13 DIAGNOSIS — R40.20 LOSS OF CONSCIOUSNESS: ICD-10-CM

## 2019-03-13 DIAGNOSIS — R55 SYNCOPE, UNSPECIFIED SYNCOPE TYPE: Primary | ICD-10-CM

## 2019-03-13 DIAGNOSIS — R10.9 RIGHT FLANK PAIN: Primary | ICD-10-CM

## 2019-03-13 LAB
ANION GAP SERPL CALC-SCNC: 13 MMOL/L
BASOPHILS # BLD AUTO: 0.1 K/UL
BASOPHILS NFR BLD: 0.4 %
BILIRUB UR QL STRIP: NEGATIVE
BUN SERPL-MCNC: 9 MG/DL
CALCIUM SERPL-MCNC: 9.9 MG/DL
CHLORIDE SERPL-SCNC: 98 MMOL/L
CLARITY UR: CLEAR
CO2 SERPL-SCNC: 26 MMOL/L
COLOR UR: YELLOW
CREAT SERPL-MCNC: 0.7 MG/DL
DIFFERENTIAL METHOD: ABNORMAL
EOSINOPHIL # BLD AUTO: 0.3 K/UL
EOSINOPHIL NFR BLD: 2 %
ERYTHROCYTE [DISTWIDTH] IN BLOOD BY AUTOMATED COUNT: 12.5 %
EST. GFR  (AFRICAN AMERICAN): >60 ML/MIN/1.73 M^2
EST. GFR  (NON AFRICAN AMERICAN): >60 ML/MIN/1.73 M^2
GLUCOSE SERPL-MCNC: 93 MG/DL
GLUCOSE UR QL STRIP: NEGATIVE
HCT VFR BLD AUTO: 41.7 %
HGB BLD-MCNC: 14 G/DL
HGB UR QL STRIP: NEGATIVE
KETONES UR QL STRIP: NEGATIVE
LEUKOCYTE ESTERASE UR QL STRIP: NEGATIVE
LYMPHOCYTES # BLD AUTO: 3.7 K/UL
LYMPHOCYTES NFR BLD: 24.7 %
MCH RBC QN AUTO: 32.5 PG
MCHC RBC AUTO-ENTMCNC: 33.5 G/DL
MCV RBC AUTO: 97 FL
MONOCYTES # BLD AUTO: 0.9 K/UL
MONOCYTES NFR BLD: 6.3 %
NEUTROPHILS # BLD AUTO: 10 K/UL
NEUTROPHILS NFR BLD: 66.6 %
NITRITE UR QL STRIP: NEGATIVE
PH UR STRIP: 7 [PH] (ref 5–8)
PLATELET # BLD AUTO: 407 K/UL
PMV BLD AUTO: 8 FL
POTASSIUM SERPL-SCNC: 3.4 MMOL/L
PROT UR QL STRIP: NEGATIVE
RBC # BLD AUTO: 4.31 M/UL
SODIUM SERPL-SCNC: 137 MMOL/L
SP GR UR STRIP: <=1.005 (ref 1–1.03)
URN SPEC COLLECT METH UR: ABNORMAL
UROBILINOGEN UR STRIP-ACNC: NEGATIVE EU/DL
WBC # BLD AUTO: 15.1 K/UL

## 2019-03-13 PROCEDURE — 96365 THER/PROPH/DIAG IV INF INIT: CPT

## 2019-03-13 PROCEDURE — 93005 ELECTROCARDIOGRAM TRACING: CPT

## 2019-03-13 PROCEDURE — 25000003 PHARM REV CODE 250: Performed by: EMERGENCY MEDICINE

## 2019-03-13 PROCEDURE — 36415 COLL VENOUS BLD VENIPUNCTURE: CPT

## 2019-03-13 PROCEDURE — 99204 PR OFFICE/OUTPT VISIT, NEW, LEVL IV, 45-59 MIN: ICD-10-PCS | Mod: S$GLB,,, | Performed by: NURSE PRACTITIONER

## 2019-03-13 PROCEDURE — 99204 OFFICE O/P NEW MOD 45 MIN: CPT | Mod: S$GLB,,, | Performed by: NURSE PRACTITIONER

## 2019-03-13 PROCEDURE — 85025 COMPLETE CBC W/AUTO DIFF WBC: CPT

## 2019-03-13 PROCEDURE — 80048 BASIC METABOLIC PNL TOTAL CA: CPT

## 2019-03-13 PROCEDURE — 96375 TX/PRO/DX INJ NEW DRUG ADDON: CPT

## 2019-03-13 PROCEDURE — 96361 HYDRATE IV INFUSION ADD-ON: CPT

## 2019-03-13 PROCEDURE — 81003 URINALYSIS AUTO W/O SCOPE: CPT

## 2019-03-13 PROCEDURE — 99285 EMERGENCY DEPT VISIT HI MDM: CPT | Mod: 25

## 2019-03-13 PROCEDURE — 63600175 PHARM REV CODE 636 W HCPCS: Performed by: EMERGENCY MEDICINE

## 2019-03-13 RX ORDER — HYDROMORPHONE HYDROCHLORIDE 2 MG/ML
1 INJECTION, SOLUTION INTRAMUSCULAR; INTRAVENOUS; SUBCUTANEOUS
Status: COMPLETED | OUTPATIENT
Start: 2019-03-13 | End: 2019-03-13

## 2019-03-13 RX ADMIN — SODIUM CHLORIDE, SODIUM LACTATE, POTASSIUM CHLORIDE, AND CALCIUM CHLORIDE 1000 ML: .6; .31; .03; .02 INJECTION, SOLUTION INTRAVENOUS at 04:03

## 2019-03-13 RX ADMIN — PROMETHAZINE HYDROCHLORIDE 25 MG: 25 INJECTION INTRAMUSCULAR; INTRAVENOUS at 04:03

## 2019-03-13 RX ADMIN — HYDROMORPHONE HYDROCHLORIDE 1 MG: 2 INJECTION INTRAMUSCULAR; INTRAVENOUS; SUBCUTANEOUS at 04:03

## 2019-03-13 NOTE — PROGRESS NOTES
"Subjective:       Patient ID: Natividad Acosta is a 43 y.o. female.    Vitals:  weight is 62.1 kg (137 lb). Her oral temperature is 97.3 °F (36.3 °C). Her blood pressure is 123/87 and her pulse is 102. Her respiration is 14 and oxygen saturation is 97%.     Chief Complaint: Fever    Patient complains of right flank pain that began 2 weeks ago. Describes pain as excruciating, but is relieved by percocet. Denies urinary symptoms.  Also complains of "passing out" 2 times today, witnessed. History of mitral valve prolapse and seizures. Patient also reports subjective fever that began a few days ago. Denies sob, chest pain, vomiting or diarrhea.           Fever    This is a new problem. The current episode started 1 to 4 weeks ago. Episode frequency: on and off for 2 weeks. Her temperature was unmeasured (felt warm ) prior to arrival. Temperature source: none, felt body warm  Associated symptoms include headaches, muscle aches and nausea. Pertinent negatives include no abdominal pain, chest pain, congestion, coughing, diarrhea, rash, sore throat, urinary pain or vomiting. Associated symptoms comments: uper back pain around kidney area, shakes and syncope( x 2, last night and this morning), weakness, seizure(x1,which she hasnt had an episode since young) , chest discomfort. She has tried acetaminophen (periceit , wellbutrin and aleve ) for the symptoms. The treatment provided no relief.       Constitution: Positive for fever. Negative for chills and fatigue.   HENT: Negative for congestion and sore throat.    Neck: Negative for painful lymph nodes.   Cardiovascular: Negative for chest pain and leg swelling.   Eyes: Negative for double vision and blurred vision.   Respiratory: Negative for cough and shortness of breath.    Gastrointestinal: Positive for nausea. Negative for abdominal pain, vomiting and diarrhea.   Endocrine: negative.   Genitourinary: Negative for dysuria, frequency, urgency and history of kidney " stones.   Musculoskeletal: Negative for joint pain, joint swelling, muscle cramps and muscle ache.   Skin: Negative for color change, pale, rash and bruising.   Allergic/Immunologic: Negative for seasonal allergies.   Neurological: Positive for passing out and headaches. Negative for dizziness, history of vertigo and light-headedness.   Hematologic/Lymphatic: Negative for swollen lymph nodes.   Psychiatric/Behavioral: Negative for nervous/anxious, sleep disturbance and depression. The patient is not nervous/anxious.        Objective:      Physical Exam   Constitutional: She is oriented to person, place, and time. Vital signs are normal. She appears well-developed and well-nourished. She is cooperative.  Non-toxic appearance. She does not have a sickly appearance. She does not appear ill. No distress.   HENT:   Head: Normocephalic and atraumatic.   Right Ear: Hearing, tympanic membrane, external ear and ear canal normal.   Left Ear: Hearing, tympanic membrane, external ear and ear canal normal.   Nose: Nose normal. No mucosal edema, rhinorrhea or nasal deformity. No epistaxis. Right sinus exhibits no maxillary sinus tenderness and no frontal sinus tenderness. Left sinus exhibits no maxillary sinus tenderness and no frontal sinus tenderness.   Mouth/Throat: Uvula is midline, oropharynx is clear and moist and mucous membranes are normal. No trismus in the jaw. Normal dentition. No uvula swelling. No posterior oropharyngeal erythema.   Eyes: Conjunctivae and lids are normal. Right eye exhibits no discharge. Left eye exhibits no discharge. No scleral icterus.   Sclera clear bilat   Neck: Trachea normal, normal range of motion, full passive range of motion without pain and phonation normal. Neck supple.   Cardiovascular: Normal rate, regular rhythm, normal heart sounds, intact distal pulses and normal pulses.   Pulmonary/Chest: Effort normal and breath sounds normal. No respiratory distress.   Abdominal: Soft. Normal  appearance and bowel sounds are normal. She exhibits no distension, no pulsatile midline mass and no mass. There is no tenderness.   Musculoskeletal: Normal range of motion. She exhibits no edema or deformity.   Lymphadenopathy:     She has no cervical adenopathy.   Neurological: She is alert and oriented to person, place, and time. She exhibits normal muscle tone. Coordination normal. GCS eye subscore is 4. GCS verbal subscore is 5. GCS motor subscore is 6.   Patient with tremors to right lower extremity.    Skin: Skin is warm, dry and intact. No rash noted. She is not diaphoretic. No pallor.   Psychiatric: She has a normal mood and affect. Her speech is normal and behavior is normal. Judgment and thought content normal. Cognition and memory are normal.   Nursing note and vitals reviewed.      Assessment:       1. Syncope, unspecified syncope type    2. Acute right-sided back pain, unspecified back location        Plan:       Advised patient that they need to go to the Emergency Room for further evaluation for syncope with a history of mitral valve prolapse and seizures.  Offered pt EMS transport to the hospital; however, the patient declined and will drive self or have family member drive to the ER. Pt is alert and oriented to person, place, and situation.  I explained, in layman's terms, the risks associated with private vehicle transport such as, but not limited to: respiratory arrest, cardiac arrest, and death. The patient voices these risks back to me. Patient is not altered and is not under the influence and is competent to make own decisions.     Syncope, unspecified syncope type    Acute right-sided back pain, unspecified back location

## 2019-03-13 NOTE — ED NOTES
Pt presents to ED with c/o 2 syncopal episodes over the past 2 days. Also states that she has nausea and rightt flank/lower back pain. Does states sx come and go.Pt evaluated by MD and ED work up in progress. No other needs are identified at this time. Will monitor prn.

## 2019-03-13 NOTE — ED PROVIDER NOTES
"Encounter Date: 3/13/2019    SCRIBE #1 NOTE: I, Kristin Hernandez , am scribing for, and in the presence of,  Dr. Zhang . I have scribed the entire note.       History     Chief Complaint   Patient presents with    Loss of Consciousness     passed out twice today. Sent from [place] urgent care for eval    Back Pain    Shortness of Breath       03/13/2019  4:17 PM       The patient is a 43 y.o. female who is presenting to the ED R sided flank pain that began several days ago. The pt reports that the pain waxes and wanes, cramping in nature, and moderate in severity. The pt denies any exacerbating or mitigating factors. Associated sxs include nausea, upper back pain, and multiple episodes of syncope since last evening. The pt endorses remote hx of seizures and states "she may of had one yesterday". In addition, she notes that she was informally diagnosed with MS "years ago" but denies having had neurology follow up. Pt voices concern for progressing MS and complains of intermittent sxs including tingling and muscle spasms for the past several months. Pertinent PMHx includes kidney stone, MS, seizures, renal stone. Pertinent past surgical hx includes appendectomy, cholecystectomy and kidney stent.                 The history is provided by the patient and medical records.     Review of patient's allergies indicates:   Allergen Reactions    Stadol [butorphanol tartrate] Anaphylaxis    Demerol [meperidine]      vomiting    Iodinated contrast- oral and iv dye     Levaquin [levofloxacin] Hives    Metoclopramide hcl Hives    Morphine      vomiting      Povidone-iodine     Reglan [metoclopramide] Hives    Soap     Toradol [ketorolac] Other (See Comments)     Told not to take due to kidneys    Zofran [ondansetron hcl (pf)]     Iodine Rash     Past Medical History:   Diagnosis Date    Kidney stone     Kidney stone     Kidney stone     Mitral valve prolapse     MS (multiple sclerosis)     Renal disorder " kidney stones    Seizures      Past Surgical History:   Procedure Laterality Date    APPENDECTOMY       SECTION      CHOLECYSTECTOMY      HERNIA REPAIR Left 2017    HYSTERECTOMY      kidney stent      REPAIR-HERNIA-inguinal Left 3/7/2017    Performed by [facility]    SHOULDER ARTHROSCOPY Right     TONSILLECTOMY      URETER SURGERY       Family History   Problem Relation Age of Onset    No Known Problems Mother     Diabetes Father      Social History     Tobacco Use    Smoking status: Current Every Day Smoker     Packs/day: 1.00    Smokeless tobacco: Never Used   Substance Use Topics    Alcohol use: Yes     Comment: occ    Drug use: Yes     Types: Marijuana     Comment: about 1 week ago     Review of Systems   Constitutional: Negative for fever.   HENT: Negative for sore throat.    Respiratory: Negative for shortness of breath.    Cardiovascular: Negative for chest pain.   Gastrointestinal: Positive for nausea.   Genitourinary: Positive for flank pain. Negative for dysuria.   Musculoskeletal: Positive for back pain and myalgias (chronic ).   Skin: Negative for rash.   Neurological: Positive for syncope. Negative for weakness.        +paresthesias (chronic)    Hematological: Does not bruise/bleed easily.       Physical Exam     Initial Vitals [19 1559]   BP Pulse Resp Temp SpO2   138/65 100 16 97.3 °F (36.3 °C) 99 %      MAP       --         Physical Exam    Nursing note and vitals reviewed.  Constitutional: She appears well-developed and well-nourished. She is not diaphoretic. No distress.   HENT:   Head: Normocephalic and atraumatic.   Mouth/Throat: Oropharynx is clear and moist.   Eyes: Conjunctivae are normal.   Neck: Neck supple.   Cardiovascular: Normal rate, regular rhythm, normal heart sounds and intact distal pulses. Exam reveals no gallop and no friction rub.    No murmur heard.  Pulmonary/Chest: Breath sounds normal. She has no wheezes. She has no  rhonchi. She has no rales.   Abdominal: Soft. She exhibits no distension. There is no tenderness.   Musculoskeletal: Normal range of motion. She exhibits no tenderness.   Neurological: She is alert and oriented to person, place, and time. She has normal strength.   Skin: No rash noted. No erythema.   Psychiatric: Her speech is normal.         ED Course   Procedures  Labs Reviewed   CBC W/ AUTO DIFFERENTIAL - Abnormal; Notable for the following components:       Result Value    WBC 15.10 (*)     MCH 32.5 (*)     Platelets 407 (*)     MPV 8.0 (*)     Gran # (ANC) 10.0 (*)     All other components within normal limits   BASIC METABOLIC PANEL - Abnormal; Notable for the following components:    Potassium 3.4 (*)     All other components within normal limits   URINALYSIS, REFLEX TO URINE CULTURE - Abnormal; Notable for the following components:    Specific Gravity, UA <=1.005 (*)     All other components within normal limits    Narrative:     Preferred Collection Type->Urine, Clean Catch          Imaging Results          CT Renal Stone Study ABD Pelvis WO (Final result)  Result time 03/13/19 17:47:16    Final result by German Valencia MD (03/13/19 17:47:16)                 Impression:      Nonobstructive bilateral nephrolithiasis.    Cholecystectomy and hysterectomy.      Electronically signed by: German Valencia MD  Date:    03/13/2019  Time:    17:47             Narrative:    EXAMINATION:  CT RENAL STONE STUDY ABD PELVIS WO    CLINICAL HISTORY:  Flank pain, stone disease suspected;R flank pain;    TECHNIQUE:  Low dose axial images, sagittal and coronal reformations were obtained from the lung bases to the pubic symphysis.  Contrast was not administered.    COMPARISON:  08/29/2018    FINDINGS:  The liver, spleen, pancreas, and adrenal glands are unremarkable.  There has been a cholecystectomy.    There are a few small nonobstructing stones within both kidneys.  No ureteral stone or hydronephrosis.    The small  bowel is normal caliber.  The appendix is normal.  The colon is unremarkable.    There has been hysterectomy.  No acute bony abnormality.                            (rad read)    The patient was informed of the incidental finding(s) as well as the need for PCP or specialist follow-up for reevaluation and possible further investigation or monitoring.     Medical Decision Making:   History:   Old Medical Records: I decided to obtain old medical records.            Scribe Attestation:   Scribe #1: I performed the above scribed service and the documentation accurately describes the services I performed. I attest to the accuracy of the note.    I, Dr. Law Zhang, personally performed the services described in this documentation. All medical record entries made by the scribe were at my direction and in my presence.  I have reviewed the chart and agree that the record reflects my personal performance and is accurate and complete. Law Zhang MD.  10:07 PM 03/13/2019    Natividad Acosta is a 43 y.o. female presenting with syncope associated with right flank pain.  Extensive workup of right flank pain that is reproducible on exam shows no sign of pyelonephritis or renal colic.  I doubt life-threatening intrathoracic disease.  I doubt pneumothorax.  I doubt other emergent intrathoracic process such as PE and do not think further D-dimer or CT angiography is indicated.  No sign of other acute intra-abdominal process such as psoas abscess or atypical appendicitis.  IV hydromorphone and prochlorperazine given here for symptomatic relief.  When updated the patient on her workup to date and anticipated discharge, she became very upset I did not intend to administer or prescribe additional opioid analgesia.  Review of the prescription monitoring database reveals frequent prescriptions by multiple different providers.  I do not think additional opioid prescriptions would be in her best interest.  I did  recommend non opioid therapy with NSAIDs and acetaminophen pending close outpatient follow-up.  Detailed return precautions reviewed.  Follow up with Neurology given possible recurrent seizure with no sign of ongoing seizure and very low suspicion for emergent, life-threatening intracranial process such as CVA.  I do not think further lumbar puncture or brain imaging are indicated.  Leukocytosis noted is nonspecific and I doubt infectious process such as sepsis.             Clinical Impression:       ICD-10-CM ICD-9-CM   1. Right flank pain R10.9 789.09   2. Dysuria R30.0 788.1   3. Loss of consciousness R40.20 780.09                                  Law Zhang MD  03/13/19 6513

## 2019-03-13 NOTE — DISCHARGE INSTRUCTIONS
[facility]    [facility] [phone]    - Dr. Soares  - Dr. Garcia  - Dr. Scott  - Dr. Swartz  - Dr. Godfrey  - Dr. Rosales  - Dr. Hyde  - Dr. Arboleda ([place])    [facility] [phone]    - Dr. Kendall  - Dr. Thompson  - Dr. Rosales  - Dr. Simmons    [facility]    - Dr. Milligan [phone]  - Dr. Santos [phone]  - Dr. Jewell [phone]    Dr. Mancuso [phone]  Dr. Oneal [phone]  Dr. Vickers [phone]  Dr. Milligan [phone]  Dr. Hardwick [phone]  Dr. Matthews [phone]  Dr. Cortés [phone]  Dr. Kahn [phone]  Dr. Byers [phone]    [facility]    [facility] [phone]  Dr. Mulligan [phone]  Dr. Hill [phone]  Dr. Landis [phone]  Dr. Morrow [phone]  Dr. Early [phone]  Dr. Genao [phone]          [facility]  - [address] [place] [address]  - [phone]    [facility]  - [address] [place] [address]  - [phone]    [facility]  - [address], [address], [place]  - [phone]    [facility]  - [address] [place] [address]  - [phone]    [facility]  - [address], [place] [address]   [phone]    [facility]  - [address] [facility] [place] [address] - [phone]    [facility]  - [address] [place] [address]  - [phone]

## 2019-04-11 RX ORDER — BUPROPION HYDROCHLORIDE 150 MG/1
TABLET, EXTENDED RELEASE ORAL
Qty: 60 TABLET | Refills: 0 | Status: SHIPPED | OUTPATIENT
Start: 2019-04-11 | End: 2023-02-16

## 2019-05-15 ENCOUNTER — CLINICAL SUPPORT (OUTPATIENT)
Dept: URGENT CARE | Facility: CLINIC | Age: 44
End: 2019-05-15
Payer: MEDICAID

## 2019-05-15 ENCOUNTER — TELEPHONE (OUTPATIENT)
Dept: ORTHOPEDICS | Facility: CLINIC | Age: 44
End: 2019-05-15

## 2019-05-15 VITALS
HEART RATE: 107 BPM | DIASTOLIC BLOOD PRESSURE: 93 MMHG | WEIGHT: 132 LBS | OXYGEN SATURATION: 98 % | TEMPERATURE: 97 F | HEIGHT: 61 IN | BODY MASS INDEX: 24.92 KG/M2 | SYSTOLIC BLOOD PRESSURE: 139 MMHG | RESPIRATION RATE: 18 BRPM

## 2019-05-15 DIAGNOSIS — S43.401A SPRAIN OF RIGHT SHOULDER, UNSPECIFIED SHOULDER SPRAIN TYPE, INITIAL ENCOUNTER: Primary | ICD-10-CM

## 2019-05-15 PROCEDURE — 73030 PR  X-RAY SHOULDER 2+ VW: ICD-10-PCS | Mod: RT,S$GLB,, | Performed by: NURSE PRACTITIONER

## 2019-05-15 PROCEDURE — 73030 X-RAY EXAM OF SHOULDER: CPT | Mod: RT,S$GLB,, | Performed by: NURSE PRACTITIONER

## 2019-05-15 PROCEDURE — 99214 PR OFFICE/OUTPT VISIT, EST, LEVL IV, 30-39 MIN: ICD-10-PCS | Mod: 25,S$GLB,, | Performed by: NURSE PRACTITIONER

## 2019-05-15 PROCEDURE — 99214 OFFICE O/P EST MOD 30 MIN: CPT | Mod: 25,S$GLB,, | Performed by: NURSE PRACTITIONER

## 2019-05-15 RX ORDER — OXYCODONE AND ACETAMINOPHEN 2.5; 325 MG/1; MG/1
1 TABLET ORAL EVERY 6 HOURS PRN
Qty: 20 TABLET | Refills: 0 | Status: SHIPPED | OUTPATIENT
Start: 2019-05-15 | End: 2022-08-10

## 2019-05-15 NOTE — PROGRESS NOTES
"Subjective:       Patient ID: Natividad Acosta is a 44 y.o. female.    Vitals:  height is 5' 1" (1.549 m) and weight is 59.9 kg (132 lb). Her temperature is 97.1 °F (36.2 °C). Her blood pressure is 139/93 (abnormal) and her pulse is 107. Her respiration is 18 and oxygen saturation is 98%.     Chief Complaint: Shoulder Pain (Rt. )    Shoulder Pain    The pain is present in the right shoulder. This is a new problem. The current episode started today. There has been a history of trauma. The problem occurs constantly. The problem has been gradually worsening. The quality of the pain is described as aching, burning and pounding. The pain is at a severity of 8/10. The pain is severe. Associated symptoms include an inability to bear weight and a limited range of motion. The symptoms are aggravated by contact and activity. Her past medical history is significant for Injuries to Extremity.       Constitution: Negative for fatigue.   HENT: Negative for facial swelling and facial trauma.    Neck: Negative for neck stiffness.   Cardiovascular: Negative for chest trauma.   Eyes: Negative for eye trauma, double vision and blurred vision.   Gastrointestinal: Negative for abdominal trauma, abdominal pain and rectal bleeding.   Genitourinary: Negative for hematuria, missed menses, genital trauma and pelvic pain.   Musculoskeletal: Positive for pain, trauma and abnormal ROM of joint. Negative for joint swelling.   Skin: Negative for color change, wound, abrasion, laceration and bruising.   Neurological: Negative for dizziness, history of vertigo, light-headedness, coordination disturbances, altered mental status and loss of consciousness.   Hematologic/Lymphatic: Negative for history of bleeding disorder.   Psychiatric/Behavioral: Negative for altered mental status.       Objective:      Physical Exam   Constitutional: She is oriented to person, place, and time. She appears well-developed and well-nourished. She is cooperative. "  Non-toxic appearance. She does not appear ill. No distress.   HENT:   Head: Normocephalic and atraumatic. Head is without abrasion, without contusion and without laceration.   Right Ear: Hearing, tympanic membrane, external ear and ear canal normal. No hemotympanum.   Left Ear: Hearing, tympanic membrane, external ear and ear canal normal. No hemotympanum.   Nose: Nose normal. No mucosal edema, rhinorrhea or nasal deformity. No epistaxis. Right sinus exhibits no maxillary sinus tenderness and no frontal sinus tenderness. Left sinus exhibits no maxillary sinus tenderness and no frontal sinus tenderness.   Mouth/Throat: Uvula is midline, oropharynx is clear and moist and mucous membranes are normal. No trismus in the jaw. Normal dentition. No uvula swelling. No posterior oropharyngeal erythema.   Eyes: Pupils are equal, round, and reactive to light. Conjunctivae, EOM and lids are normal. Right eye exhibits no discharge. Left eye exhibits no discharge. No scleral icterus.   Sclera clear bilat   Neck: Trachea normal, normal range of motion, full passive range of motion without pain and phonation normal. Neck supple. No spinous process tenderness and no muscular tenderness present. No neck rigidity. No tracheal deviation present.   Cardiovascular: Normal rate, regular rhythm, normal heart sounds, intact distal pulses and normal pulses.   Pulmonary/Chest: Effort normal and breath sounds normal. No respiratory distress.   Abdominal: Soft. Normal appearance and bowel sounds are normal. She exhibits no distension, no pulsatile midline mass and no mass. There is no tenderness.   Musculoskeletal: She exhibits no edema or deformity.        Right shoulder: She exhibits tenderness and pain. She exhibits normal range of motion, no bony tenderness and no spasm.   Neurological: She is alert and oriented to person, place, and time. She has normal strength. No cranial nerve deficit or sensory deficit. She exhibits normal muscle tone.  She displays no seizure activity. Coordination normal. GCS eye subscore is 4. GCS verbal subscore is 5. GCS motor subscore is 6.   Skin: Skin is warm, dry and intact. Capillary refill takes less than 2 seconds. No abrasion, no bruising, no burn, no ecchymosis and no laceration noted. She is not diaphoretic. No pallor.   Psychiatric: She has a normal mood and affect. Her speech is normal and behavior is normal. Judgment and thought content normal. Cognition and memory are normal.   Nursing note and vitals reviewed.      Assessment:       1. Sprain of right shoulder, unspecified shoulder sprain type, initial encounter        Plan:     Sling, ice, Norco. Orthopedic follow up. Xray negative. Orthopedic referral placed in Epic.    Sprain of right shoulder, unspecified shoulder sprain type, initial encounter

## 2019-05-15 NOTE — TELEPHONE ENCOUNTER
Called patient to let her know there are no available appointments and she would need to call her insurance and see who is in network with her insurance. No answer left message

## 2019-05-22 ENCOUNTER — CLINICAL SUPPORT (OUTPATIENT)
Dept: URGENT CARE | Facility: CLINIC | Age: 44
End: 2019-05-22
Payer: MEDICAID

## 2019-05-22 VITALS
HEART RATE: 106 BPM | OXYGEN SATURATION: 99 % | TEMPERATURE: 98 F | HEIGHT: 61 IN | SYSTOLIC BLOOD PRESSURE: 117 MMHG | WEIGHT: 130.63 LBS | RESPIRATION RATE: 18 BRPM | BODY MASS INDEX: 24.66 KG/M2 | DIASTOLIC BLOOD PRESSURE: 83 MMHG

## 2019-05-22 DIAGNOSIS — S43.401D SPRAIN OF RIGHT SHOULDER, UNSPECIFIED SHOULDER SPRAIN TYPE, SUBSEQUENT ENCOUNTER: Primary | ICD-10-CM

## 2019-05-22 PROCEDURE — 99214 OFFICE O/P EST MOD 30 MIN: CPT | Mod: S$GLB,,, | Performed by: NURSE PRACTITIONER

## 2019-05-22 PROCEDURE — 99214 PR OFFICE/OUTPT VISIT, EST, LEVL IV, 30-39 MIN: ICD-10-PCS | Mod: S$GLB,,, | Performed by: NURSE PRACTITIONER

## 2019-05-22 RX ORDER — OXYCODONE AND ACETAMINOPHEN 5; 325 MG/1; MG/1
1 TABLET ORAL EVERY 6 HOURS PRN
Qty: 20 TABLET | Refills: 0 | Status: SHIPPED | OUTPATIENT
Start: 2019-05-22 | End: 2022-08-10

## 2019-05-22 NOTE — PATIENT INSTRUCTIONS
Referral provided for external ortho      Shoulder Sprain  A sprain is a stretching or tearing of the ligaments that hold a joint together. A sprain may take up to 8 weeks to fully heal, depending on how severe it is. Moderate to severe shoulder sprains are treated with a sling or shoulder immobilizer. Minor sprains can be treated without any special support.  Home care  The following guidelines will help you care for your injury at home:  · If a sling was given to you, leave it in place for the time advised by your healthcare provider. If you arent sure how long to wear it, ask for advice. If the sling becomes loose, adjust it so that your forearm is level with the ground. Your shoulder should feel well supported.  · Put an ice pack on the injured area for 20 minutes every 1 to 2 hours the first day. You can make your own ice pack by putting ice cubes in a plastic bag. A bag of frozen peas or something similar works well too. Wrap the bag in a thin towel. Continue with ice packs 3 to 4 times a day for the next 2 to 3 days. Then use the pack as needed to ease pain and swelling.  · You may use acetaminophen or ibuprofen to control pain, unless another pain medicine was prescribed. If you have chronic liver or kidney disease, talk with your healthcare provider before using these medicines. Also talk with your provider if youve had a stomach ulcer or gastrointestinal bleeding.  · Shoulder joints become stiff if left in a sling for too long. You should start range of motion exercises about 7 to 10 days after the injury. Talk with your provider to find out what type of exercises to do and how soon to start.  Follow-up care  Follow up with your healthcare provider, or as advised.  Any X-rays you had today dont show any broken bones, breaks, or fractures. Sometimes fractures dont show up on the first X-ray. Bruises and sprains can sometimes hurt as much as a fracture. These injuries can take time to heal completely. If  your symptoms dont improve or they get worse, talk with your provider. You may need a repeat X-ray or other treatments.  When to seek medical advice  Call your healthcare provider right away if any of these occur:  · Shoulder pain or swelling in your arm that gets worse  · Fingers become cold, blue, numb, or tingly  · Large amount of bruising of the shoulder or upper arm  · Fever or chills  Date Last Reviewed: 8/1/2016 © 2000-2017 [employer]. [address] [place] [address]. All rights reserved. This information is not intended as a substitute for professional medical care. Always follow your healthcare professional's instructions.        Self-Care for Strains and Sprains  Most minor strains and sprains can be treated with self-care. Recovering from a strain or sprain may take 6 to 8 weeks. Your self-care goal is to reduce pain and immobilize the injury to speed healing.     A sprain injures ligaments (tissue that connects bones to bones).        A strain injures muscles or tendons (tissue that connects muscles to bones).   Support the injured area  Wrapping the injured area provides support for short, necessary activities. Be careful not to wrap the area too tightly. This could cut off the blood supply.  · Support a wrist, elbow, or shoulder with a sling.  · Wrap an ankle or knee with an elastic bandage.  · Tape a finger or toe to the one next to it.  Use cold and heat  Cold reduces swelling. Both cold and heat reduce pain. Heat should not be used in the initial treatment of the injury. When using cold or heat, always place a towel between the pack and your skin.  · Apply ice or a cold pack 10 to 15 minutes every hour youre awake for the first 2 days.  · After the swelling goes down, use cold or heat to control pain. Dont use heat late in the day, since it can cause swelling when youre not active.  Rest and elevate  Rest and elevation help your injury heal faster.  · Raise the injured  area above your heart level.  · Keep the injured area from moving.  · Limit the use of the joint or limb.  Use medicine  · Aspirin reduces pain and swelling. (Note: Dont give aspirin to a child 18 or younger unless prescribed by the doctor.)  · Aspirin substitutes, such as ibuprofen, can reduce pain. Some substitutes reduce swelling, too. Ask your pharmacist which substitutes you can use.  Call your doctor if:  · The injured joint wont move, or bones make a grating sound when they move.  · You cant put weight on the injured area, even after 24 hours.  · The injured body part is cold, blue, or numb.  · The joint or limb appears bent or crooked.  · Pain increases or doesnt improve in 4 days.  · When pressing along the injured area, you notice a spot that is especially painful.   Date Last Reviewed: 9/29/2015  © 4255-6337 [employer]. [address] [place] [address], [place] [address]. All rights reserved. This information is not intended as a substitute for professional medical care. Always follow your healthcare professional's instructions.

## 2019-05-22 NOTE — PROGRESS NOTES
"Subjective:       Patient ID: Natividad Acosta is a 44 y.o. female.    Vitals:  height is 5' 1" (1.549 m) and weight is 59.2 kg (130 lb 9.6 oz). Her oral temperature is 98.4 °F (36.9 °C). Her blood pressure is 117/83 and her pulse is 106. Her respiration is 18 and oxygen saturation is 99%.     Chief Complaint: Arm Pain    Pt states "Was here on 5-15-19; had right shoulder surgery (Rotator cuff repair); was referred to orthopedics, but they are not seeing new patients, so she found one herself, but need a referral and a disc with the x-ray."  States pain feels exactly like when she had a rotator cuff tear with nerve pain. Using a sling for comfort but states is very painful    Arm Pain    The incident occurred 5 to 7 days ago. The incident occurred at home. The injury mechanism was a fall. The pain is present in the right shoulder. The quality of the pain is described as burning and aching (Throbbing). The pain radiates to the right neck. The pain has been constant since the incident. Associated symptoms include numbness and tingling. Pertinent negatives include no chest pain. The symptoms are aggravated by lifting and movement.       Constitution: Negative for chills, fatigue and fever.   HENT: Negative for congestion and sore throat.    Neck: Negative for painful lymph nodes.   Cardiovascular: Negative for chest pain and leg swelling.   Eyes: Negative for double vision and blurred vision.   Respiratory: Negative for cough and shortness of breath.    Gastrointestinal: Negative for nausea, vomiting and diarrhea.   Genitourinary: Negative for dysuria, frequency, urgency and history of kidney stones.   Musculoskeletal: Positive for pain, joint pain and abnormal ROM of joint. Negative for joint swelling, muscle cramps and muscle ache.   Skin: Negative for color change, pale, rash and bruising.   Allergic/Immunologic: Negative for seasonal allergies.   Neurological: Positive for numbness. Negative for dizziness, " history of vertigo, light-headedness, passing out and headaches.   Hematologic/Lymphatic: Negative for swollen lymph nodes.   Psychiatric/Behavioral: Negative for nervous/anxious, sleep disturbance and depression. The patient is not nervous/anxious.        Objective:      Physical Exam   Constitutional: She is oriented to person, place, and time. She appears well-developed and well-nourished. She is cooperative.  Non-toxic appearance. She does not appear ill. No distress.   HENT:   Head: Normocephalic and atraumatic.   Right Ear: Hearing, tympanic membrane, external ear and ear canal normal.   Left Ear: Hearing, tympanic membrane, external ear and ear canal normal.   Nose: Nose normal. No mucosal edema, rhinorrhea or nasal deformity. No epistaxis. Right sinus exhibits no maxillary sinus tenderness and no frontal sinus tenderness. Left sinus exhibits no maxillary sinus tenderness and no frontal sinus tenderness.   Mouth/Throat: Uvula is midline, oropharynx is clear and moist and mucous membranes are normal. No trismus in the jaw. Normal dentition. No uvula swelling. No posterior oropharyngeal erythema.   Eyes: Conjunctivae and lids are normal. Right eye exhibits no discharge. Left eye exhibits no discharge. No scleral icterus.   Sclera clear bilat   Neck: Trachea normal, normal range of motion, full passive range of motion without pain and phonation normal. Neck supple.   Cardiovascular: Normal rate, regular rhythm, normal heart sounds, intact distal pulses and normal pulses.   Pulmonary/Chest: Effort normal and breath sounds normal. No respiratory distress.   Abdominal: Soft. Normal appearance and bowel sounds are normal. She exhibits no distension, no pulsatile midline mass and no mass. There is no tenderness.   Musculoskeletal: She exhibits no edema or deformity.        Right shoulder: She exhibits decreased range of motion, tenderness, bony tenderness and pain. She exhibits no swelling, no effusion, no crepitus,  no deformity, no laceration, no spasm, normal pulse and normal strength.   Neurological: She is alert and oriented to person, place, and time. She exhibits normal muscle tone. Coordination normal.   Skin: Skin is warm, dry and intact. She is not diaphoretic. No pallor.   Psychiatric: She has a normal mood and affect. Her speech is normal and behavior is normal. Judgment and thought content normal. Cognition and memory are normal.   Nursing note and vitals reviewed.      Assessment:       1. Sprain of right shoulder, unspecified shoulder sprain type, subsequent encounter        Plan:     written referral provided. Pt states [facility] not accepting her coverage at this time  Sprain of right shoulder, unspecified shoulder sprain type, subsequent encounter    Other orders  -     oxyCODONE-acetaminophen (PERCOCET) 5-325 mg per tablet; Take 1 tablet by mouth every 6 (six) hours as needed for Pain.  Dispense: 20 tablet; Refill: 0

## 2019-09-18 ENCOUNTER — HOSPITAL ENCOUNTER (EMERGENCY)
Facility: HOSPITAL | Age: 44
Discharge: HOME | End: 2019-09-18
Attending: EMERGENCY MEDICINE
Payer: MEDICAID

## 2019-09-18 VITALS
WEIGHT: 127 LBS | RESPIRATION RATE: 20 BRPM | HEART RATE: 86 BPM | SYSTOLIC BLOOD PRESSURE: 114 MMHG | HEIGHT: 61 IN | OXYGEN SATURATION: 99 % | BODY MASS INDEX: 23.98 KG/M2 | TEMPERATURE: 98 F | DIASTOLIC BLOOD PRESSURE: 68 MMHG

## 2019-09-18 DIAGNOSIS — N12 PYELONEPHRITIS: ICD-10-CM

## 2019-09-18 DIAGNOSIS — M54.31 SCIATICA OF RIGHT SIDE: Primary | ICD-10-CM

## 2019-09-18 DIAGNOSIS — N39.0 URINARY TRACT INFECTION WITHOUT HEMATURIA, SITE UNSPECIFIED: ICD-10-CM

## 2019-09-18 LAB
ALBUMIN SERPL BCP-MCNC: 3.7 G/DL (ref 3.5–5.2)
ALP SERPL-CCNC: 71 U/L (ref 55–135)
ALT SERPL W/O P-5'-P-CCNC: 19 U/L (ref 10–44)
AMPHET+METHAMPHET UR QL: NORMAL
AMYLASE SERPL-CCNC: 48 U/L (ref 20–110)
ANION GAP SERPL CALC-SCNC: 10 MMOL/L (ref 8–16)
APTT PPP: 24.4 SEC (ref 26.2–34.7)
AST SERPL-CCNC: 15 U/L (ref 10–40)
B-HCG UR QL: NEGATIVE
BACTERIA #/AREA URNS HPF: ABNORMAL /HPF
BARBITURATES UR QL SCN>200 NG/ML: NORMAL
BASOPHILS # BLD AUTO: 0.04 K/UL (ref 0–0.2)
BASOPHILS NFR BLD: 0.2 % (ref 0–1.9)
BENZODIAZ UR QL SCN>200 NG/ML: NEGATIVE
BILIRUB SERPL-MCNC: 0.3 MG/DL (ref 0.1–1)
BILIRUB UR QL STRIP: ABNORMAL
BUN SERPL-MCNC: 15 MG/DL (ref 6–20)
BZE UR QL SCN: NEGATIVE
CALCIUM SERPL-MCNC: 9 MG/DL (ref 8.7–10.5)
CANNABINOIDS UR QL SCN: NORMAL
CHLORIDE SERPL-SCNC: 101 MMOL/L (ref 95–110)
CK SERPL-CCNC: 43 U/L (ref 20–180)
CLARITY UR: CLEAR
CO2 SERPL-SCNC: 25 MMOL/L (ref 23–29)
COLOR UR: ABNORMAL
CREAT SERPL-MCNC: 0.8 MG/DL (ref 0.5–1.4)
CREAT UR-MCNC: 170 MG/DL (ref 15–325)
CTP QC/QA: YES
DIFFERENTIAL METHOD: ABNORMAL
EOSINOPHIL # BLD AUTO: 0.1 K/UL (ref 0–0.5)
EOSINOPHIL NFR BLD: 0.2 % (ref 0–8)
ERYTHROCYTE [DISTWIDTH] IN BLOOD BY AUTOMATED COUNT: 11.5 % (ref 11.5–14.5)
EST. GFR  (AFRICAN AMERICAN): >60 ML/MIN/1.73 M^2
EST. GFR  (NON AFRICAN AMERICAN): >60 ML/MIN/1.73 M^2
GLUCOSE SERPL-MCNC: 107 MG/DL (ref 70–110)
GLUCOSE UR QL STRIP: NEGATIVE
HCT VFR BLD AUTO: 40.2 % (ref 37–48.5)
HGB BLD-MCNC: 13.8 G/DL (ref 12–16)
HGB UR QL STRIP: NEGATIVE
HYALINE CASTS #/AREA URNS LPF: 8 /LPF
IMM GRANULOCYTES # BLD AUTO: 0.1 K/UL (ref 0–0.04)
IMM GRANULOCYTES NFR BLD AUTO: 0.4 % (ref 0–0.5)
INR PPP: 0.9
KETONES UR QL STRIP: NEGATIVE
LEUKOCYTE ESTERASE UR QL STRIP: ABNORMAL
LIPASE SERPL-CCNC: 25 U/L (ref 4–60)
LYMPHOCYTES # BLD AUTO: 3.4 K/UL (ref 1–4.8)
LYMPHOCYTES NFR BLD: 15 % (ref 18–48)
MCH RBC QN AUTO: 33.6 PG (ref 27–31)
MCHC RBC AUTO-ENTMCNC: 34.3 G/DL (ref 32–36)
MCV RBC AUTO: 98 FL (ref 82–98)
MICROSCOPIC COMMENT: ABNORMAL
MONOCYTES # BLD AUTO: 1.7 K/UL (ref 0.3–1)
MONOCYTES NFR BLD: 7.4 % (ref 4–15)
NEUTROPHILS # BLD AUTO: 17.3 K/UL (ref 1.8–7.7)
NEUTROPHILS NFR BLD: 76.8 % (ref 38–73)
NITRITE UR QL STRIP: NEGATIVE
NRBC BLD-RTO: 0 /100 WBC
OPIATES UR QL SCN: NORMAL
PCP UR QL SCN>25 NG/ML: NEGATIVE
PH UR STRIP: 6 [PH] (ref 5–8)
PLATELET # BLD AUTO: 360 K/UL (ref 150–350)
PMV BLD AUTO: 9.6 FL (ref 9.2–12.9)
POTASSIUM SERPL-SCNC: 3.4 MMOL/L (ref 3.5–5.1)
PROT SERPL-MCNC: 7 G/DL (ref 6–8.4)
PROT UR QL STRIP: ABNORMAL
PROTHROMBIN TIME: 12.2 SEC (ref 11.7–14)
RBC # BLD AUTO: 4.11 M/UL (ref 4–5.4)
RBC #/AREA URNS HPF: 1 /HPF (ref 0–4)
SODIUM SERPL-SCNC: 136 MMOL/L (ref 136–145)
SP GR UR STRIP: >1.03 (ref 1–1.03)
SQUAMOUS #/AREA URNS HPF: 19 /HPF
TOXICOLOGY INFORMATION: NORMAL
URN SPEC COLLECT METH UR: ABNORMAL
UROBILINOGEN UR STRIP-ACNC: ABNORMAL EU/DL
WBC # BLD AUTO: 22.53 K/UL (ref 3.9–12.7)
WBC #/AREA URNS HPF: 5 /HPF (ref 0–5)

## 2019-09-18 PROCEDURE — 96367 TX/PROPH/DG ADDL SEQ IV INF: CPT

## 2019-09-18 PROCEDURE — 85025 COMPLETE CBC W/AUTO DIFF WBC: CPT

## 2019-09-18 PROCEDURE — 36415 COLL VENOUS BLD VENIPUNCTURE: CPT

## 2019-09-18 PROCEDURE — 81025 URINE PREGNANCY TEST: CPT | Performed by: EMERGENCY MEDICINE

## 2019-09-18 PROCEDURE — 96361 HYDRATE IV INFUSION ADD-ON: CPT

## 2019-09-18 PROCEDURE — 99285 EMERGENCY DEPT VISIT HI MDM: CPT | Mod: 25

## 2019-09-18 PROCEDURE — 96375 TX/PRO/DX INJ NEW DRUG ADDON: CPT

## 2019-09-18 PROCEDURE — 96365 THER/PROPH/DIAG IV INF INIT: CPT

## 2019-09-18 PROCEDURE — 85610 PROTHROMBIN TIME: CPT

## 2019-09-18 PROCEDURE — 87086 URINE CULTURE/COLONY COUNT: CPT

## 2019-09-18 PROCEDURE — 63600175 PHARM REV CODE 636 W HCPCS: Performed by: PHYSICIAN ASSISTANT

## 2019-09-18 PROCEDURE — 63600175 PHARM REV CODE 636 W HCPCS: Performed by: EMERGENCY MEDICINE

## 2019-09-18 PROCEDURE — 83690 ASSAY OF LIPASE: CPT

## 2019-09-18 PROCEDURE — 80307 DRUG TEST PRSMV CHEM ANLYZR: CPT

## 2019-09-18 PROCEDURE — 82550 ASSAY OF CK (CPK): CPT

## 2019-09-18 PROCEDURE — 80053 COMPREHEN METABOLIC PANEL: CPT

## 2019-09-18 PROCEDURE — 96376 TX/PRO/DX INJ SAME DRUG ADON: CPT

## 2019-09-18 PROCEDURE — 82150 ASSAY OF AMYLASE: CPT

## 2019-09-18 PROCEDURE — 96368 THER/DIAG CONCURRENT INF: CPT

## 2019-09-18 PROCEDURE — 85730 THROMBOPLASTIN TIME PARTIAL: CPT

## 2019-09-18 PROCEDURE — 81001 URINALYSIS AUTO W/SCOPE: CPT

## 2019-09-18 RX ORDER — HYDROMORPHONE HYDROCHLORIDE 1 MG/ML
1 INJECTION, SOLUTION INTRAMUSCULAR; INTRAVENOUS; SUBCUTANEOUS
Status: COMPLETED | OUTPATIENT
Start: 2019-09-18 | End: 2019-09-18

## 2019-09-18 RX ORDER — ACETAMINOPHEN 10 MG/ML
1000 INJECTION, SOLUTION INTRAVENOUS ONCE
Status: COMPLETED | OUTPATIENT
Start: 2019-09-18 | End: 2019-09-18

## 2019-09-18 RX ORDER — ACETAMINOPHEN 500 MG
1000 TABLET ORAL
Status: DISCONTINUED | OUTPATIENT
Start: 2019-09-18 | End: 2019-09-18

## 2019-09-18 RX ORDER — BUPROPION HYDROCHLORIDE 150 MG/1
150 TABLET ORAL 2 TIMES DAILY
Status: ON HOLD | COMMUNITY
End: 2019-09-26 | Stop reason: HOSPADM

## 2019-09-18 RX ORDER — KETOROLAC TROMETHAMINE 30 MG/ML
10 INJECTION, SOLUTION INTRAMUSCULAR; INTRAVENOUS
Status: COMPLETED | OUTPATIENT
Start: 2019-09-18 | End: 2019-09-18

## 2019-09-18 RX ORDER — ONDANSETRON 2 MG/ML
8 INJECTION INTRAMUSCULAR; INTRAVENOUS
Status: COMPLETED | OUTPATIENT
Start: 2019-09-18 | End: 2019-09-18

## 2019-09-18 RX ORDER — HYDROMORPHONE HYDROCHLORIDE 1 MG/ML
2 INJECTION, SOLUTION INTRAMUSCULAR; INTRAVENOUS; SUBCUTANEOUS
Status: COMPLETED | OUTPATIENT
Start: 2019-09-18 | End: 2019-09-18

## 2019-09-18 RX ORDER — SODIUM CHLORIDE 9 MG/ML
1000 INJECTION, SOLUTION INTRAVENOUS
Status: COMPLETED | OUTPATIENT
Start: 2019-09-18 | End: 2019-09-18

## 2019-09-18 RX ADMIN — ONDANSETRON 8 MG: 2 INJECTION INTRAMUSCULAR; INTRAVENOUS at 08:09

## 2019-09-18 RX ADMIN — KETOROLAC TROMETHAMINE 10 MG: 30 INJECTION, SOLUTION INTRAMUSCULAR at 09:09

## 2019-09-18 RX ADMIN — SODIUM CHLORIDE 1000 ML: 0.9 INJECTION, SOLUTION INTRAVENOUS at 06:09

## 2019-09-18 RX ADMIN — ACETAMINOPHEN 1000 MG: 10 INJECTION, SOLUTION INTRAVENOUS at 08:09

## 2019-09-18 RX ADMIN — HYDROMORPHONE HYDROCHLORIDE 1 MG: 1 INJECTION, SOLUTION INTRAMUSCULAR; INTRAVENOUS; SUBCUTANEOUS at 09:09

## 2019-09-18 RX ADMIN — HYDROMORPHONE HYDROCHLORIDE 2 MG: 1 INJECTION, SOLUTION INTRAMUSCULAR; INTRAVENOUS; SUBCUTANEOUS at 06:09

## 2019-09-18 RX ADMIN — PROMETHAZINE HYDROCHLORIDE 6.25 MG: 25 INJECTION INTRAMUSCULAR; INTRAVENOUS at 09:09

## 2019-09-18 RX ADMIN — CEFTRIAXONE SODIUM 1 G: 1 INJECTION, POWDER, FOR SOLUTION INTRAMUSCULAR; INTRAVENOUS at 11:09

## 2019-09-18 NOTE — ED NOTES
PT PRESENTS TO ED WITH LOWER BACK PAIN X2DAYS. REPORTS PAIN RADIATING TO RIGHT HIP AND SHOOTING DOWN RIGHT LEG. ALSO REPORTS RIGHT ARM JARKING/SHAKING UNCONTROLLABLY AT ONE POINT. 141/83, , 99% ON RA, RR20. NAD NOTED CALL LIGHT IN REACH, WILL MONITOR.

## 2019-09-18 NOTE — ED NOTES
Patient offered tylenol states took 1 hour ago and can not take motrin due to ulcers states tylenol and motrin do not help, PA aware

## 2019-09-19 NOTE — ED PROVIDER NOTES
Encounter Date: 9/18/2019       History     Chief Complaint   Patient presents with    Back Pain     right low back pain for 3 weeks after moving items, fell 2 weeks ago on to buttock, states pain now goes down right leg, gait steady     44-year-old female with a past medical history of depression presents to the emergency department with right lower back pain.  The patient states that she initially hurt her back after cleaning the house approximately 4-5 weeks ago.  Her pain resolved after several days.  The patient fell several days ago and again re-injured her back.  She was seen at an urgent care where she received IM steroids and Toradol.  Her pain improved for approximately 1 day and then returned.  Her pain is severe and located in her right lower back.  It radiates down her right leg.  Her pain is worse with movement and improved with lying flat.  The patient has been taking Flexeril and gabapentin without relief of pain. She cannot take NSAIDs as she has a history of peptic ulcer disease.  The patient denies any history of IV drug use, history of malignancy, fevers or chills, lower extremity weakness or numbness, saddle anesthesia or urinary retention or incontinence.        Review of patient's allergies indicates:   Allergen Reactions    Corticosteroids (glucocorticoids)     Iodine and iodide containing products     Morphine     Stadol [butorphanol tartrate]      Past Medical History:   Diagnosis Date    Depression      History reviewed. No pertinent surgical history.  History reviewed. No pertinent family history.  Social History     Tobacco Use    Smoking status: Current Every Day Smoker     Packs/day: 0.50   Substance Use Topics    Alcohol use: Not on file    Drug use: Not on file     Review of Systems   Constitutional: Negative for chills, diaphoresis, fatigue and fever.   HENT: Negative for congestion.    Eyes: Negative for visual disturbance.   Respiratory: Negative for cough, shortness of  breath, wheezing and stridor.    Cardiovascular: Negative for chest pain and palpitations.   Gastrointestinal: Negative for abdominal distention, diarrhea, nausea and vomiting.   Genitourinary: Negative for dysuria, frequency, hematuria and urgency.   Musculoskeletal: Positive for back pain. Negative for gait problem.   Skin: Negative for pallor.   Neurological: Negative for weakness, light-headedness, numbness and headaches.   Psychiatric/Behavioral: Negative for confusion.   All other systems reviewed and are negative.      Physical Exam     Initial Vitals [09/18/19 1650]   BP Pulse Resp Temp SpO2   (!) 132/59 (!) 116 20 98.5 °F (36.9 °C) 98 %      MAP       --         Physical Exam    Nursing note and vitals reviewed.  Constitutional: She appears well-developed and well-nourished. She appears distressed (Appears in pain).   HENT:   Head: Normocephalic and atraumatic.   Eyes: EOM are normal.   Neck: Normal range of motion. Neck supple.   Cardiovascular: Normal rate, regular rhythm, normal heart sounds and intact distal pulses.   No murmur heard.  Pulmonary/Chest: Breath sounds normal. She has no wheezes. She has no rhonchi. She has no rales.   Abdominal: Soft. She exhibits no distension. There is no tenderness. There is no rebound and no guarding.   Genitourinary:   Genitourinary Comments: No cva tendernes     Musculoskeletal: Normal range of motion.   R lower back tenderness, no midline tendenress   Neurological: She is alert and oriented to person, place, and time. She has normal strength. She displays normal reflexes. No cranial nerve deficit or sensory deficit. GCS score is 15. GCS eye subscore is 4. GCS verbal subscore is 5. GCS motor subscore is 6.   2+ patellar reflexes, 5/5 BLE, sensation intact diffusely, steady gait   Skin: Skin is warm and dry. Capillary refill takes less than 2 seconds.         ED Course   Procedures  Labs Reviewed   URINALYSIS, REFLEX TO URINE CULTURE - Abnormal; Notable for the  following components:       Result Value    Specific Gravity, UA >1.030 (*)     Protein, UA Trace (*)     Bilirubin (UA) 1+ (*)     Urobilinogen, UA 2.0-3.0 (*)     Leukocytes, UA 1+ (*)     All other components within normal limits    Narrative:     Specimen Source->Urine   URINALYSIS MICROSCOPIC - Abnormal; Notable for the following components:    Hyaline Casts, UA 8 (*)     All other components within normal limits    Narrative:     Specimen Source->Urine   APTT - Abnormal; Notable for the following components:    aPTT 24.4 (*)     All other components within normal limits   CBC W/ AUTO DIFFERENTIAL - Abnormal; Notable for the following components:    WBC 22.53 (*)     Mean Corpuscular Hemoglobin 33.6 (*)     Platelets 360 (*)     Gran # (ANC) 17.3 (*)     Immature Grans (Abs) 0.10 (*)     Mono # 1.7 (*)     Gran% 76.8 (*)     Lymph% 15.0 (*)     All other components within normal limits   COMPREHENSIVE METABOLIC PANEL - Abnormal; Notable for the following components:    Potassium 3.4 (*)     All other components within normal limits   DRUG SCREEN PANEL, URINE EMERGENCY    Narrative:     Specimen Source->Urine   AMYLASE   LIPASE   PROTIME-INR   CK   CK   POCT URINE PREGNANCY          Imaging Results          CT Renal Stone Study ABD Pelvis WO (Final result)  Result time 09/18/19 19:37:22    Final result by Felipe Clifford MD (09/18/19 19:37:22)                 Impression:      1. No evidence of obstructive uropathy.  2. Bilateral nonobstructing renal calculi.  3. Normal appendix.  4. Surgical absence of the gallbladder.  5. Moderate amount of retained fecal matter from the cecum to the distal transverse colon.      Electronically signed by: Felipe Clifford MD  Date:    09/18/2019  Time:    19:37             Narrative:    EXAMINATION:  CT RENAL STONE STUDY ABD PELVIS WO    CLINICAL HISTORY:  Flank pain, recurrent stone disease suspected;.    TECHNIQUE:  CMS MANDATED QUALITY DATA - CT RADIATION - 436    All  CT scans at this facility utilize dose modulation, iterative reconstruction, and/or weight based dosing when appropriate to reduce radiation dose to as low as reasonably achievable.    Thin section axial images were obtained, without intravenous contrast. The lack of intravenous contrast limits assessment of solid organs and vascular structures.    COMPARISON:  None.    FINDINGS:  Lung bases are unremarkable.    The liver has a normal noncontrast appearance.  The gallbladder is absent.  The biliary tree is nondilated.  Spleen, pancreas, and adrenal glands are normal.  There is a 4 mm nonobstructing calculus at the lower pole of the right kidney, with a 2 mm nonobstructing calculus at the midpole on the left.  There are no ureteral calculi, and there is no hydronephrosis.    The abdominal aorta is normal in caliber.  Note is made of a moderate amount of retained stool from the cecum to the distal transverse colon.  There is no evidence of bowel obstruction.  The appendix is visualized and is normal.    Images of the pelvis demonstrate no mass, lymphadenopathy, free air, or free fluid.  Bowel structures and urinary bladder are normal.                               X-Ray Lumbar Spine Complete 5 View (Final result)  Result time 09/18/19 18:05:52    Final result by Felipe Clifford MD (09/18/19 18:05:52)                 Impression:      Normal lumbar spine.      Electronically signed by: Felipe Clifford MD  Date:    09/18/2019  Time:    18:05             Narrative:    EXAMINATION:  XR LUMBAR SPINE COMPLETE 5 VIEW    CLINICAL HISTORY:  Low back pain, minor trauma;.    COMPARISON:  None.    FINDINGS:  5 views are negative for fracture or subluxation. No osseous destructive lesion is identified.    Disc height is well-preserved. Bony mineralization is normal.                                 Medical Decision Making:   ED Management:  44-year-old female presents emergency department right lower back pain that radiates  down her right leg.  Differential includes sciatica, disc herniation, myofascial pain, renal stone pyelonephritis, appendicitis.  Low suspicion for cord injury given history and exam.  The patient's workup in the emergency department is remarkable for a leukocytosis of 22; however, the patient did recently receive a steroid injection.  The patient does have 1+ leuk esterase and WBCs as well as rare bacteria on her urinalysis.  X-ray and CT scan are both grossly unremarkable.  Patient was provided with pain control in the emergency department she states her pain is better.  She has been ambulatory without assistance and with a steady gait.  She has a normal neurological exam.  Most likely sciatica/disc disease.  She will be treated with a short course of Norco for breakthrough pain and instructions to continue Flexeril and gabapentin.  Follow-up with PMNR.  Given the patient's UTI and flank pain will also treat for possible pyelonephritis with Rocephin and Augmentin.  The patient has also been given [facility] appointment.  Detailed return precautions were discussed.    Joann Agustin MD  Emergency Medicine  09/18/2019 11:19 PM                     ED Course as of Sep 18 2306   Wed Sep 18, 2019   1804 Insidious is onset back pain, Worsening for the last.  Patient does have radiating pain into the right lower extremity.  Patient also has a history of kidney stones, she has flank pain, urine without significant hematuria but patient does have have bilirubinuria, urobilinogen.    [BF]      ED Course User Index  [BF] TON Kelly     Clinical Impression:       ICD-10-CM ICD-9-CM   1. Sciatica of right side M54.31 724.3   2. Urinary tract infection without hematuria, site unspecified N39.0 599.0                                Joann Agustin MD  09/18/19 3045

## 2019-09-19 NOTE — DISCHARGE INSTRUCTIONS
Continue taking gabapentin 3 times per day.  Continue taking Flexeril.  Follow up closely with PMR.  Return for worsening symptoms.

## 2019-09-20 LAB
BACTERIA UR CULT: NORMAL
BACTERIA UR CULT: NORMAL
BARBITURATES UR QL SCN: NEGATIVE NG/ML
LABORATORY COMMENT REPORT: NORMAL

## 2019-09-24 ENCOUNTER — HOSPITAL ENCOUNTER (OUTPATIENT)
Facility: HOSPITAL | Age: 44
Discharge: HOME | End: 2019-09-26
Attending: EMERGENCY MEDICINE | Admitting: HOSPITALIST
Payer: MEDICAID

## 2019-09-24 DIAGNOSIS — R10.9 ABDOMINAL PAIN, UNSPECIFIED ABDOMINAL LOCATION: ICD-10-CM

## 2019-09-24 DIAGNOSIS — K52.9 COLITIS: ICD-10-CM

## 2019-09-24 DIAGNOSIS — R10.9 ABDOMINAL PAIN: ICD-10-CM

## 2019-09-24 DIAGNOSIS — R52 INTRACTABLE PAIN: ICD-10-CM

## 2019-09-24 DIAGNOSIS — R10.13 EPIGASTRIC PAIN: Primary | ICD-10-CM

## 2019-09-24 PROBLEM — F41.9 ANXIETY DISORDER: Status: ACTIVE | Noted: 2019-09-24

## 2019-09-24 PROBLEM — F17.210 TOBACCO DEPENDENCE DUE TO CIGARETTES: Status: ACTIVE | Noted: 2019-09-24

## 2019-09-24 LAB
ALBUMIN SERPL BCP-MCNC: 2.7 G/DL (ref 3.5–5.2)
ALP SERPL-CCNC: 97 U/L (ref 55–135)
ALT SERPL W/O P-5'-P-CCNC: 9 U/L (ref 10–44)
AMPHET+METHAMPHET UR QL: NEGATIVE
AMYLASE SERPL-CCNC: 21 U/L (ref 20–110)
ANION GAP SERPL CALC-SCNC: 9 MMOL/L (ref 8–16)
AST SERPL-CCNC: 10 U/L (ref 10–40)
BARBITURATES UR QL SCN>200 NG/ML: NORMAL
BASOPHILS # BLD AUTO: 0.08 K/UL (ref 0–0.2)
BASOPHILS NFR BLD: 0.5 % (ref 0–1.9)
BENZODIAZ UR QL SCN>200 NG/ML: NEGATIVE
BILIRUB SERPL-MCNC: 0.3 MG/DL (ref 0.1–1)
BILIRUB UR QL STRIP: NEGATIVE
BUN SERPL-MCNC: 10 MG/DL (ref 6–20)
BZE UR QL SCN: NEGATIVE
CALCIUM SERPL-MCNC: 8.4 MG/DL (ref 8.7–10.5)
CANNABINOIDS UR QL SCN: NEGATIVE
CHLORIDE SERPL-SCNC: 99 MMOL/L (ref 95–110)
CLARITY UR: ABNORMAL
CO2 SERPL-SCNC: 27 MMOL/L (ref 23–29)
COLOR UR: YELLOW
CREAT SERPL-MCNC: 0.6 MG/DL (ref 0.5–1.4)
CREAT UR-MCNC: 51 MG/DL (ref 15–325)
CRP SERPL-MCNC: 15.68 MG/DL (ref 0–0.75)
DIFFERENTIAL METHOD: ABNORMAL
EOSINOPHIL # BLD AUTO: 0.1 K/UL (ref 0–0.5)
EOSINOPHIL NFR BLD: 0.7 % (ref 0–8)
ERYTHROCYTE [DISTWIDTH] IN BLOOD BY AUTOMATED COUNT: 12 % (ref 11.5–14.5)
ERYTHROCYTE [SEDIMENTATION RATE] IN BLOOD BY WESTERGREN METHOD: 65 MM/HR (ref 0–20)
EST. GFR  (AFRICAN AMERICAN): >60 ML/MIN/1.73 M^2
EST. GFR  (NON AFRICAN AMERICAN): >60 ML/MIN/1.73 M^2
GLUCOSE SERPL-MCNC: 100 MG/DL (ref 70–110)
GLUCOSE UR QL STRIP: NEGATIVE
HCT VFR BLD AUTO: 37.5 % (ref 37–48.5)
HGB BLD-MCNC: 12.5 G/DL (ref 12–16)
HGB UR QL STRIP: NEGATIVE
IMM GRANULOCYTES # BLD AUTO: 0.43 K/UL (ref 0–0.04)
IMM GRANULOCYTES NFR BLD AUTO: 2.6 % (ref 0–0.5)
KETONES UR QL STRIP: NEGATIVE
LDH SERPL L TO P-CCNC: 0.52 MMOL/L (ref 0.5–2.2)
LEUKOCYTE ESTERASE UR QL STRIP: NEGATIVE
LIPASE SERPL-CCNC: 22 U/L (ref 4–60)
LYMPHOCYTES # BLD AUTO: 3 K/UL (ref 1–4.8)
LYMPHOCYTES NFR BLD: 18.3 % (ref 18–48)
MCH RBC QN AUTO: 33.2 PG (ref 27–31)
MCHC RBC AUTO-ENTMCNC: 33.3 G/DL (ref 32–36)
MCV RBC AUTO: 100 FL (ref 82–98)
MONOCYTES # BLD AUTO: 0.8 K/UL (ref 0.3–1)
MONOCYTES NFR BLD: 5 % (ref 4–15)
NEUTROPHILS # BLD AUTO: 11.9 K/UL (ref 1.8–7.7)
NEUTROPHILS NFR BLD: 72.9 % (ref 38–73)
NITRITE UR QL STRIP: NEGATIVE
NRBC BLD-RTO: 0 /100 WBC
OPIATES UR QL SCN: NEGATIVE
PCP UR QL SCN>25 NG/ML: NEGATIVE
PH UR STRIP: 8 [PH] (ref 5–8)
PLATELET # BLD AUTO: 358 K/UL (ref 150–350)
PMV BLD AUTO: 9.3 FL (ref 9.2–12.9)
POTASSIUM SERPL-SCNC: 3.4 MMOL/L (ref 3.5–5.1)
PROT SERPL-MCNC: 6.6 G/DL (ref 6–8.4)
PROT UR QL STRIP: NEGATIVE
RBC # BLD AUTO: 3.76 M/UL (ref 4–5.4)
SAMPLE: NORMAL
SODIUM SERPL-SCNC: 135 MMOL/L (ref 136–145)
SP GR UR STRIP: 1 (ref 1–1.03)
TOXICOLOGY INFORMATION: NORMAL
URN SPEC COLLECT METH UR: ABNORMAL
UROBILINOGEN UR STRIP-ACNC: NEGATIVE EU/DL
WBC # BLD AUTO: 16.27 K/UL (ref 3.9–12.7)

## 2019-09-24 PROCEDURE — 25000003 PHARM REV CODE 250: Performed by: EMERGENCY MEDICINE

## 2019-09-24 PROCEDURE — 96372 THER/PROPH/DIAG INJ SC/IM: CPT | Mod: 59

## 2019-09-24 PROCEDURE — 96367 TX/PROPH/DG ADDL SEQ IV INF: CPT

## 2019-09-24 PROCEDURE — G0378 HOSPITAL OBSERVATION PER HR: HCPCS

## 2019-09-24 PROCEDURE — 96366 THER/PROPH/DIAG IV INF ADDON: CPT

## 2019-09-24 PROCEDURE — S0030 INJECTION, METRONIDAZOLE: HCPCS | Performed by: HOSPITALIST

## 2019-09-24 PROCEDURE — 85025 COMPLETE CBC W/AUTO DIFF WBC: CPT

## 2019-09-24 PROCEDURE — 63600175 PHARM REV CODE 636 W HCPCS: Performed by: HOSPITALIST

## 2019-09-24 PROCEDURE — 83690 ASSAY OF LIPASE: CPT

## 2019-09-24 PROCEDURE — 80307 DRUG TEST PRSMV CHEM ANLYZR: CPT

## 2019-09-24 PROCEDURE — 96375 TX/PRO/DX INJ NEW DRUG ADDON: CPT

## 2019-09-24 PROCEDURE — 80053 COMPREHEN METABOLIC PANEL: CPT

## 2019-09-24 PROCEDURE — 25000003 PHARM REV CODE 250: Performed by: HOSPITALIST

## 2019-09-24 PROCEDURE — S0028 INJECTION, FAMOTIDINE, 20 MG: HCPCS | Performed by: EMERGENCY MEDICINE

## 2019-09-24 PROCEDURE — 82150 ASSAY OF AMYLASE: CPT

## 2019-09-24 PROCEDURE — 96376 TX/PRO/DX INJ SAME DRUG ADON: CPT

## 2019-09-24 PROCEDURE — 81003 URINALYSIS AUTO W/O SCOPE: CPT | Mod: 59

## 2019-09-24 PROCEDURE — 83605 ASSAY OF LACTIC ACID: CPT

## 2019-09-24 PROCEDURE — 85651 RBC SED RATE NONAUTOMATED: CPT

## 2019-09-24 PROCEDURE — 87040 BLOOD CULTURE FOR BACTERIA: CPT

## 2019-09-24 PROCEDURE — 99285 EMERGENCY DEPT VISIT HI MDM: CPT | Mod: 25

## 2019-09-24 PROCEDURE — 86140 C-REACTIVE PROTEIN: CPT

## 2019-09-24 PROCEDURE — 63600175 PHARM REV CODE 636 W HCPCS: Performed by: EMERGENCY MEDICINE

## 2019-09-24 PROCEDURE — 93005 ELECTROCARDIOGRAM TRACING: CPT

## 2019-09-24 PROCEDURE — 25500020 PHARM REV CODE 255: Performed by: EMERGENCY MEDICINE

## 2019-09-24 RX ORDER — METRONIDAZOLE 500 MG/100ML
500 INJECTION, SOLUTION INTRAVENOUS
Status: DISCONTINUED | OUTPATIENT
Start: 2019-09-25 | End: 2019-09-26 | Stop reason: HOSPADM

## 2019-09-24 RX ORDER — IBUPROFEN 200 MG
1 TABLET ORAL DAILY
Status: DISCONTINUED | OUTPATIENT
Start: 2019-09-25 | End: 2019-09-26 | Stop reason: HOSPADM

## 2019-09-24 RX ORDER — SODIUM,POTASSIUM PHOSPHATES 280-250MG
2 POWDER IN PACKET (EA) ORAL
Status: DISCONTINUED | OUTPATIENT
Start: 2019-09-24 | End: 2019-09-26 | Stop reason: HOSPADM

## 2019-09-24 RX ORDER — DICLOFENAC SODIUM 10 MG/G
2 GEL TOPICAL ONCE AS NEEDED
COMMUNITY
End: 2023-02-16

## 2019-09-24 RX ORDER — DIPHENHYDRAMINE HYDROCHLORIDE 50 MG/ML
25 INJECTION INTRAMUSCULAR; INTRAVENOUS
Status: COMPLETED | OUTPATIENT
Start: 2019-09-24 | End: 2019-09-24

## 2019-09-24 RX ORDER — POTASSIUM CHLORIDE 20 MEQ/15ML
60 SOLUTION ORAL
Status: DISCONTINUED | OUTPATIENT
Start: 2019-09-24 | End: 2019-09-26 | Stop reason: HOSPADM

## 2019-09-24 RX ORDER — HYDROMORPHONE HYDROCHLORIDE 1 MG/ML
1 INJECTION, SOLUTION INTRAMUSCULAR; INTRAVENOUS; SUBCUTANEOUS
Status: COMPLETED | OUTPATIENT
Start: 2019-09-24 | End: 2019-09-24

## 2019-09-24 RX ORDER — FAMOTIDINE 20 MG/50ML
20 INJECTION, SOLUTION INTRAVENOUS
Status: COMPLETED | OUTPATIENT
Start: 2019-09-24 | End: 2019-09-24

## 2019-09-24 RX ORDER — METRONIDAZOLE 500 MG/100ML
500 INJECTION, SOLUTION INTRAVENOUS
Status: COMPLETED | OUTPATIENT
Start: 2019-09-24 | End: 2019-09-24

## 2019-09-24 RX ORDER — AMOXICILLIN AND CLAVULANATE POTASSIUM 875; 125 MG/1; MG/1
1 TABLET, FILM COATED ORAL
Status: ON HOLD | COMMUNITY
Start: 2019-09-18 | End: 2019-09-26 | Stop reason: HOSPADM

## 2019-09-24 RX ORDER — LANOLIN ALCOHOL/MO/W.PET/CERES
800 CREAM (GRAM) TOPICAL
Status: DISCONTINUED | OUTPATIENT
Start: 2019-09-24 | End: 2019-09-26 | Stop reason: HOSPADM

## 2019-09-24 RX ORDER — HYDROMORPHONE HYDROCHLORIDE 1 MG/ML
0.5 INJECTION, SOLUTION INTRAMUSCULAR; INTRAVENOUS; SUBCUTANEOUS EVERY 6 HOURS PRN
Status: DISCONTINUED | OUTPATIENT
Start: 2019-09-25 | End: 2019-09-24

## 2019-09-24 RX ORDER — QUETIAPINE FUMARATE 25 MG/1
50 TABLET, FILM COATED ORAL 3 TIMES DAILY
Status: DISCONTINUED | OUTPATIENT
Start: 2019-09-25 | End: 2019-09-26 | Stop reason: HOSPADM

## 2019-09-24 RX ORDER — POTASSIUM CHLORIDE 20 MEQ/15ML
40 SOLUTION ORAL
Status: DISCONTINUED | OUTPATIENT
Start: 2019-09-24 | End: 2019-09-26 | Stop reason: HOSPADM

## 2019-09-24 RX ORDER — LEVOFLOXACIN 5 MG/ML
750 INJECTION, SOLUTION INTRAVENOUS
Status: DISCONTINUED | OUTPATIENT
Start: 2019-09-25 | End: 2019-09-26 | Stop reason: HOSPADM

## 2019-09-24 RX ORDER — QUETIAPINE FUMARATE 25 MG/1
50 TABLET, FILM COATED ORAL ONCE
Status: DISCONTINUED | OUTPATIENT
Start: 2019-09-25 | End: 2019-09-25

## 2019-09-24 RX ORDER — LEVOFLOXACIN 5 MG/ML
750 INJECTION, SOLUTION INTRAVENOUS
Status: DISPENSED | OUTPATIENT
Start: 2019-09-24 | End: 2019-09-25

## 2019-09-24 RX ORDER — METHYLPREDNISOLONE SOD SUCC 125 MG
125 VIAL (EA) INJECTION
Status: COMPLETED | OUTPATIENT
Start: 2019-09-24 | End: 2019-09-24

## 2019-09-24 RX ORDER — ONDANSETRON 2 MG/ML
4 INJECTION INTRAMUSCULAR; INTRAVENOUS
Status: COMPLETED | OUTPATIENT
Start: 2019-09-24 | End: 2019-09-24

## 2019-09-24 RX ORDER — LORAZEPAM 2 MG/ML
1 INJECTION INTRAMUSCULAR
Status: COMPLETED | OUTPATIENT
Start: 2019-09-24 | End: 2019-09-24

## 2019-09-24 RX ORDER — HYDROMORPHONE HYDROCHLORIDE 1 MG/ML
0.5 INJECTION, SOLUTION INTRAMUSCULAR; INTRAVENOUS; SUBCUTANEOUS EVERY 6 HOURS PRN
Status: DISCONTINUED | OUTPATIENT
Start: 2019-09-24 | End: 2019-09-24 | Stop reason: SDUPTHER

## 2019-09-24 RX ORDER — SODIUM CHLORIDE 0.9 % (FLUSH) 0.9 %
10 SYRINGE (ML) INJECTION
Status: DISCONTINUED | OUTPATIENT
Start: 2019-09-24 | End: 2019-09-26 | Stop reason: HOSPADM

## 2019-09-24 RX ORDER — ONDANSETRON 2 MG/ML
4 INJECTION INTRAMUSCULAR; INTRAVENOUS EVERY 8 HOURS PRN
Status: DISCONTINUED | OUTPATIENT
Start: 2019-09-24 | End: 2019-09-25

## 2019-09-24 RX ORDER — QUETIAPINE FUMARATE 100 MG/1
100 TABLET, FILM COATED ORAL 3 TIMES DAILY
Status: ON HOLD | COMMUNITY
End: 2019-09-26 | Stop reason: HOSPADM

## 2019-09-24 RX ORDER — BUPROPION HYDROCHLORIDE 150 MG/1
150 TABLET ORAL DAILY
Status: DISCONTINUED | OUTPATIENT
Start: 2019-09-25 | End: 2019-09-26 | Stop reason: HOSPADM

## 2019-09-24 RX ORDER — HYDROMORPHONE HYDROCHLORIDE 1 MG/ML
0.5 INJECTION, SOLUTION INTRAMUSCULAR; INTRAVENOUS; SUBCUTANEOUS EVERY 4 HOURS PRN
Status: DISCONTINUED | OUTPATIENT
Start: 2019-09-25 | End: 2019-09-25

## 2019-09-24 RX ORDER — METRONIDAZOLE 250 MG/1
500 TABLET ORAL EVERY 8 HOURS
Status: DISCONTINUED | OUTPATIENT
Start: 2019-09-25 | End: 2019-09-24

## 2019-09-24 RX ADMIN — FAMOTIDINE 20 MG: 20 INJECTION, SOLUTION INTRAVENOUS at 06:09

## 2019-09-24 RX ADMIN — HYDROMORPHONE HYDROCHLORIDE 1 MG: 1 INJECTION, SOLUTION INTRAMUSCULAR; INTRAVENOUS; SUBCUTANEOUS at 07:09

## 2019-09-24 RX ADMIN — ONDANSETRON 4 MG: 2 INJECTION INTRAMUSCULAR; INTRAVENOUS at 11:09

## 2019-09-24 RX ADMIN — METHYLPREDNISOLONE SODIUM SUCCINATE 125 MG: 125 INJECTION, POWDER, FOR SOLUTION INTRAMUSCULAR; INTRAVENOUS at 07:09

## 2019-09-24 RX ADMIN — SODIUM CHLORIDE, SODIUM LACTATE, POTASSIUM CHLORIDE, AND CALCIUM CHLORIDE 1000 ML: .6; .31; .03; .02 INJECTION, SOLUTION INTRAVENOUS at 07:09

## 2019-09-24 RX ADMIN — METRONIDAZOLE 500 MG: 500 SOLUTION INTRAVENOUS at 10:09

## 2019-09-24 RX ADMIN — HYDROMORPHONE HYDROCHLORIDE 1 MG: 1 INJECTION, SOLUTION INTRAMUSCULAR; INTRAVENOUS; SUBCUTANEOUS at 08:09

## 2019-09-24 RX ADMIN — IOHEXOL 100 ML: 350 INJECTION, SOLUTION INTRAVENOUS at 08:09

## 2019-09-24 RX ADMIN — LORAZEPAM 1 MG: 2 INJECTION INTRAMUSCULAR; INTRAVENOUS at 10:09

## 2019-09-24 RX ADMIN — LEVOFLOXACIN 750 MG: 750 INJECTION, SOLUTION INTRAVENOUS at 11:09

## 2019-09-24 RX ADMIN — PROMETHAZINE HYDROCHLORIDE 12.5 MG: 25 INJECTION INTRAMUSCULAR; INTRAVENOUS at 08:09

## 2019-09-24 RX ADMIN — SODIUM CHLORIDE, SODIUM LACTATE, POTASSIUM CHLORIDE, AND CALCIUM CHLORIDE 1000 ML: .6; .31; .03; .02 INJECTION, SOLUTION INTRAVENOUS at 09:09

## 2019-09-24 RX ADMIN — ONDANSETRON 4 MG: 2 INJECTION INTRAMUSCULAR; INTRAVENOUS at 07:09

## 2019-09-24 RX ADMIN — DIPHENHYDRAMINE HYDROCHLORIDE 25 MG: 50 INJECTION, SOLUTION INTRAMUSCULAR; INTRAVENOUS at 07:09

## 2019-09-24 NOTE — ED PROVIDER NOTES
Encounter Date: 9/24/2019       History     Chief Complaint   Patient presents with    Abdominal Pain     44-year-old with history of depression, episodic back pain, gastroesophageal reflux, peptic ulcer disease, abdominal adhesions, endometriosis.  Patient recently seen evaluated emergency department found to have non obstructing kidney stones.  And was treated for urinary tract infection possible early pyelonephritis.  Patient was placed on Augmentin.  Patient presents to the emergency department with complaint of mid epigastric abdominal pain over the last 4 days.  Patient states her symptoms have persisted which has been associated with nausea vomiting which is described as nonbilious non bloody.  Patient states his pain is more anterior than flank at this time.  She states that food worsens symptoms. Patient has been vomiting throughout the day.  States has been persistently nauseated as well. Patient denies fever.  Patient denies fever.  Denies worsening back plain.  Denies flank pain. Denies vaginal bleeding, denies vaginal discharge.        Review of patient's allergies indicates:   Allergen Reactions    Corticosteroids (glucocorticoids)     Iodine and iodide containing products     Morphine     Stadol [butorphanol tartrate]      Past Medical History:   Diagnosis Date    Depression      No past surgical history on file.  No family history on file.  Social History     Tobacco Use    Smoking status: Current Every Day Smoker     Packs/day: 0.50   Substance Use Topics    Alcohol use: Not on file    Drug use: Not on file     Review of Systems   Constitutional: Positive for appetite change. Negative for chills, fatigue and fever.   HENT: Negative for congestion, postnasal drip, rhinorrhea, sinus pain and trouble swallowing.    Eyes: Negative for photophobia and visual disturbance.   Respiratory: Negative for chest tightness, shortness of breath and wheezing.    Cardiovascular: Negative for chest pain,  palpitations and leg swelling.   Gastrointestinal: Positive for abdominal pain, nausea and vomiting. Negative for blood in stool and diarrhea.   Endocrine: Negative for polydipsia, polyphagia and polyuria.   Genitourinary: Negative for dysuria, flank pain, frequency, hematuria, pelvic pain, urgency, vaginal bleeding and vaginal discharge.   Musculoskeletal: Negative for back pain and gait problem.   Skin: Negative for rash.   Neurological: Negative for tremors, weakness and numbness.   Hematological: Does not bruise/bleed easily.   Psychiatric/Behavioral: Negative for confusion.   All other systems reviewed and are negative.      Physical Exam     Initial Vitals [09/24/19 1706]   BP Pulse Resp Temp SpO2   (!) 156/72 110 20 98.9 °F (37.2 °C) 96 %      MAP       --         Physical Exam    Nursing note and vitals reviewed.  Constitutional: She appears well-developed and well-nourished.   HENT:   Head: Normocephalic and atraumatic.   Nose: Nose normal.   Mouth/Throat: Oropharynx is clear and moist.   Eyes: Conjunctivae and EOM are normal. Pupils are equal, round, and reactive to light.   Neck: Normal range of motion. Neck supple. No thyromegaly present. No tracheal deviation present.   Cardiovascular: Normal rate, regular rhythm, normal heart sounds and intact distal pulses. Exam reveals no gallop and no friction rub.    No murmur heard.  Pulmonary/Chest: Breath sounds normal. No stridor. No respiratory distress.   Abdominal: Soft. Bowel sounds are normal. She exhibits no mass. There is tenderness (Positive mid epigastric tenderness.). There is no rebound and no guarding.   Genitourinary: Vagina normal. No vaginal discharge found.   Musculoskeletal: Normal range of motion. She exhibits no edema.   Lymphadenopathy:     She has no cervical adenopathy.   Neurological: She is alert and oriented to person, place, and time. She has normal strength and normal reflexes. GCS score is 15. GCS eye subscore is 4. GCS verbal  subscore is 5. GCS motor subscore is 6.   Skin: Skin is warm and dry. Capillary refill takes less than 2 seconds.   Psychiatric: She has a normal mood and affect.         ED Course   Procedures  Labs Reviewed   CBC W/ AUTO DIFFERENTIAL - Abnormal; Notable for the following components:       Result Value    WBC 16.27 (*)     RBC 3.76 (*)     Mean Corpuscular Volume 100 (*)     Mean Corpuscular Hemoglobin 33.2 (*)     Platelets 358 (*)     Immature Granulocytes 2.6 (*)     Gran # (ANC) 11.9 (*)     Immature Grans (Abs) 0.43 (*)     All other components within normal limits   COMPREHENSIVE METABOLIC PANEL - Abnormal; Notable for the following components:    Sodium 135 (*)     Potassium 3.4 (*)     Calcium 8.4 (*)     Albumin 2.7 (*)     ALT 9 (*)     All other components within normal limits   LIPASE   URINALYSIS, REFLEX TO URINE CULTURE   AMYLASE   SEDIMENTATION RATE   C-REACTIVE PROTEIN   DRUG SCREEN PANEL, URINE EMERGENCY   ISTAT LACTATE   POCT LACTATE          Imaging Results    None       X-Rays:   Independently Interpreted Readings:   Other Readings:  CT of the abdomen showed evidence of colitis    Medical Decision Making:   Initial Assessment:   44-year-old female with diffuse mid epigastric abdominal pain associated with nausea vomiting over the last 4 days worsen in nature.  Differential Diagnosis:   Pancreatitis, colitis, small-bowel obstruction, ileus, peptic ulcer disease, gastritis.  Patient with intractable pain and failing outpatient treatment presented emergency department with worsening pain and nausea and vomiting. Patient noted to be hypokalemic.  Hypokalemia treated.  ESR and CRP elevated.  CT scan shows evidence of colitis.  IV antibiotics given.  P given patient's intractable pain and nausea and vomiting, and failing outpatient treatment, Hospital Medicine consulted for evaluation for further management.  Clinical Tests:   Lab Tests: Reviewed  Radiological Study: Reviewed  Medical Tests:  Reviewed                     ED Course as of Sep 24 1935   Tue Sep 24, 2019   1707 Abdominal pain diffuse with vomiting    [KN]      ED Course User Index  [KN] Caprice Louis NP     Clinical Impression:       ICD-10-CM ICD-9-CM   1. Epigastric pain R10.13 789.06   2. Abdominal pain R10.9 789.00     Colitis  Intractable pain and nausea and vomiting                           Walker Schofield MD  09/24/19 5547

## 2019-09-24 NOTE — ED NOTES
Patient states that she was seen in this Ed last week and was diagnosed with UTI, pain continuing with pain worsening today. Denies any urinary complaints. Will continue to monitor.

## 2019-09-24 NOTE — ED TRIAGE NOTES
Pt states diffuse abd pain x 1 week. States she was diagnosed with pylonephritis a week ago and prescribed antibiotics.

## 2019-09-25 PROBLEM — E87.6 HYPOKALEMIA: Status: ACTIVE | Noted: 2019-09-25

## 2019-09-25 LAB
ALBUMIN SERPL BCP-MCNC: 2.5 G/DL (ref 3.5–5.2)
ALP SERPL-CCNC: 85 U/L (ref 55–135)
ALT SERPL W/O P-5'-P-CCNC: 10 U/L (ref 10–44)
ANION GAP SERPL CALC-SCNC: 8 MMOL/L (ref 8–16)
AST SERPL-CCNC: 9 U/L (ref 10–40)
BASOPHILS # BLD AUTO: 0.06 K/UL (ref 0–0.2)
BASOPHILS NFR BLD: 0.4 % (ref 0–1.9)
BILIRUB SERPL-MCNC: 0.5 MG/DL (ref 0.1–1)
BUN SERPL-MCNC: 10 MG/DL (ref 6–20)
CALCIUM SERPL-MCNC: 8.5 MG/DL (ref 8.7–10.5)
CHLORIDE SERPL-SCNC: 101 MMOL/L (ref 95–110)
CO2 SERPL-SCNC: 28 MMOL/L (ref 23–29)
CREAT SERPL-MCNC: 0.5 MG/DL (ref 0.5–1.4)
DIFFERENTIAL METHOD: ABNORMAL
EOSINOPHIL # BLD AUTO: 0 K/UL (ref 0–0.5)
EOSINOPHIL NFR BLD: 0 % (ref 0–8)
ERYTHROCYTE [DISTWIDTH] IN BLOOD BY AUTOMATED COUNT: 11.9 % (ref 11.5–14.5)
EST. GFR  (AFRICAN AMERICAN): >60 ML/MIN/1.73 M^2
EST. GFR  (NON AFRICAN AMERICAN): >60 ML/MIN/1.73 M^2
GLUCOSE SERPL-MCNC: 134 MG/DL (ref 70–110)
HCT VFR BLD AUTO: 37.7 % (ref 37–48.5)
HGB BLD-MCNC: 12.1 G/DL (ref 12–16)
IMM GRANULOCYTES # BLD AUTO: 0.46 K/UL (ref 0–0.04)
IMM GRANULOCYTES NFR BLD AUTO: 3.2 % (ref 0–0.5)
LYMPHOCYTES # BLD AUTO: 1.8 K/UL (ref 1–4.8)
LYMPHOCYTES NFR BLD: 12.4 % (ref 18–48)
MCH RBC QN AUTO: 33.5 PG (ref 27–31)
MCHC RBC AUTO-ENTMCNC: 32.1 G/DL (ref 32–36)
MCV RBC AUTO: 104 FL (ref 82–98)
MONOCYTES # BLD AUTO: 0.3 K/UL (ref 0.3–1)
MONOCYTES NFR BLD: 1.9 % (ref 4–15)
NEUTROPHILS # BLD AUTO: 11.9 K/UL (ref 1.8–7.7)
NEUTROPHILS NFR BLD: 82.1 % (ref 38–73)
NRBC BLD-RTO: 0 /100 WBC
PLATELET # BLD AUTO: 339 K/UL (ref 150–350)
PMV BLD AUTO: 9.3 FL (ref 9.2–12.9)
POTASSIUM SERPL-SCNC: 3.9 MMOL/L (ref 3.5–5.1)
PROT SERPL-MCNC: 6.4 G/DL (ref 6–8.4)
RBC # BLD AUTO: 3.61 M/UL (ref 4–5.4)
SODIUM SERPL-SCNC: 137 MMOL/L (ref 136–145)
WBC # BLD AUTO: 14.66 K/UL (ref 3.9–12.7)

## 2019-09-25 PROCEDURE — 96367 TX/PROPH/DG ADDL SEQ IV INF: CPT

## 2019-09-25 PROCEDURE — 25000003 PHARM REV CODE 250: Performed by: HOSPITALIST

## 2019-09-25 PROCEDURE — 96375 TX/PRO/DX INJ NEW DRUG ADDON: CPT

## 2019-09-25 PROCEDURE — 63600175 PHARM REV CODE 636 W HCPCS: Performed by: HOSPITALIST

## 2019-09-25 PROCEDURE — 25000003 PHARM REV CODE 250: Performed by: INTERNAL MEDICINE

## 2019-09-25 PROCEDURE — 94761 N-INVAS EAR/PLS OXIMETRY MLT: CPT

## 2019-09-25 PROCEDURE — 80053 COMPREHEN METABOLIC PANEL: CPT

## 2019-09-25 PROCEDURE — G0378 HOSPITAL OBSERVATION PER HR: HCPCS

## 2019-09-25 PROCEDURE — 85025 COMPLETE CBC W/AUTO DIFF WBC: CPT

## 2019-09-25 PROCEDURE — 63600175 PHARM REV CODE 636 W HCPCS: Performed by: INTERNAL MEDICINE

## 2019-09-25 PROCEDURE — 36415 COLL VENOUS BLD VENIPUNCTURE: CPT

## 2019-09-25 PROCEDURE — S0030 INJECTION, METRONIDAZOLE: HCPCS | Performed by: HOSPITALIST

## 2019-09-25 PROCEDURE — 96376 TX/PRO/DX INJ SAME DRUG ADON: CPT

## 2019-09-25 RX ORDER — ACETAMINOPHEN 325 MG/1
650 TABLET ORAL EVERY 6 HOURS PRN
Status: DISCONTINUED | OUTPATIENT
Start: 2019-09-25 | End: 2019-09-26 | Stop reason: HOSPADM

## 2019-09-25 RX ORDER — QUETIAPINE FUMARATE 25 MG/1
50 TABLET, FILM COATED ORAL ONCE
Status: COMPLETED | OUTPATIENT
Start: 2019-09-25 | End: 2019-09-25

## 2019-09-25 RX ORDER — HYDROCODONE BITARTRATE AND ACETAMINOPHEN 10; 325 MG/1; MG/1
1 TABLET ORAL EVERY 6 HOURS PRN
Status: DISCONTINUED | OUTPATIENT
Start: 2019-09-25 | End: 2019-09-26 | Stop reason: HOSPADM

## 2019-09-25 RX ORDER — ONDANSETRON 2 MG/ML
4 INJECTION INTRAMUSCULAR; INTRAVENOUS EVERY 4 HOURS PRN
Status: DISCONTINUED | OUTPATIENT
Start: 2019-09-25 | End: 2019-09-26 | Stop reason: HOSPADM

## 2019-09-25 RX ADMIN — METRONIDAZOLE 500 MG: 500 SOLUTION INTRAVENOUS at 06:09

## 2019-09-25 RX ADMIN — QUETIAPINE 50 MG: 25 TABLET ORAL at 11:09

## 2019-09-25 RX ADMIN — ONDANSETRON 4 MG: 2 INJECTION INTRAMUSCULAR; INTRAVENOUS at 11:09

## 2019-09-25 RX ADMIN — BUPROPION HYDROCHLORIDE 150 MG: 150 TABLET, FILM COATED, EXTENDED RELEASE ORAL at 08:09

## 2019-09-25 RX ADMIN — METRONIDAZOLE 500 MG: 500 SOLUTION INTRAVENOUS at 11:09

## 2019-09-25 RX ADMIN — HYDROCODONE BITARTRATE AND ACETAMINOPHEN 1 TABLET: 10; 325 TABLET ORAL at 12:09

## 2019-09-25 RX ADMIN — QUETIAPINE 50 MG: 25 TABLET ORAL at 08:09

## 2019-09-25 RX ADMIN — ONDANSETRON 4 MG: 2 INJECTION INTRAMUSCULAR; INTRAVENOUS at 08:09

## 2019-09-25 RX ADMIN — ONDANSETRON 4 MG: 2 INJECTION INTRAMUSCULAR; INTRAVENOUS at 06:09

## 2019-09-25 RX ADMIN — HYDROCODONE BITARTRATE AND ACETAMINOPHEN 1 TABLET: 10; 325 TABLET ORAL at 06:09

## 2019-09-25 RX ADMIN — HYDROMORPHONE HYDROCHLORIDE 0.5 MG: 1 INJECTION, SOLUTION INTRAMUSCULAR; INTRAVENOUS; SUBCUTANEOUS at 12:09

## 2019-09-25 RX ADMIN — QUETIAPINE 50 MG: 25 TABLET ORAL at 12:09

## 2019-09-25 RX ADMIN — HYDROMORPHONE HYDROCHLORIDE 0.5 MG: 1 INJECTION, SOLUTION INTRAMUSCULAR; INTRAVENOUS; SUBCUTANEOUS at 04:09

## 2019-09-25 RX ADMIN — METRONIDAZOLE 500 MG: 500 SOLUTION INTRAVENOUS at 04:09

## 2019-09-25 RX ADMIN — HYDROMORPHONE HYDROCHLORIDE 0.5 MG: 1 INJECTION, SOLUTION INTRAMUSCULAR; INTRAVENOUS; SUBCUTANEOUS at 08:09

## 2019-09-25 RX ADMIN — ONDANSETRON 4 MG: 2 INJECTION INTRAMUSCULAR; INTRAVENOUS at 12:09

## 2019-09-25 RX ADMIN — QUETIAPINE 50 MG: 25 TABLET ORAL at 02:09

## 2019-09-25 NOTE — SUBJECTIVE & OBJECTIVE
Interval History: Abdominal pain POA- diffuse, persistent, moderately severe.  Associated with N/V and loose stool.  Reports no further vomiting but still nauseated.  Had loose stool last night.     Review of Systems   Constitutional: Negative.    HENT: Negative.    Eyes: Negative.    Respiratory: Negative.    Cardiovascular: Negative.    Gastrointestinal: Positive for abdominal pain, diarrhea, nausea and vomiting.   Endocrine: Negative.    Genitourinary: Negative.    Musculoskeletal: Negative.    Skin: Negative.    Allergic/Immunologic: Negative.    Neurological: Negative.    Hematological: Negative.    Psychiatric/Behavioral: Negative.      Objective:     Vital Signs (Most Recent):  Temp: 97.6 °F (36.4 °C) (09/25/19 1600)  Pulse: 101 (09/25/19 1600)  Resp: 18 (09/25/19 1600)  BP: 115/69 (09/25/19 1600)  SpO2: 98 % (09/25/19 1600) Vital Signs (24h Range):  Temp:  [97.6 °F (36.4 °C)-98.8 °F (37.1 °C)] 97.6 °F (36.4 °C)  Pulse:  [] 101  Resp:  [14-27] 18  SpO2:  [93 %-99 %] 98 %  BP: (106-137)/(56-89) 115/69     Weight: 54.4 kg (120 lb)  Body mass index is 22.67 kg/m².    Intake/Output Summary (Last 24 hours) at 9/25/2019 1739  Last data filed at 9/25/2019 0003  Gross per 24 hour   Intake 2840 ml   Output --   Net 2840 ml      Physical Exam   Constitutional: She is oriented to person, place, and time. She appears well-developed. No distress.   Fidgeting versus mildly tremulous    HENT:   Head: Normocephalic and atraumatic.   Mouth/Throat: Oropharynx is clear and moist.   Eyes: Pupils are equal, round, and reactive to light. EOM are normal.   Neck: Normal range of motion.   Cardiovascular: Regular rhythm.   No murmur heard.  Pulmonary/Chest: Effort normal and breath sounds normal. She has no wheezes.   Abdominal: Soft. She exhibits no distension. There is tenderness. There is no rebound and no guarding.   Patient reports diffuse tenderness to palpation but I was able to palpate well without any signs of  distress.  Non distended.   Musculoskeletal: Normal range of motion.   Neurological: She is alert and oriented to person, place, and time. No cranial nerve deficit or sensory deficit.   Skin: No rash noted.   Psychiatric: She has a normal mood and affect.   Nursing note and vitals reviewed.      Significant Labs:   CBC:   Recent Labs   Lab 09/24/19  1720 09/25/19  0802   WBC 16.27* 14.66*   HGB 12.5 12.1   HCT 37.5 37.7   * 339     CMP:   Recent Labs   Lab 09/24/19  1720 09/25/19  0802   * 137   K 3.4* 3.9   CL 99 101   CO2 27 28    134*   BUN 10 10   CREATININE 0.6 0.5   CALCIUM 8.4* 8.5*   PROT 6.6 6.4   ALBUMIN 2.7* 2.5*   BILITOT 0.3 0.5   ALKPHOS 97 85   AST 10 9*   ALT 9* 10   ANIONGAP 9 8   EGFRNONAA >60.0 >60.0       Significant Imaging: KUB:  None obstructive bowel gas pattern

## 2019-09-25 NOTE — H&P
[facility]  History & Physical    DOS: 09/24/2019  Time: 10:00pm    Patient Name: Natividad Acosta  MRN: [id number]  Admission Date: 9/24/2019  Attending Physician: Gisell Zendejas MD   Primary Care Provider: Primary Doctor No         Patient information was obtained from patient and ER records.     Subjective:     Principal Problem:Colitis    Chief Complaint:   Chief Complaint   Patient presents with    Abdominal Pain        HPI: 44-year-old lady with past medical history of anxiety/depression disorder presented with chief complaint of abdominal pain. Upon further asking patient mentioned that she was in her usual state of health until about 1 week ago when she started experiencing generalized abdominal pain and back pain and she visited the ED.  At that time workup did not reveal any organic etiology and she was sent home however over the week her symptoms progressively got worse to a point when she had multiple episodes of emesis and she was not able to tolerate anything p.o. her pain is more generalized however slightly more pronounced in left quadrants.  Patient appears very anxious and tremulous.  Patient and her  give very conflicting stories about her past medical history as well as medication use.   also mentioned that she smokes THC and CBD products.  She takes Wellbutrin, Seroquel frequently runs out of medications.   says she had a history of seizure all her life however she is not on any antiepileptics?    Past medical history:  As mentioned above, questionable seizure disorder  Family history:  Reviewed and noncontributory  Social history:  More than 20 pack year smoking history, currently smokes 1 pack every day, denies any alcohol use, admits smoking CBD and THC  Surgical history:  C-sections    Past Medical History:   Diagnosis Date    Depression        No past surgical history on file.    Review of patient's allergies indicates:    Allergen Reactions    Corticosteroids (glucocorticoids)     Iodine and iodide containing products     Morphine     Stadol [butorphanol tartrate]        No current facility-administered medications on file prior to encounter.      Current Outpatient Medications on File Prior to Encounter   Medication Sig    amoxicillin-clavulanate 875-125mg (AUGMENTIN) 875-125 mg per tablet Take 1 tablet by mouth every 12 (twelve) hours. For 10 days    buPROPion (WELLBUTRIN XL) 150 MG TB24 tablet Take 150 mg by mouth 2 (two) times daily.     diclofenac sodium (VOLTAREN) 1 % Gel Apply 2 g topically once as needed.    QUEtiapine (SEROQUEL) 100 MG Tab Take 100 mg by mouth 3 (three) times daily.    UNKNOWN TO PATIENT Inject as directed every 30 days.    estrogens, conjugated (CONJUGATED ESTROGENS INJ) Inject as directed.     Family History     None        Tobacco Use    Smoking status: Current Every Day Smoker     Packs/day: 0.50   Substance and Sexual Activity    Alcohol use: Not on file    Drug use: Not on file    Sexual activity: Not on file     Review of Systems   Constitutional: Positive for chills. Negative for fever.   HENT: Negative for sore throat.    Eyes: Negative for redness and itching.   Respiratory: Negative for chest tightness and shortness of breath.    Cardiovascular: Negative for chest pain and palpitations.   Gastrointestinal: Positive for abdominal distention, abdominal pain, nausea and vomiting. Negative for blood in stool.   Genitourinary: Negative for dysuria.   Musculoskeletal: Negative for gait problem and joint swelling.   Neurological: Positive for tremors. Negative for weakness.   Psychiatric/Behavioral: Negative for behavioral problems and sleep disturbance. The patient is nervous/anxious and is hyperactive.    All other systems reviewed and are negative.    Objective:     Vital Signs (Most Recent):  Temp: 98.9 °F (37.2 °C) (09/24/19 1706)  Pulse: 96 (09/24/19 2130)  Resp: (!) 27 (09/24/19  1800)  BP: 124/71 (09/24/19 2130)  SpO2: (!) 93 % (09/24/19 2130) Vital Signs (24h Range):  Temp:  [98.9 °F (37.2 °C)] 98.9 °F (37.2 °C)  Pulse:  [] 96  Resp:  [20-27] 27  SpO2:  [93 %-96 %] 93 %  BP: (122-156)/(71-89) 124/71     Weight: 56.2 kg (124 lb)  Body mass index is 23.43 kg/m².    Physical Exam   Constitutional: She is oriented to person, place, and time. She appears well-developed and well-nourished.   Very anxious and tremulous   HENT:   Head: Atraumatic.   Mouth/Throat: Oropharynx is clear and moist.   Neck: Neck supple. No JVD present.   Cardiovascular: Normal rate, regular rhythm and normal heart sounds. Exam reveals no gallop.   No murmur heard.  Pulmonary/Chest: Breath sounds normal. No respiratory distress. She has no wheezes.   Abdominal: Soft. Bowel sounds are normal. She exhibits no distension. There is no rebound and no guarding.   Globally tender however more pronounced in left quadrants, no guarding or rigidity   Musculoskeletal: She exhibits no edema.   Lymphadenopathy:     She has no cervical adenopathy.   Neurological: She is alert and oriented to person, place, and time. No cranial nerve deficit.   Skin: Skin is warm. Capillary refill takes less than 2 seconds. No rash noted. She is diaphoretic.   Psychiatric: Her behavior is normal.   Nursing note and vitals reviewed.          Significant Labs: All pertinent labs within the past 24 hours have been reviewed.    Significant Imaging: I have reviewed all pertinent imaging results/findings within the past 24 hours.    Assessment/Plan:     * Colitis  Leukocytosis, elevated ESR, CT findings suggestive of colitis involving descending colon and sigmoid colon  Levaquin and Flagyl  Clear liquid diet, advanced as tolerated  P.r.n. antiemetics and analgesia  Repeat KUB      Anxiety disorder  Appears very anxious  Resumed home medications      Tobacco dependence due to cigarettes  Heavy smoking dependence  Nicotine patch  Counseled extensively on  smoking cessation        VTE Risk Mitigation (From admission, onward)         Ordered     Place sequential compression device  Until discontinued      09/24/19 2234     IP VTE LOW RISK PATIENT  Once      09/24/19 2234                   Gisell Zendejas MD  Department of Hospital Medicine   [facility]

## 2019-09-25 NOTE — ASSESSMENT & PLAN NOTE
Leukocytosis, elevated ESR, CT findings suggestive of colitis involving descending colon and sigmoid colon  Levaquin and Flagyl  Clear liquid diet, advanced as tolerated  P.r.n. antiemetics and analgesia  Repeat KUB

## 2019-09-25 NOTE — NURSING
Informed Dr. Mukesh Osborn about patient with complaints of increased nausea. Telephone order noted. Zofran 4mg IV every 4 hours PRN.

## 2019-09-25 NOTE — SUBJECTIVE & OBJECTIVE
Past Medical History:   Diagnosis Date    Depression        No past surgical history on file.    Review of patient's allergies indicates:   Allergen Reactions    Corticosteroids (glucocorticoids)     Iodine and iodide containing products     Morphine     Stadol [butorphanol tartrate]        No current facility-administered medications on file prior to encounter.      Current Outpatient Medications on File Prior to Encounter   Medication Sig    amoxicillin-clavulanate 875-125mg (AUGMENTIN) 875-125 mg per tablet Take 1 tablet by mouth every 12 (twelve) hours. For 10 days    buPROPion (WELLBUTRIN XL) 150 MG TB24 tablet Take 150 mg by mouth 2 (two) times daily.     diclofenac sodium (VOLTAREN) 1 % Gel Apply 2 g topically once as needed.    QUEtiapine (SEROQUEL) 100 MG Tab Take 100 mg by mouth 3 (three) times daily.    UNKNOWN TO PATIENT Inject as directed every 30 days.    estrogens, conjugated (CONJUGATED ESTROGENS INJ) Inject as directed.     Family History     None        Tobacco Use    Smoking status: Current Every Day Smoker     Packs/day: 0.50   Substance and Sexual Activity    Alcohol use: Not on file    Drug use: Not on file    Sexual activity: Not on file     Review of Systems   Constitutional: Positive for chills. Negative for fever.   HENT: Negative for sore throat.    Eyes: Negative for redness and itching.   Respiratory: Negative for chest tightness and shortness of breath.    Cardiovascular: Negative for chest pain and palpitations.   Gastrointestinal: Positive for abdominal distention, abdominal pain, nausea and vomiting. Negative for blood in stool.   Genitourinary: Negative for dysuria.   Musculoskeletal: Negative for gait problem and joint swelling.   Neurological: Positive for tremors. Negative for weakness.   Psychiatric/Behavioral: Negative for behavioral problems and sleep disturbance. The patient is nervous/anxious and is hyperactive.    All other systems reviewed and are  negative.    Objective:     Vital Signs (Most Recent):  Temp: 98.9 °F (37.2 °C) (09/24/19 1706)  Pulse: 96 (09/24/19 2130)  Resp: (!) 27 (09/24/19 1800)  BP: 124/71 (09/24/19 2130)  SpO2: (!) 93 % (09/24/19 2130) Vital Signs (24h Range):  Temp:  [98.9 °F (37.2 °C)] 98.9 °F (37.2 °C)  Pulse:  [] 96  Resp:  [20-27] 27  SpO2:  [93 %-96 %] 93 %  BP: (122-156)/(71-89) 124/71     Weight: 56.2 kg (124 lb)  Body mass index is 23.43 kg/m².    Physical Exam   Constitutional: She is oriented to person, place, and time. She appears well-developed and well-nourished.   Very anxious and tremulous   HENT:   Head: Atraumatic.   Mouth/Throat: Oropharynx is clear and moist.   Neck: Neck supple. No JVD present.   Cardiovascular: Normal rate, regular rhythm and normal heart sounds. Exam reveals no gallop.   No murmur heard.  Pulmonary/Chest: Breath sounds normal. No respiratory distress. She has no wheezes.   Abdominal: Soft. Bowel sounds are normal. She exhibits no distension. There is no rebound and no guarding.   Globally tender however more pronounced in left quadrants, no guarding or rigidity   Musculoskeletal: She exhibits no edema.   Lymphadenopathy:     She has no cervical adenopathy.   Neurological: She is alert and oriented to person, place, and time. No cranial nerve deficit.   Skin: Skin is warm. Capillary refill takes less than 2 seconds. No rash noted. She is diaphoretic.   Psychiatric: Her behavior is normal.   Nursing note and vitals reviewed.          Significant Labs: All pertinent labs within the past 24 hours have been reviewed.    Significant Imaging: I have reviewed all pertinent imaging results/findings within the past 24 hours.

## 2019-09-25 NOTE — HOSPITAL COURSE
Patient admitted with abdominal pain, N/V and loose stool.  Imaging consistent with colitis. IV abx started.  She has been tolerating po medication and liquids though still having some nausesa and pain.  Changed pain medication from IV to po to ensure she is going to tolerate.  Patient reportedly had a bad night via history obtained from RN as well as patient herself.  She requested IV pain medication but this was declined by the Night MD who was following my wishes of continuing with po pain medication as patient was not going to be able to go home on IV pain medication and other clinical signs pointed to improvement in symptoms.  Speaking to the patient, she reports her abdominal pain, which had originally brought her in, had greatly improved and now she was having more severe flank pain.  Review of old records shows multiple ED and hospital visits for flank pain and hematuria with concerns for passing a stone but CT imaging has revealed no ureteral stones.  She also gave history of ureteral stricture.  Patient reports she was vomiting overnight as well as starting to urinate blood and she felt strongly she was passing a kidney stone.  I repeated labs and her CT scan which were both unrevealing of new or worsening condition.  Repeat CT abd showed known right kidney stone and no evidence of ureteral stones.  There was no hydronephrosis to suggest obstruction from stricture. She has been afebrile and BCx NGTD.  CT did indicate colitis but no worsening from admission CT.  She has not had any more loose stool since admission to provide a stool sample. Upon my re evaluation of patient, I found her up in her room, dressed, brushing her hair and appearing more comfortable than this morning.  I went over results of labs and repeat CT scans.  I believe she is having some degree of pain from colitis but I also believe this is improving.  She may have a second process which seems to be this constellation of symptoms of flank  pain, hematuria.  UA on admission with no blood in her urine.  Her previous workups have been unrevealing.  I discussed that I did not have an answer at this tie either based on this workup.  I did have case management arrange for an access health appointment and discussed with her calling medicaid to see what Urologist takes her insurance.  I did discuss that just because our inpatient workup had ended, this was not the end of the road, but she would need to get into outpatient clinics to take the next step.  Abd is soft and benign on exam.  No guarding or rebound. Vitals are reassuring.  I believe patient is safe for discharge home to complete outpatient po abx.  Rx for pain medication and antiemetic also provided.  She reports vomiting overnight but appears comfortable at time of discharge and tolerating po.  Return precautions given.

## 2019-09-25 NOTE — ASSESSMENT & PLAN NOTE
Leukocytosis, elevated ESR, CT findings suggestive of colitis involving descending colon and sigmoid colon  Levaquin and Flagyl  Clear liquid diet, advanced as tolerated  P.r.n. antiemetics and analgesia  Repeat KUB with no obstruction

## 2019-09-25 NOTE — NURSING
Patient arrived to unit from ED, transferred by primary ED nurse. Patient is AAO x's 4, reports 8/10 abdominal pain.

## 2019-09-25 NOTE — PROGRESS NOTES
[facility]  Progress Note    Patient Name: Natividad Acosta  MRN: [id number]  Patient Class: OP- Observation   Admission Date: 9/24/2019  Length of Stay: 0 days  Attending Physician: Ema Osborn MD  Primary Care Provider: Primary Doctor No        Subjective:     Principal Problem:Colitis        HPI:  44-year-old lady with past medical history of anxiety/depression disorder presented with chief complaint of abdominal pain. Upon further asking patient mentioned that she was in her usual state of health until about 1 week ago when she started experiencing generalized abdominal pain and back pain and she visited the ED.  At that time workup did not reveal any organic etiology and she was sent home however over the week her symptoms progressively got worse to a point when she had multiple episodes of emesis and she was not able to tolerate anything p.o. her pain is more generalized however slightly more pronounced in left quadrants.  Patient appears very anxious and tremulous.  Patient and her  give very conflicting stories about her past medical history as well as medication use.   also mentioned that she smokes THC and CBD products.  She takes Wellbutrin, Seroquel frequently runs out of medications.   says she had a history of seizure all her life however she is not on any antiepileptics?    Past medical history:  As mentioned above, questionable seizure disorder  Family history:  Reviewed and noncontributory  Social history:  More than 20 pack year smoking history, currently smokes 1 pack every day, denies any alcohol use, admits smoking CBD and THC  Surgical history:  C-sections    Overview/Hospital Course:  Patient admitted with abdominal pain, N/V and loose stool.  Imaging consistent with colitis. IV abx started.  She has been tolerating po medication and liquids though still having some nausesa and pain.  Changed pain medication from IV to po to  ensure she is going to tolerate.      Interval History: Abdominal pain POA- diffuse, persistent, moderately severe.  Associated with N/V and loose stool.  Reports no further vomiting but still nauseated.  Had loose stool last night.     Review of Systems   Constitutional: Negative.    HENT: Negative.    Eyes: Negative.    Respiratory: Negative.    Cardiovascular: Negative.    Gastrointestinal: Positive for abdominal pain, diarrhea, nausea and vomiting.   Endocrine: Negative.    Genitourinary: Negative.    Musculoskeletal: Negative.    Skin: Negative.    Allergic/Immunologic: Negative.    Neurological: Negative.    Hematological: Negative.    Psychiatric/Behavioral: Negative.      Objective:     Vital Signs (Most Recent):  Temp: 97.6 °F (36.4 °C) (09/25/19 1600)  Pulse: 101 (09/25/19 1600)  Resp: 18 (09/25/19 1600)  BP: 115/69 (09/25/19 1600)  SpO2: 98 % (09/25/19 1600) Vital Signs (24h Range):  Temp:  [97.6 °F (36.4 °C)-98.8 °F (37.1 °C)] 97.6 °F (36.4 °C)  Pulse:  [] 101  Resp:  [14-27] 18  SpO2:  [93 %-99 %] 98 %  BP: (106-137)/(56-89) 115/69     Weight: 54.4 kg (120 lb)  Body mass index is 22.67 kg/m².    Intake/Output Summary (Last 24 hours) at 9/25/2019 1739  Last data filed at 9/25/2019 0003  Gross per 24 hour   Intake 2840 ml   Output --   Net 2840 ml      Physical Exam   Constitutional: She is oriented to person, place, and time. She appears well-developed. No distress.   Fidgeting versus mildly tremulous    HENT:   Head: Normocephalic and atraumatic.   Mouth/Throat: Oropharynx is clear and moist.   Eyes: Pupils are equal, round, and reactive to light. EOM are normal.   Neck: Normal range of motion.   Cardiovascular: Regular rhythm.   No murmur heard.  Pulmonary/Chest: Effort normal and breath sounds normal. She has no wheezes.   Abdominal: Soft. She exhibits no distension. There is tenderness. There is no rebound and no guarding.   Patient reports diffuse tenderness to palpation but I was able to  palpate well without any signs of distress.  Non distended.   Musculoskeletal: Normal range of motion.   Neurological: She is alert and oriented to person, place, and time. No cranial nerve deficit or sensory deficit.   Skin: No rash noted.   Psychiatric: She has a normal mood and affect.   Nursing note and vitals reviewed.      Significant Labs:   CBC:   Recent Labs   Lab 09/24/19  1720 09/25/19  0802   WBC 16.27* 14.66*   HGB 12.5 12.1   HCT 37.5 37.7   * 339     CMP:   Recent Labs   Lab 09/24/19  1720 09/25/19  0802   * 137   K 3.4* 3.9   CL 99 101   CO2 27 28    134*   BUN 10 10   CREATININE 0.6 0.5   CALCIUM 8.4* 8.5*   PROT 6.6 6.4   ALBUMIN 2.7* 2.5*   BILITOT 0.3 0.5   ALKPHOS 97 85   AST 10 9*   ALT 9* 10   ANIONGAP 9 8   EGFRNONAA >60.0 >60.0       Significant Imaging: KUB:  None obstructive bowel gas pattern      Assessment/Plan:      * Colitis  Leukocytosis, elevated ESR, CT findings suggestive of colitis involving descending colon and sigmoid colon  Levaquin and Flagyl  Clear liquid diet, advanced as tolerated  P.r.n. antiemetics and analgesia  Repeat KUB with no obstruction      Hypokalemia  Replacing. Monitoring.       Tobacco dependence due to cigarettes  Heavy smoking dependence  Nicotine patch  Counseled extensively on smoking cessation      Anxiety disorder  Appears very anxious  Resumed home medications        VTE Risk Mitigation (From admission, onward)         Ordered     Place sequential compression device  Until discontinued      09/24/19 2234     IP VTE LOW RISK PATIENT  Once      09/24/19 2234                      Ema Osborn MD  Department of Hospital Medicine   [facility]

## 2019-09-25 NOTE — PLAN OF CARE
Problem: Adult Inpatient Plan of Care  Goal: Plan of Care Review  Outcome: Ongoing, Progressing  Goal: Patient-Specific Goal (Individualization)  Outcome: Ongoing, Progressing  Goal: Absence of Hospital-Acquired Illness or Injury  Outcome: Ongoing, Progressing  Goal: Optimal Comfort and Wellbeing  Outcome: Ongoing, Progressing  Goal: Readiness for Transition of Care  Outcome: Ongoing, Progressing  Goal: Rounds/Family Conference  Outcome: Ongoing, Progressing     Problem: Fall Injury Risk  Goal: Absence of Fall and Fall-Related Injury  Outcome: Ongoing, Progressing     Problem: Pain Acute  Goal: Optimal Pain Control  Outcome: Ongoing, Progressing

## 2019-09-25 NOTE — PLAN OF CARE
09/25/19 0156   Patient Assessment/Suction   Level of Consciousness (AVPU) alert   Respiratory Effort Normal;Unlabored   Expansion/Accessory Muscles/Retractions diaphragmatic;expansion symmetric;no use of accessory muscles   All Lung Fields Breath Sounds clear   Cough Type dry;good;nonproductive   PRE-TX-O2   O2 Device (Oxygen Therapy) room air   SpO2 96 %   Pulse Oximetry Type Intermittent   $ Pulse Oximetry - Multiple Charge Pulse Oximetry - Multiple   Pulse 103   Resp 14   Respiratory Interventions   Cough And Deep Breathing done with encouragement   Instructed and encouraged deep diaphragmatic breathing and cough exercises.

## 2019-09-25 NOTE — HPI
44-year-old lady with past medical history of anxiety/depression disorder presented with chief complaint of abdominal pain. Upon further asking patient mentioned that she was in her usual state of health until about 1 week ago when she started experiencing generalized abdominal pain and back pain and she visited the ED.  At that time workup did not reveal any organic etiology and she was sent home however over the week her symptoms progressively got worse to a point when she had multiple episodes of emesis and she was not able to tolerate anything p.o. her pain is more generalized however slightly more pronounced in left quadrants.  Patient appears very anxious and tremulous.  Patient and her  give very conflicting stories about her past medical history as well as medication use.   also mentioned that she smokes THC and CBD products.  She takes Wellbutrin, Seroquel frequently runs out of medications.   says she had a history of seizure all her life however she is not on any antiepileptics?    Past medical history:  As mentioned above, questionable seizure disorder  Family history:  Reviewed and noncontributory  Social history:  More than 20 pack year smoking history, currently smokes 1 pack every day, denies any alcohol use, admits smoking CBD and THC  Surgical history:  C-sections

## 2019-09-26 VITALS
DIASTOLIC BLOOD PRESSURE: 76 MMHG | SYSTOLIC BLOOD PRESSURE: 113 MMHG | TEMPERATURE: 98 F | WEIGHT: 120 LBS | BODY MASS INDEX: 22.66 KG/M2 | HEIGHT: 61 IN | OXYGEN SATURATION: 96 % | RESPIRATION RATE: 18 BRPM | HEART RATE: 107 BPM

## 2019-09-26 LAB
ALBUMIN SERPL BCP-MCNC: 2.4 G/DL (ref 3.5–5.2)
ALP SERPL-CCNC: 61 U/L (ref 55–135)
ALT SERPL W/O P-5'-P-CCNC: 10 U/L (ref 10–44)
ANION GAP SERPL CALC-SCNC: 8 MMOL/L (ref 8–16)
AST SERPL-CCNC: 10 U/L (ref 10–40)
BASOPHILS # BLD AUTO: 0.04 K/UL (ref 0–0.2)
BASOPHILS NFR BLD: 0.3 % (ref 0–1.9)
BILIRUB SERPL-MCNC: 0.5 MG/DL (ref 0.1–1)
BUN SERPL-MCNC: 11 MG/DL (ref 6–20)
CALCIUM SERPL-MCNC: 7.7 MG/DL (ref 8.7–10.5)
CHLORIDE SERPL-SCNC: 99 MMOL/L (ref 95–110)
CO2 SERPL-SCNC: 29 MMOL/L (ref 23–29)
CREAT SERPL-MCNC: 0.7 MG/DL (ref 0.5–1.4)
DIFFERENTIAL METHOD: ABNORMAL
EOSINOPHIL # BLD AUTO: 0.1 K/UL (ref 0–0.5)
EOSINOPHIL NFR BLD: 0.3 % (ref 0–8)
ERYTHROCYTE [DISTWIDTH] IN BLOOD BY AUTOMATED COUNT: 12 % (ref 11.5–14.5)
EST. GFR  (AFRICAN AMERICAN): >60 ML/MIN/1.73 M^2
EST. GFR  (NON AFRICAN AMERICAN): >60 ML/MIN/1.73 M^2
GLUCOSE SERPL-MCNC: 99 MG/DL (ref 70–110)
HCT VFR BLD AUTO: 33.9 % (ref 37–48.5)
HGB BLD-MCNC: 11.2 G/DL (ref 12–16)
IMM GRANULOCYTES # BLD AUTO: 0.21 K/UL (ref 0–0.04)
IMM GRANULOCYTES NFR BLD AUTO: 1.4 % (ref 0–0.5)
LYMPHOCYTES # BLD AUTO: 2.6 K/UL (ref 1–4.8)
LYMPHOCYTES NFR BLD: 16.9 % (ref 18–48)
MCH RBC QN AUTO: 33.3 PG (ref 27–31)
MCHC RBC AUTO-ENTMCNC: 33 G/DL (ref 32–36)
MCV RBC AUTO: 101 FL (ref 82–98)
MONOCYTES # BLD AUTO: 1.3 K/UL (ref 0.3–1)
MONOCYTES NFR BLD: 8.2 % (ref 4–15)
NEUTROPHILS # BLD AUTO: 11.3 K/UL (ref 1.8–7.7)
NEUTROPHILS NFR BLD: 72.9 % (ref 38–73)
NRBC BLD-RTO: 0 /100 WBC
PLATELET # BLD AUTO: 384 K/UL (ref 150–350)
PMV BLD AUTO: 8.9 FL (ref 9.2–12.9)
POTASSIUM SERPL-SCNC: 3.3 MMOL/L (ref 3.5–5.1)
PROT SERPL-MCNC: 5.7 G/DL (ref 6–8.4)
RBC # BLD AUTO: 3.36 M/UL (ref 4–5.4)
SODIUM SERPL-SCNC: 136 MMOL/L (ref 136–145)
WBC # BLD AUTO: 15.45 K/UL (ref 3.9–12.7)

## 2019-09-26 PROCEDURE — 85025 COMPLETE CBC W/AUTO DIFF WBC: CPT

## 2019-09-26 PROCEDURE — 25000003 PHARM REV CODE 250: Performed by: HOSPITALIST

## 2019-09-26 PROCEDURE — 96376 TX/PRO/DX INJ SAME DRUG ADON: CPT

## 2019-09-26 PROCEDURE — 96365 THER/PROPH/DIAG IV INF INIT: CPT

## 2019-09-26 PROCEDURE — 96366 THER/PROPH/DIAG IV INF ADDON: CPT

## 2019-09-26 PROCEDURE — G0378 HOSPITAL OBSERVATION PER HR: HCPCS

## 2019-09-26 PROCEDURE — 63600175 PHARM REV CODE 636 W HCPCS: Performed by: HOSPITALIST

## 2019-09-26 PROCEDURE — 80053 COMPREHEN METABOLIC PANEL: CPT

## 2019-09-26 PROCEDURE — S0030 INJECTION, METRONIDAZOLE: HCPCS | Performed by: HOSPITALIST

## 2019-09-26 PROCEDURE — 63600175 PHARM REV CODE 636 W HCPCS: Performed by: INTERNAL MEDICINE

## 2019-09-26 PROCEDURE — 96367 TX/PROPH/DG ADDL SEQ IV INF: CPT

## 2019-09-26 PROCEDURE — 25000003 PHARM REV CODE 250: Performed by: INTERNAL MEDICINE

## 2019-09-26 PROCEDURE — 36415 COLL VENOUS BLD VENIPUNCTURE: CPT

## 2019-09-26 RX ORDER — LEVOFLOXACIN 500 MG/1
500 TABLET, FILM COATED ORAL DAILY
Qty: 10 TABLET | Refills: 0 | Status: SHIPPED | OUTPATIENT
Start: 2019-09-26 | End: 2023-02-16

## 2019-09-26 RX ORDER — QUETIAPINE FUMARATE 50 MG/1
50 TABLET, FILM COATED ORAL 3 TIMES DAILY
Qty: 90 TABLET | Refills: 11
Start: 2019-09-26 | End: 2020-09-25

## 2019-09-26 RX ORDER — HYDROCODONE BITARTRATE AND ACETAMINOPHEN 10; 325 MG/1; MG/1
1 TABLET ORAL EVERY 6 HOURS PRN
Qty: 30 TABLET | Refills: 0 | Status: SHIPPED | OUTPATIENT
Start: 2019-09-26 | End: 2022-08-10

## 2019-09-26 RX ORDER — METRONIDAZOLE 500 MG/1
500 TABLET ORAL EVERY 8 HOURS
Qty: 30 TABLET | Refills: 0 | Status: SHIPPED | OUTPATIENT
Start: 2019-09-26 | End: 2023-02-16

## 2019-09-26 RX ORDER — PROMETHAZINE HYDROCHLORIDE 25 MG/1
25 TABLET ORAL EVERY 6 HOURS PRN
Qty: 30 TABLET | Refills: 1 | Status: SHIPPED | OUTPATIENT
Start: 2019-09-26 | End: 2023-02-16

## 2019-09-26 RX ORDER — ACETAMINOPHEN 10 MG/ML
1000 INJECTION, SOLUTION INTRAVENOUS ONCE
Status: COMPLETED | OUTPATIENT
Start: 2019-09-26 | End: 2019-09-26

## 2019-09-26 RX ORDER — BUPROPION HYDROCHLORIDE 150 MG/1
150 TABLET ORAL DAILY
Qty: 30 TABLET | Refills: 11
Start: 2019-09-27 | End: 2020-09-26

## 2019-09-26 RX ADMIN — ONDANSETRON 4 MG: 2 INJECTION INTRAMUSCULAR; INTRAVENOUS at 07:09

## 2019-09-26 RX ADMIN — HYDROCODONE BITARTRATE AND ACETAMINOPHEN 1 TABLET: 10; 325 TABLET ORAL at 12:09

## 2019-09-26 RX ADMIN — BUPROPION HYDROCHLORIDE 150 MG: 150 TABLET, FILM COATED, EXTENDED RELEASE ORAL at 09:09

## 2019-09-26 RX ADMIN — LEVOFLOXACIN 750 MG: 750 INJECTION, SOLUTION INTRAVENOUS at 12:09

## 2019-09-26 RX ADMIN — HYDROCODONE BITARTRATE AND ACETAMINOPHEN 1 TABLET: 10; 325 TABLET ORAL at 07:09

## 2019-09-26 RX ADMIN — ACETAMINOPHEN 1000 MG: 10 INJECTION, SOLUTION INTRAVENOUS at 09:09

## 2019-09-26 RX ADMIN — QUETIAPINE 50 MG: 25 TABLET ORAL at 09:09

## 2019-09-26 RX ADMIN — METRONIDAZOLE 500 MG: 500 SOLUTION INTRAVENOUS at 07:09

## 2019-09-26 NOTE — NURSING
PT noted with blood in urine. Pain 10/10, crying and very emotional. I have paged Dr Fraire. PT request IV pain medication to be given or she is leaving to go to the ER. PT ambulating down the martínez, VS stable. Significant other in room with PT attempting to sooth.

## 2019-09-26 NOTE — PLAN OF CARE
09/26/19 1051   Discharge Assessment   Assessment Type Discharge Planning Assessment   Confirmed/corrected address and phone number on facesheet? Yes   Assessment information obtained from? Patient   Communicated expected length of stay with patient/caregiver yes   Prior to hospitilization cognitive status: Alert/Oriented   Prior to hospitalization functional status: Independent   Current cognitive status: Alert/Oriented   Current Functional Status: Independent   Lives With significant other   Able to Return to Prior Arrangements yes   Is patient able to care for self after discharge? Yes   Patient's perception of discharge disposition home or selfcare   Patient currently being followed by outpatient case management? No   Patient currently receives any other outside agency services? No   Equipment Currently Used at Home none   Do you have any problems affording any of your prescribed medications? No   Is the patient taking medications as prescribed? yes   Does the patient have transportation home? Yes   Transportation Anticipated car, drives self   Discharge Plan A Home   Patient/Family in Agreement with Plan yes   Made appointment for [employer] [facility] for Tuesday October 1, 2019 at 1:00pm. Gave the information to the Patient.

## 2019-09-26 NOTE — NURSING
Spoke with Dr. Fraire regarding patients complaints of continued pain despite PO pain medication given per MAR at 1840. PT has increased anxiety regarding IV pain medication being removed from orders today. No new orders at this time. Dr Fraire feels that current pain medication is adequate coverage at this time. Will inform PT and continue to monitor.

## 2019-09-26 NOTE — NURSING
Patient with continued complaints of pain stating I am unable to tolerate oral medication, I am throwing it up, patient requesting IV pain medication or threatening to go to the ER. Spoke with Dr Fraire states he will not order  IV pain medication at this time.

## 2019-09-26 NOTE — NURSING
"Left midline removed without complications. No bleeding. Pressure dressing applied.    Discharge instructions, scripts and copy of her Ultrasound results given to patient. Patient agreeable to dc plan and states her "ride" is coming at noon.  "

## 2019-09-26 NOTE — NURSING
Discussed patient status/concerns with Dr Obsorn. Reviewed orders.  Removed Contact Isolation due to no BM within 24 hours.

## 2019-09-27 NOTE — DISCHARGE SUMMARY
[facility]  Discharge Summary      Patient Name: Natividad Acosta  MRN: [id number]  Admission Date: 9/24/2019  Hospital Length of Stay: 0 days  Discharge Date and Time: 9/26/2019 12:45 PM  Attending Physician: La Nena att. providers found   Discharging Provider: Ema Osborn MD  Primary Care Provider: Primary Doctor La Nena      HPI:   44-year-old lady with past medical history of anxiety/depression disorder presented with chief complaint of abdominal pain. Upon further asking patient mentioned that she was in her usual state of health until about 1 week ago when she started experiencing generalized abdominal pain and back pain and she visited the ED.  At that time workup did not reveal any organic etiology and she was sent home however over the week her symptoms progressively got worse to a point when she had multiple episodes of emesis and she was not able to tolerate anything p.o. her pain is more generalized however slightly more pronounced in left quadrants.  Patient appears very anxious and tremulous.  Patient and her  give very conflicting stories about her past medical history as well as medication use.   also mentioned that she smokes THC and CBD products.  She takes Wellbutrin, Seroquel frequently runs out of medications.   says she had a history of seizure all her life however she is not on any antiepileptics?    Past medical history:  As mentioned above, questionable seizure disorder  Family history:  Reviewed and noncontributory  Social history:  More than 20 pack year smoking history, currently smokes 1 pack every day, denies any alcohol use, admits smoking CBD and THC  Surgical history:  C-sections    * No surgery found *      Hospital Course:   Patient admitted with abdominal pain, N/V and loose stool.  Imaging consistent with colitis. IV abx started.  She has been tolerating po medication and liquids though still having some nausesa and pain.   Changed pain medication from IV to po to ensure she is going to tolerate.  Patient reportedly had a bad night via history obtained from RN as well as patient herself.  She requested IV pain medication but this was declined by the Night MD who was following my wishes of continuing with po pain medication as patient was not going to be able to go home on IV pain medication and other clinical signs pointed to improvement in symptoms.  Speaking to the patient, she reports her abdominal pain, which had originally brought her in, had greatly improved and now she was having more severe flank pain.  Review of old records shows multiple ED and hospital visits for flank pain and hematuria with concerns for passing a stone but CT imaging has revealed no ureteral stones.  She also gave history of ureteral stricture.  Patient reports she was vomiting overnight as well as starting to urinate blood and she felt strongly she was passing a kidney stone.  I repeated labs and her CT scan which were both unrevealing of new or worsening condition.  Repeat CT abd showed known right kidney stone and no evidence of ureteral stones.  There was no hydronephrosis to suggest obstruction from stricture. She has been afebrile and BCx NGTD.  CT did indicate colitis but no worsening from admission CT.  She has not had any more loose stool since admission to provide a stool sample. Upon my re evaluation of patient, I found her up in her room, dressed, brushing her hair and appearing more comfortable than this morning.  I went over results of labs and repeat CT scans.  I believe she is having some degree of pain from colitis but I also believe this is improving.  She may have a second process which seems to be this constellation of symptoms of flank pain, hematuria.  UA on admission with no blood in her urine.  Her previous workups have been unrevealing.  I discussed that I did not have an answer at this tie either based on this workup.  I did have  case management arrange for an access health appointment and discussed with her calling medicaid to see what Urologist takes her insurance.  I did discuss that just because our inpatient workup had ended, this was not the end of the road, but she would need to get into outpatient clinics to take the next step.  Abd is soft and benign on exam.  No guarding or rebound. Vitals are reassuring.  I believe patient is safe for discharge home to complete outpatient po abx.  Rx for pain medication and antiemetic also provided.  She reports vomiting overnight but appears comfortable at time of discharge and tolerating po.  Return precautions given.     Consults:   Consults (From admission, onward)        Status Ordering Provider     Inpatient consult to Social Work/Case Management  Once     Provider:  (Not yet assigned)    Completed ADRIAN HERNANDEZ          No new Assessment & Plan notes have been filed under this hospital service since the last note was generated.  Service: Hospital Medicine    Final Active Diagnoses:    Diagnosis Date Noted POA    PRINCIPAL PROBLEM:  Colitis [K52.9] 09/24/2019 Yes    Hypokalemia [E87.6] 09/25/2019 Yes    Anxiety disorder [F41.9] 09/24/2019 Yes    Tobacco dependence due to cigarettes [F17.210] 09/24/2019 Yes      Problems Resolved During this Admission:       Discharged Condition: good    Disposition: Home or Self Care    Follow Up:  Follow-up Information     Access [facility].    Why:  Please keep scheduled appointment               Patient Instructions:      Diet Adult Regular     Notify your health care provider if you experience any of the following:  temperature >100.4     Notify your health care provider if you experience any of the following:  persistent nausea and vomiting or diarrhea     Notify your health care provider if you experience any of the following:  severe uncontrolled pain     Notify your health care provider if you experience any of the following:  difficulty breathing  or increased cough     Activity as tolerated       Significant Diagnostic Studies: Labs:   CMP   Recent Labs   Lab 09/25/19  0802 09/26/19  0831    136   K 3.9 3.3*    99   CO2 28 29   * 99   BUN 10 11   CREATININE 0.5 0.7   CALCIUM 8.5* 7.7*   PROT 6.4 5.7*   ALBUMIN 2.5* 2.4*   BILITOT 0.5 0.5   ALKPHOS 85 61   AST 9* 10   ALT 10 10   ANIONGAP 8 8   ESTGFRAFRICA >60.0 >60.0   EGFRNONAA >60.0 >60.0    and CBC   Recent Labs   Lab 09/25/19  0802 09/26/19  0831   WBC 14.66* 15.45*   HGB 12.1 11.2*   HCT 37.7 33.9*    384*       Pending Diagnostic Studies:     None         Medications:  Reconciled Home Medications:      Medication List      START taking these medications    HYDROcodone-acetaminophen  mg per tablet  Commonly known as:  NORCO  Take 1 tablet by mouth every 6 (six) hours as needed.     levoFLOXacin 500 MG tablet  Commonly known as:  LEVAQUIN  Take 1 tablet (500 mg total) by mouth once daily.     metroNIDAZOLE 500 MG tablet  Commonly known as:  FLAGYL  Take 1 tablet (500 mg total) by mouth every 8 (eight) hours.     promethazine 25 MG tablet  Commonly known as:  PHENERGAN  Take 1 tablet (25 mg total) by mouth every 6 (six) hours as needed for Nausea.        CHANGE how you take these medications    buPROPion 150 MG TB24 tablet  Commonly known as:  WELLBUTRIN XL  Take 1 tablet (150 mg total) by mouth once daily.  Start taking on:  September 27, 2019  What changed:  when to take this     QUEtiapine 50 MG tablet  Commonly known as:  SEROQUEL  Take 1 tablet (50 mg total) by mouth 3 (three) times daily.  What changed:    · medication strength  · how much to take        CONTINUE taking these medications    UNKNOWN TO PATIENT  Inject as directed every 30 days.     VOLTAREN 1 % Gel  Generic drug:  diclofenac sodium  Apply 2 g topically once as needed.        STOP taking these medications    amoxicillin-clavulanate 875-125mg 875-125 mg per tablet  Commonly known as:  AUGMENTIN      CONJUGATED ESTROGENS INJ            Indwelling Lines/Drains at time of discharge:   Lines/Drains/Airways     None                 Time spent on the discharge of patient: 36 minutes  Patient was seen and examined on the date of discharge and determined to be suitable for discharge.         Ema Osborn MD  Department of Hospital Medicine  [facility]

## 2019-09-30 LAB
BACTERIA BLD CULT: NORMAL
BACTERIA BLD CULT: NORMAL

## 2019-10-02 LAB
BARBITURATES UR QL SCN: NEGATIVE NG/ML
LABORATORY COMMENT REPORT: NORMAL

## 2020-07-27 NOTE — TELEPHONE ENCOUNTER
I spoke with patient, she is having to take 2 Norco at a time to help with pain.  She cannot take Ibuprofen due to stomach problems.  Advised to apply an ice pack to also help with pain.  Call when she is close to running out of pain medication and we can send in a refill.   As per HPI

## 2021-01-25 PROBLEM — F51.05 INSOMNIA DUE TO OTHER MENTAL DISORDER: Status: ACTIVE | Noted: 2021-01-25

## 2021-01-25 PROBLEM — F99 INSOMNIA DUE TO OTHER MENTAL DISORDER: Status: ACTIVE | Noted: 2021-01-25

## 2021-01-25 PROBLEM — M79.7 FIBROMYALGIA: Status: ACTIVE | Noted: 2021-01-25

## 2022-02-13 NOTE — PATIENT INSTRUCTIONS
Rotator Cuff Tear  The rotator cuff is a group of muscles and tendons that surround the shoulder joint. These muscles and tendons hold the arm in its joint. They also help the shoulder to rotate. The rotator cuff can be torn from overuse or injury. Gradual wear and tear can lead to inflammation of these tendons. This can progress to gradual or sudden tears.  Symptoms of a torn rotator cuff include:  · Shoulder pain that gets worse when you raise your arm overhead  · Weakness of the shoulder muscles with overhead activity  · Popping and clicking when you move your shoulder  · Shoulder pain that wakes you up at night when sleeping on the hurt shoulder  Diagnosis is made by an MRI or arthroscopy. This is a surgical procedure to look inside the joint through a small tube. Partial rotator cuff tears can be treated by first resting, then strengthening the rotator cuff muscles.  Anti-inflammatory medicines, such as ibuprofen or naproxen, are useful. A limited number of steroid injections can be given. Surgery may be recommended for complete tears and partial tears that do not respond to medical treatment.  Home care  · Avoid activities that make your pain worse. This includes overhead activities, doing the same motion over and over, and heavy lifting.  · You may use over-the-counter pain medicines to control pain, unless another medicine was prescribed. If you have chronic liver or kidney disease or ever had a stomach ulcer or GI bleeding, talk with your healthcare provider before using these medicines.  · If you were given a sling, use it for comfort. After your pain decreases, dont keep your arm in the sling all the time. Take your arm out several times a day and move the shoulder joint, as you are able.  · Your healthcare provider may recommend gentle pendulum exercises. Stand or sit with your arm vertical and close to your side. Relax your shoulder muscles and gently swing the arm forward and back, side to side, and  in small circles for about 5 minutes. Do this once or twice a day. There should be only slight pain with this exercise.  · You may benefit from physical therapy or a home exercise program to strengthen your shoulder muscles. This will also increase your pain-free range of motion. Applying heat prior to exercises can help prepare the muscles and joint for activity. Talk to your healthcare provider about what is best for your condition.  Follow-up care  Follow up with your healthcare provider, or as advised.  When to seek medical advice  Call your healthcare provider right away if any of the following occur:  · Increasing shoulder pain  · Rapid swelling in the involved shoulder or arm  · Numbness, tingling, or pain radiating down the arm to the hand  · Loss of strength in the affected arm  Date Last Reviewed: 11/23/2015  © 5013-4130 [employer]. [address] [place] [address], [place] [address]. All rights reserved. This information is not intended as a substitute for professional medical care. Always follow your healthcare professional's instructions.         No

## 2022-03-29 PROBLEM — N23 RENAL COLIC ON LEFT SIDE: Status: ACTIVE | Noted: 2022-03-29

## 2022-05-24 ENCOUNTER — TELEPHONE (OUTPATIENT)
Dept: PAIN MEDICINE | Facility: CLINIC | Age: 47
End: 2022-05-24

## 2022-08-10 ENCOUNTER — OFFICE VISIT (OUTPATIENT)
Dept: ORTHOPEDICS | Facility: CLINIC | Age: 47
End: 2022-08-10
Payer: MEDICAID

## 2022-08-10 VITALS — WEIGHT: 126 LBS | HEIGHT: 61 IN | BODY MASS INDEX: 23.79 KG/M2

## 2022-08-10 DIAGNOSIS — S93.412A SPRAIN OF CALCANEOFIBULAR LIGAMENT OF LEFT ANKLE, INITIAL ENCOUNTER: ICD-10-CM

## 2022-08-10 DIAGNOSIS — S93.492A SPRAIN OF ANTERIOR TALOFIBULAR LIGAMENT OF LEFT ANKLE, INITIAL ENCOUNTER: Primary | ICD-10-CM

## 2022-08-10 PROCEDURE — 3008F BODY MASS INDEX DOCD: CPT | Mod: CPTII,S$GLB,, | Performed by: PHYSICIAN ASSISTANT

## 2022-08-10 PROCEDURE — 1160F PR REVIEW ALL MEDS BY PRESCRIBER/CLIN PHARMACIST DOCUMENTED: ICD-10-PCS | Mod: CPTII,S$GLB,, | Performed by: PHYSICIAN ASSISTANT

## 2022-08-10 PROCEDURE — 99203 PR OFFICE/OUTPT VISIT, NEW, LEVL III, 30-44 MIN: ICD-10-PCS | Mod: S$GLB,,, | Performed by: PHYSICIAN ASSISTANT

## 2022-08-10 PROCEDURE — 3008F PR BODY MASS INDEX (BMI) DOCUMENTED: ICD-10-PCS | Mod: CPTII,S$GLB,, | Performed by: PHYSICIAN ASSISTANT

## 2022-08-10 PROCEDURE — 1159F PR MEDICATION LIST DOCUMENTED IN MEDICAL RECORD: ICD-10-PCS | Mod: CPTII,S$GLB,, | Performed by: PHYSICIAN ASSISTANT

## 2022-08-10 PROCEDURE — 1159F MED LIST DOCD IN RCRD: CPT | Mod: CPTII,S$GLB,, | Performed by: PHYSICIAN ASSISTANT

## 2022-08-10 PROCEDURE — 99203 OFFICE O/P NEW LOW 30 MIN: CPT | Mod: S$GLB,,, | Performed by: PHYSICIAN ASSISTANT

## 2022-08-10 PROCEDURE — 1160F RVW MEDS BY RX/DR IN RCRD: CPT | Mod: CPTII,S$GLB,, | Performed by: PHYSICIAN ASSISTANT

## 2022-08-10 RX ORDER — TRAMADOL HYDROCHLORIDE 50 MG/1
50 TABLET ORAL EVERY 8 HOURS PRN
Qty: 21 TABLET | Refills: 0 | Status: SHIPPED | OUTPATIENT
Start: 2022-08-10 | End: 2023-02-16

## 2022-08-10 NOTE — PROGRESS NOTES
[facility]  [address]  [place] [address]  Phone: [phone]   [phone]    Patient's PCP: Primary Doctor No  Referring Provider: Dr. Adryan Jansen    Subjective:      Chief Complaint:   Chief Complaint   Patient presents with    Left Foot - Injury     States that about 3 weeks ago, she was getting up from bed and her foot was asleep. When she stepped down, the ankle rolled and she heard a pop. She subsequently slipped on the stairs a week ago and further injured it.       Past Medical History:   Diagnosis Date    Depression     Kidney stone     Kidney stone     Kidney stone     Mitral valve prolapse     MS (multiple sclerosis)     Renal disorder kidney stones    Seizures        Past Surgical History:   Procedure Laterality Date    APPENDECTOMY       SECTION      CHOLECYSTECTOMY      HERNIA REPAIR Left 2017    HYSTERECTOMY      kidney stent      SHOULDER ARTHROSCOPY Right     TONSILLECTOMY      URETER SURGERY         Current Outpatient Medications   Medication Sig    amitriptyline (ELAVIL) 50 MG tablet Take 1 tablet (50 mg total) by mouth every evening.    cloNIDine (CATAPRES) 0.3 MG tablet Take 1 tablet (0.3 mg total) by mouth once daily.    dextroamphetamine-amphetamine 30 mg Tab Take 1 tablet (30 mg total) by mouth 2 (two) times daily.    EScitalopram oxalate (LEXAPRO) 10 MG tablet Take 1 tablet (10 mg total) by mouth once daily.    estradioL (ESTRACE) 2 MG tablet Take 1 tablet (2 mg total) by mouth once daily.    ESTRADIOL CYPIONATE IM Inject into the muscle every 30 days.    buPROPion (WELLBUTRIN SR) 150 MG TBSR 12 hr tablet TAKE ONE TABLET BY MOUTH TWICE DAILY (Patient not taking: Reported on 8/10/2022)    cariprazine (VRAYLAR) 3 mg Cap Take 1 capsule (3 mg total) by mouth Daily. (Patient not taking: Reported on 8/10/2022)    diclofenac sodium (VOLTAREN) 1 % Gel Apply 2 g topically once as needed.    HYDROcodone-acetaminophen  (NORCO)  mg per tablet Take 1 tablet by mouth every 6 (six) hours as needed. (Patient not taking: No sig reported)    levoFLOXacin (LEVAQUIN) 500 MG tablet Take 1 tablet (500 mg total) by mouth once daily. (Patient not taking: No sig reported)    metroNIDAZOLE (FLAGYL) 500 MG tablet Take 1 tablet (500 mg total) by mouth every 8 (eight) hours. (Patient not taking: No sig reported)    OXcarbazepine (TRILEPTAL) 300 MG Tab Take 0.5 tablets (150 mg total) by mouth 2 (two) times daily. (Patient not taking: Reported on 8/10/2022)    oxyCODONE-acetaminophen (PERCOCET) 2.5-325 mg per tablet Take 1 tablet by mouth every 6 (six) hours as needed for Pain. (Patient not taking: No sig reported)    oxyCODONE-acetaminophen (PERCOCET) 5-325 mg per tablet Take 1 tablet by mouth every 6 (six) hours as needed for Pain. (Patient not taking: No sig reported)    perphenazine (TRILAFON) 4 MG tablet Take 1 tablet (4 mg total) by mouth after dinner. (Patient not taking: No sig reported)    progesterone (PROMETRIUM) 100 MG capsule Take 1 capsule (100 mg total) by mouth once daily. (Patient not taking: No sig reported)    promethazine (PHENERGAN) 25 MG tablet Take 1 tablet (25 mg total) by mouth every 6 (six) hours as needed for Nausea. (Patient not taking: Reported on 8/10/2022)    promethazine (PHENERGAN) 25 MG tablet Take 1 tablet (25 mg total) by mouth every 6 (six) hours as needed for Nausea. Twice per day and prn vomiting (Patient not taking: Reported on 8/10/2022)    propranoloL (INDERAL) 40 MG tablet Take 2 tablets (80 mg total) by mouth 2 (two) times daily. (Patient not taking: Reported on 8/10/2022)    QUEtiapine (SEROQUEL) 200 MG Tab Take 1 tablet (200 mg total) by mouth once daily. (Patient not taking: Reported on 8/10/2022)    stanozolol micronized, bulk, 100 % Powd 2 mg by Misc.(Non-Drug; Combo Route) route every Mon, Wed, Fri. Sublingual (if you can compound). If not, notify patient (Patient not taking: No sig  reported)    sulfamethoxazole-trimethoprim 800-160mg (BACTRIM DS) 800-160 mg Tab Take 1 tablet by mouth 2 (two) times daily. (Patient not taking: Reported on 8/10/2022)    UNKNOWN TO PATIENT Inject as directed every 30 days.     No current facility-administered medications for this visit.       Review of patient's allergies indicates:   Allergen Reactions    Stadol [butorphanol tartrate] Anaphylaxis    Corticosteroids (glucocorticoids)     Demerol [meperidine]      vomiting    Iodinated contrast media     Iodine and iodide containing products     Levaquin [levofloxacin] Hives    Metoclopramide hcl Hives    Morphine      vomiting      Morphine     Povidone-iodine     Reglan [metoclopramide] Hives    Soap     Stadol [butorphanol tartrate]     Toradol [ketorolac] Other (See Comments)     Told not to take due to kidneys    Zofran [ondansetron hcl (pf)]     Iodine Rash       Family History   Problem Relation Age of Onset    No Known Problems Mother     Diabetes Father        Social History     Socioeconomic History    Marital status:    Tobacco Use    Smoking status: Current Every Day Smoker     Packs/day: 0.50   Substance and Sexual Activity    Alcohol use: Yes     Comment: occ    Drug use: Yes     Types: Marijuana     Comment: about 1 week ago   Social History Narrative    ** Merged History Encounter **            Prior to meeting with the patient I reviewed the medical chart in [facility]. This included reviewing the previous progress notes from our office, review of the patient's last appointment with their primary care provider, review of any visits to the emergency room, and review of any pain management appointments or procedures.    History of present illness:  47-year-old female who presents to clinic today in follow-up from urgent care.  Unfortunately she has had a couple of injuries to this left foot that initially she got out of bed in the middle of the night and her foot was  asleep.  She had an injury she felt a pop and had some immediate pain and swelling to that foot.  She went to urgent care who instructed her to follow-up with orthopedics and she may have a foot fracture.  She has been wearing a compression sleeve, she has been using crutches to assist with ambulation.  She initially reported some swelling and bruising however this has resolved.      Review of Systems:    Constitutional: Negative for chills, fever and weight loss.   HENT: Negative for congestion.    Eyes: Negative for discharge and redness.   Respiratory: Negative for cough and shortness of breath.    Cardiovascular: Negative for chest pain.   Gastrointestinal: Negative for nausea and vomiting.   Musculoskeletal: See HPI.   Skin: Negative for rash.   Neurological: Negative for headaches.   Endo/Heme/Allergies: Does not bruise/bleed easily.   Psychiatric/Behavioral: The patient is not nervous/anxious.    All other systems reviewed and are negative.       Objective:      Physical Examination:    Vital Signs:  There were no vitals filed for this visit.    Body mass index is 23.81 kg/m².    This a well-developed, well nourished patient in no acute distress.  They are alert and oriented and cooperative to examination.     Examination of the left foot and ankle, she has no tenderness to palpation or percussion over the medial or lateral malleolus.  No significant laxity noted with drawer testing.  She does have pain with inversion and eversion of the left ankle.  Pain is localized over the lateral aspect of the midfoot.  Proximal 5th metatarsal and across the midfoot.  No significant swelling is noted, no edema.  No bruising is noted.  No crepitus.  All the toes appear normal.      Pertinent New Results:        XRAY Report / Interpretation:   AP lateral and oblique views of the left foot taken today in the office do not demonstrate any significant osseous abnormalities.          Assessment:       1. Sprain of anterior  talofibular ligament of left ankle, initial encounter    2. Sprain of calcaneofibular ligament of left ankle, initial encounter      Plan:     Sprain of anterior talofibular ligament of left ankle, initial encounter  -     X-Ray Foot Complete Left    Sprain of calcaneofibular ligament of left ankle, initial encounter  -     Ambulatory referral/consult to Orthopedics        No follow-ups on file.    47-year-old female with reported left foot fracture.  Not visualized today on multiple images of the left foot taken today in the office.  Findings more consistent with a grade 3 ankle sprain with routine healing.  Have ordered physical therapy for the ankle, placed the patient in a boot orthosis with weight-bearing as tolerated.  Gave her prescription for tramadol for pain.  Follow-up in 6 weeks.  I did place an order for an MRI if approved by insurance.    [unfilled]  JOSE Sevilla, PA-C    This note was created using [employer] voice recognition software that occasionally misinterprets words or phrases.

## 2022-11-01 PROBLEM — F11.21 OPIOID DEPENDENCE IN REMISSION: Status: ACTIVE | Noted: 2022-11-01

## 2022-12-13 NOTE — TELEPHONE ENCOUNTER
----- Message from Suzanne Looney sent at 8/17/2017  2:54 PM CDT -----  Contact: [facility]   Missed call. Wants refill on pain medication   Working all day on her feet, lots of pain   Please leave message on secure voicemail   Knows next test is scheduled  08 22 2017   Call back      Patient mother informed of need for visit. Transferred to [facility] to schedule.

## 2023-02-16 PROBLEM — F31.11 BIPOLAR 1 DISORDER, MANIC, MILD: Status: ACTIVE | Noted: 2023-02-16

## 2023-06-13 PROBLEM — G43.919 INTRACTABLE MIGRAINE: Status: ACTIVE | Noted: 2023-06-13

## (undated) DEVICE — APPLICATOR CHLORAPREP ORN 26ML

## (undated) DEVICE — ELECTRODE BLADE INSULATED 1 IN

## (undated) DEVICE — GAUZE SPONGE 8X4 12 PLY

## (undated) DEVICE — NDL SAFETY 25G X 1.5 ECLIPSE

## (undated) DEVICE — SEE MEDLINE ITEM 146417

## (undated) DEVICE — SEE MEDLINE ITEM 152622

## (undated) DEVICE — SUT MONOCRYL 3-0 SH U/D

## (undated) DEVICE — GLOVE SURG ULTRA TOUCH 8

## (undated) DEVICE — STRIP STERI REIN CLSR 1/2X2IN

## (undated) DEVICE — SUT 2-0 VICRYL / SH (J417)

## (undated) DEVICE — SYR 10CC LUER LOCK

## (undated) DEVICE — SEE MEDLINE ITEM 146292

## (undated) DEVICE — STRAP OR TABLE 5IN X 72IN

## (undated) DEVICE — SWABSTICK BENZOIN 4 IN

## (undated) DEVICE — CLOSURE SKIN STERI STRIP 1/2X4

## (undated) DEVICE — DRESSING TRANS 4X4 TEGADERM

## (undated) DEVICE — SUT J286G 2-0 VICRYL

## (undated) DEVICE — SEE MEDLINE ITEM 157116

## (undated) DEVICE — SEE MEDLINE ITEM 157148

## (undated) DEVICE — SLEEVE SCD EXPRESS CALF MEDIUM

## (undated) DEVICE — ELECTRODE REM PLYHSV RETURN 9

## (undated) DEVICE — SUT 3-0 VICRYL / SH (J416)

## (undated) DEVICE — SUT NOVA BLUE 2-0 HERNIA 30

## (undated) DEVICE — PACK CUSTOM UNIV BASIN SLI

## (undated) DEVICE — SEE MEDLINE ITEM 157117

## (undated) DEVICE — GAUZE SPONGE BULKEE 6X6.75IN

## (undated) DEVICE — UNDERGLOVES BIOGEL PI SIZE 8